# Patient Record
Sex: FEMALE | Race: WHITE | NOT HISPANIC OR LATINO | Employment: FULL TIME | URBAN - METROPOLITAN AREA
[De-identification: names, ages, dates, MRNs, and addresses within clinical notes are randomized per-mention and may not be internally consistent; named-entity substitution may affect disease eponyms.]

---

## 2023-02-07 PROBLEM — R00.9 ELEVATED HEART RATE WITH ELEVATED BLOOD PRESSURE WITHOUT DIAGNOSIS OF HYPERTENSION: Status: RESOLVED | Noted: 2023-02-07 | Resolved: 2023-02-07

## 2023-02-07 PROBLEM — M79.18 MYOFASCIAL PAIN: Status: ACTIVE | Noted: 2023-02-07

## 2023-02-07 PROBLEM — J01.00 ACUTE MAXILLARY SINUSITIS: Status: RESOLVED | Noted: 2023-02-07 | Resolved: 2023-02-07

## 2023-02-07 PROBLEM — G47.8 NON-RESTORATIVE SLEEP: Status: RESOLVED | Noted: 2023-02-07 | Resolved: 2023-02-07

## 2023-02-07 PROBLEM — R53.83 FATIGUE: Status: RESOLVED | Noted: 2023-02-07 | Resolved: 2023-02-07

## 2023-02-07 PROBLEM — N80.9 ENDOMETRIOSIS: Status: ACTIVE | Noted: 2023-02-07

## 2023-02-07 PROBLEM — R10.2 FEMALE PELVIC PAIN: Status: RESOLVED | Noted: 2023-02-07 | Resolved: 2023-02-07

## 2023-02-07 PROBLEM — E55.9 VITAMIN D DEFICIENCY: Status: ACTIVE | Noted: 2023-02-07

## 2023-02-07 PROBLEM — N93.9 ABNORMAL UTERINE BLEEDING (AUB): Status: RESOLVED | Noted: 2023-02-07 | Resolved: 2023-02-07

## 2023-02-07 PROBLEM — N97.9 INFERTILITY, FEMALE, PRIMARY: Status: ACTIVE | Noted: 2023-02-07

## 2023-02-07 PROBLEM — N94.6 DYSMENORRHEA: Status: ACTIVE | Noted: 2023-02-07

## 2023-02-07 PROBLEM — N92.0 MENORRHAGIA WITH REGULAR CYCLE: Status: RESOLVED | Noted: 2023-02-07 | Resolved: 2023-02-07

## 2023-02-07 PROBLEM — F32.0 DEPRESSION, MAJOR, SINGLE EPISODE, MILD (CMS-HCC): Status: ACTIVE | Noted: 2023-02-07

## 2023-02-07 PROBLEM — R10.9 FLANK PAIN: Status: RESOLVED | Noted: 2023-02-07 | Resolved: 2023-02-07

## 2023-02-07 PROBLEM — F41.9 ANXIETY: Status: ACTIVE | Noted: 2023-02-07

## 2023-02-07 PROBLEM — K21.9 GERD (GASTROESOPHAGEAL REFLUX DISEASE): Status: ACTIVE | Noted: 2023-02-07

## 2023-02-07 PROBLEM — R11.0 NAUSEA IN ADULT: Status: RESOLVED | Noted: 2023-02-07 | Resolved: 2023-02-07

## 2023-02-07 PROBLEM — J30.2 SEASONAL ALLERGIES: Status: ACTIVE | Noted: 2023-02-07

## 2023-02-07 PROBLEM — N94.10 DYSPAREUNIA IN FEMALE: Status: ACTIVE | Noted: 2023-02-07

## 2023-02-07 PROBLEM — R03.0 ELEVATED HEART RATE WITH ELEVATED BLOOD PRESSURE WITHOUT DIAGNOSIS OF HYPERTENSION: Status: RESOLVED | Noted: 2023-02-07 | Resolved: 2023-02-07

## 2023-02-07 PROBLEM — E61.1 LOW IRON: Status: ACTIVE | Noted: 2023-02-07

## 2023-02-07 PROBLEM — R00.2 PALPITATIONS: Status: RESOLVED | Noted: 2023-02-07 | Resolved: 2023-02-07

## 2023-02-07 PROBLEM — E11.9 NEW ONSET TYPE 2 DIABETES MELLITUS (MULTI): Status: ACTIVE | Noted: 2023-02-07

## 2023-02-07 PROBLEM — L30.9 ECZEMA: Status: ACTIVE | Noted: 2023-02-07

## 2023-02-07 PROBLEM — R94.31 ABNORMAL EKG: Status: RESOLVED | Noted: 2023-02-07 | Resolved: 2023-02-07

## 2023-02-07 PROBLEM — R29.818 SUSPECTED SLEEP APNEA: Status: ACTIVE | Noted: 2023-02-07

## 2023-02-07 PROBLEM — R51.9 HEADACHE: Status: ACTIVE | Noted: 2023-02-07

## 2023-02-07 RX ORDER — METOPROLOL SUCCINATE 25 MG/1
1 TABLET, EXTENDED RELEASE ORAL DAILY
COMMUNITY
Start: 2020-06-11 | End: 2023-06-12

## 2023-02-07 RX ORDER — ERGOCALCIFEROL 1.25 MG/1
1 CAPSULE ORAL
COMMUNITY
Start: 2016-08-16 | End: 2023-03-23 | Stop reason: ALTCHOICE

## 2023-02-07 RX ORDER — MINERAL OIL
1 ENEMA (ML) RECTAL DAILY PRN
COMMUNITY
End: 2023-04-24 | Stop reason: SDUPTHER

## 2023-02-07 RX ORDER — TRAMADOL HYDROCHLORIDE 50 MG/1
50 TABLET ORAL 4 TIMES DAILY PRN
COMMUNITY
Start: 2020-02-13 | End: 2023-06-15 | Stop reason: ALTCHOICE

## 2023-02-07 RX ORDER — DULOXETIN HYDROCHLORIDE 60 MG/1
1 CAPSULE, DELAYED RELEASE ORAL DAILY
COMMUNITY
Start: 2021-04-30 | End: 2024-01-02 | Stop reason: SDUPTHER

## 2023-02-07 RX ORDER — TRANEXAMIC ACID 650 MG/1
TABLET ORAL
COMMUNITY
Start: 2020-11-11 | End: 2023-03-23 | Stop reason: ALTCHOICE

## 2023-02-07 RX ORDER — FERROUS SULFATE 325(65) MG
1 TABLET ORAL DAILY
COMMUNITY
Start: 2022-10-19 | End: 2023-03-23 | Stop reason: ALTCHOICE

## 2023-02-07 RX ORDER — OMEPRAZOLE 20 MG/1
1 CAPSULE, DELAYED RELEASE ORAL DAILY
COMMUNITY
Start: 2022-06-24 | End: 2023-10-13 | Stop reason: SDUPTHER

## 2023-02-07 RX ORDER — DIAZEPAM 5 MG/1
TABLET ORAL EVERY 8 HOURS PRN
COMMUNITY
Start: 2019-05-06

## 2023-02-07 RX ORDER — MONTELUKAST SODIUM 10 MG/1
1 TABLET ORAL DAILY
COMMUNITY
Start: 2020-11-11 | End: 2023-06-12

## 2023-02-07 RX ORDER — METFORMIN HYDROCHLORIDE 500 MG/1
TABLET ORAL 2 TIMES DAILY
COMMUNITY
Start: 2022-03-25 | End: 2023-03-23 | Stop reason: SDUPTHER

## 2023-02-07 RX ORDER — KETOROLAC TROMETHAMINE 10 MG/1
1 TABLET, FILM COATED ORAL EVERY 8 HOURS PRN
COMMUNITY
Start: 2019-03-03 | End: 2023-10-06 | Stop reason: WASHOUT

## 2023-02-07 RX ORDER — IBUPROFEN 200 MG
CAPSULE ORAL
COMMUNITY
Start: 2022-03-25

## 2023-03-23 ENCOUNTER — TELEMEDICINE (OUTPATIENT)
Dept: PHARMACY | Facility: HOSPITAL | Age: 43
End: 2023-03-23
Payer: COMMERCIAL

## 2023-03-23 DIAGNOSIS — E11.9 TYPE 2 DIABETES MELLITUS WITHOUT COMPLICATION, WITHOUT LONG-TERM CURRENT USE OF INSULIN (MULTI): Primary | ICD-10-CM

## 2023-03-23 RX ORDER — BLOOD-GLUCOSE METER
EACH MISCELLANEOUS
COMMUNITY
Start: 2022-03-25

## 2023-03-23 RX ORDER — METFORMIN HYDROCHLORIDE 500 MG/1
1000 TABLET ORAL
COMMUNITY
End: 2023-11-29 | Stop reason: SDUPTHER

## 2023-03-23 RX ORDER — OMEPRAZOLE 20 MG/1
20 TABLET, DELAYED RELEASE ORAL
COMMUNITY
End: 2023-03-23 | Stop reason: SDUPTHER

## 2023-03-23 NOTE — PROGRESS NOTES
Caprice Cowart is a 43 y.o. female was referred to Clinical Pharmacy Team to complete a comprehensive medication review (CMR) with a pharmacist as part of the [employer] diabetes program.  The patient was referred for their Diabetes.    Referring Provider: Jacquelyn De Paz MD    Subjective   Allergies   Allergen Reactions    Penicillins Hives    Promethazine Other     Vomiting    Sumatriptan-Naproxen Other     Angina, numbness with tingling    Neomycin-Bacitracin-Polymyxin Rash       [facility] - [place], [place] - [address]  [address]  [facility] [id number]  Phone: [phone] Fax: [phone]      Diabetes  She presents for her follow-up diabetic visit. She has type 2 diabetes mellitus.       Objective     There were no vitals taken for this visit.     LAB  Lab Results   Component Value Date    BILITOT 0.3 02/18/2022    CALCIUM 8.6 08/06/2022    CO2 25 08/06/2022     08/06/2022    CREATININE 0.67 08/06/2022    GLUCOSE 136 (H) 08/06/2022    ALKPHOS 116 (H) 02/18/2022    K 4.0 08/06/2022    PROT 7.4 02/18/2022     08/06/2022    AST 16 02/18/2022    ALT 19 02/18/2022    BUN 10 08/06/2022    ANIONGAP 14 08/06/2022    ALBUMIN 3.9 02/18/2022    GFRF >90 08/06/2022     Lab Results   Component Value Date    TRIG 161 (H) 02/18/2022    CHOL 174 02/18/2022    HDL 79.0 02/18/2022     Lab Results   Component Value Date    HGBA1C 8.3 (A) 02/18/2022       Current Outpatient Medications on File Prior to Visit   Medication Sig Dispense Refill    Accu-Chek Guide Me Glucose Mtr misc Use to test blood sugars as directed      blood sugar diagnostic (Blood Glucose Test) strip TEST twice a day      diazePAM (Valium) 5 mg tablet Take by mouth every 8 hours if needed.      DULoxetine (Cymbalta) 60 mg DR capsule Take 1 capsule (60 mg) by mouth in the morning.      fexofenadine (Allegra) 180 mg tablet Take 1 tablet (180 mg) by mouth if needed each day.      ketorolac  (Toradol) 10 mg tablet Take 1 tablet (10 mg) by mouth every 8 (eight) hours if needed.      metFORMIN (Glucophage) 500 mg tablet Take 2 tablets (1,000 mg) by mouth in the morning and 2 tablets (1,000 mg) in the evening. Take with meals.      metoprolol succinate XL (Toprol-XL) 25 mg 24 hr tablet Take 1 tablet (25 mg) by mouth in the morning.      montelukast (Singulair) 10 mg tablet Take 1 tablet (10 mg) by mouth in the morning.      omeprazole (PriLOSEC) 20 mg DR capsule Take 1 capsule (20 mg) by mouth in the morning.      traMADol (Ultram) 50 mg tablet Take 1 tablet (50 mg) by mouth 4 times a day as needed.      [DISCONTINUED] ergocalciferol (Vitamin D-2) 1.25 MG (22395 UT) capsule Take 1 capsule (1,250 mcg) by mouth 1 (one) time per week.      [DISCONTINUED] ferrous sulfate 325 (65 Fe) MG tablet Take 1 tablet (325 mg) by mouth in the morning.      [DISCONTINUED] metFORMIN (Glucophage) 500 mg tablet Take by mouth twice a day.      [DISCONTINUED] omeprazole OTC (PriLOSEC OTC) 20 mg EC tablet Take 1 tablet (20 mg) by mouth once daily in the morning. Take before meals.      [DISCONTINUED] tranexamic acid (Lysteda) 650 mg tablet tablet Take by mouth.       No current facility-administered medications on file prior to visit.        HISTORICAL PHARMACOTHERAPY  -No historical pharmacotherapy    DRUG INTERACTIONS  - No major drug interactions that would require change in therapy.    SECONDARY PREVENTION  - Statin? no  - ACE-I/ARB? no    Assessment/Plan   - Patient sometimes experiences diarrhea with Metformin but states it is not impeding on daily life and wishes to stay on the medication.  - Patient tests randomly, usually not daily, but states her levels are typically ~130. States no symptomatic episodes of hyperglycemia. States she does have some symptoms similar to hypoglycemia, but believes these are due to another issue she is having.  - Patient would like increased energy, but has a lot of issues going on that are  impeding on this goal.  - Patient information updated and sent to [facility] for annual renewal of VBID enrollment.         Follow up: Annually for vbid renewal.    Yin Arrington PharmD     Verbal consent to manage patient's drug therapy was obtained from [the patient and/or an individual authorized to act on behalf of a patient]. They were informed they may decline to participate or withdraw from participation in pharmacy services at any time.

## 2023-03-30 ENCOUNTER — TELEPHONE (OUTPATIENT)
Dept: PRIMARY CARE | Facility: CLINIC | Age: 43
End: 2023-03-30
Payer: COMMERCIAL

## 2023-03-30 DIAGNOSIS — R09.81 SINUS CONGESTION: ICD-10-CM

## 2023-03-30 DIAGNOSIS — R09.81 SINUS CONGESTION: Primary | ICD-10-CM

## 2023-03-30 RX ORDER — AZITHROMYCIN 250 MG/1
TABLET, FILM COATED ORAL
Qty: 6 TABLET | Refills: 0 | Status: SHIPPED | OUTPATIENT
Start: 2023-03-30 | End: 2023-04-04

## 2023-03-30 RX ORDER — AZITHROMYCIN 250 MG/1
TABLET, FILM COATED ORAL
Qty: 6 TABLET | Refills: 0 | Status: SHIPPED | OUTPATIENT
Start: 2023-03-30 | End: 2023-03-30 | Stop reason: SDUPTHER

## 2023-03-30 RX ORDER — AZITHROMYCIN 250 MG/1
TABLET, FILM COATED ORAL
Qty: 6 TABLET | Refills: 0 | Status: CANCELLED | OUTPATIENT
Start: 2023-03-30 | End: 2023-04-04

## 2023-03-30 NOTE — TELEPHONE ENCOUNTER
Caprice is having congestion, runny nose, and post nasal drip. She has surgery on Tuesday. She wants to know if a Z-Jered can be called into [facility]. Will you send or would you like her to do a virtual apt first?

## 2023-03-30 NOTE — PROGRESS NOTES
I reviewed the progress note and agree with the resident’s findings and plans as written. Case discussed with resident.    Jackson Bennett, PharmD

## 2023-04-01 LAB
ABO GROUP (TYPE) IN BLOOD: NORMAL
ANION GAP IN SER/PLAS: 14 MMOL/L (ref 10–20)
ANTIBODY SCREEN: NORMAL
CALCIUM (MG/DL) IN SER/PLAS: 8.5 MG/DL (ref 8.6–10.3)
CARBON DIOXIDE, TOTAL (MMOL/L) IN SER/PLAS: 26 MMOL/L (ref 21–32)
CHLORIDE (MMOL/L) IN SER/PLAS: 97 MMOL/L (ref 98–107)
CHORIOGONADOTROPIN (MIU/ML) IN SER/PLAS: <2 MIU/ML
CREATININE (MG/DL) IN SER/PLAS: 0.65 MG/DL (ref 0.5–1.05)
ERYTHROCYTE DISTRIBUTION WIDTH (RATIO) BY AUTOMATED COUNT: 13.3 % (ref 11.5–14.5)
ERYTHROCYTE MEAN CORPUSCULAR HEMOGLOBIN CONCENTRATION (G/DL) BY AUTOMATED: 32.3 G/DL (ref 32–36)
ERYTHROCYTE MEAN CORPUSCULAR VOLUME (FL) BY AUTOMATED COUNT: 88 FL (ref 80–100)
ERYTHROCYTES (10*6/UL) IN BLOOD BY AUTOMATED COUNT: 4.34 X10E12/L (ref 4–5.2)
GFR FEMALE: >90 ML/MIN/1.73M2
GLUCOSE (MG/DL) IN SER/PLAS: 169 MG/DL (ref 74–99)
HEMATOCRIT (%) IN BLOOD BY AUTOMATED COUNT: 38.4 % (ref 36–46)
HEMOGLOBIN (G/DL) IN BLOOD: 12.4 G/DL (ref 12–16)
LEUKOCYTES (10*3/UL) IN BLOOD BY AUTOMATED COUNT: 8.9 X10E9/L (ref 4.4–11.3)
PLATELETS (10*3/UL) IN BLOOD AUTOMATED COUNT: 331 X10E9/L (ref 150–450)
POTASSIUM (MMOL/L) IN SER/PLAS: 3.8 MMOL/L (ref 3.5–5.3)
RH FACTOR: NORMAL
SODIUM (MMOL/L) IN SER/PLAS: 133 MMOL/L (ref 136–145)
UREA NITROGEN (MG/DL) IN SER/PLAS: 12 MG/DL (ref 6–23)

## 2023-04-02 LAB
APPEARANCE, URINE: ABNORMAL
BACTERIA, URINE: ABNORMAL /HPF
BILIRUBIN, URINE: NEGATIVE
BLOOD, URINE: NEGATIVE
BUDDING YEAST, URINE: PRESENT /HPF
COLOR, URINE: YELLOW
GLUCOSE, URINE: NEGATIVE MG/DL
KETONES, URINE: NEGATIVE MG/DL
LEUKOCYTE ESTERASE, URINE: NEGATIVE
MUCUS, URINE: ABNORMAL /LPF
NITRITE, URINE: NEGATIVE
PH, URINE: 5 (ref 5–8)
PROTEIN, URINE: ABNORMAL MG/DL
RBC, URINE: ABNORMAL /HPF (ref 0–5)
SPECIFIC GRAVITY, URINE: 1.02 (ref 1–1.03)
SQUAMOUS EPITHELIAL CELLS, URINE: 38 /HPF
UROBILINOGEN, URINE: <2 MG/DL (ref 0–1.9)
WBC, URINE: 1 /HPF (ref 0–5)

## 2023-04-03 LAB — URINE CULTURE: NORMAL

## 2023-04-04 ENCOUNTER — HOSPITAL ENCOUNTER (OUTPATIENT)
Dept: DATA CONVERSION | Facility: HOSPITAL | Age: 43
End: 2023-04-04
Attending: OBSTETRICS & GYNECOLOGY | Admitting: OBSTETRICS & GYNECOLOGY
Payer: COMMERCIAL

## 2023-04-04 DIAGNOSIS — N88.8 OTHER SPECIFIED NONINFLAMMATORY DISORDERS OF CERVIX UTERI: ICD-10-CM

## 2023-04-04 DIAGNOSIS — I10 ESSENTIAL (PRIMARY) HYPERTENSION: ICD-10-CM

## 2023-04-04 DIAGNOSIS — Z88.0 ALLERGY STATUS TO PENICILLIN: ICD-10-CM

## 2023-04-04 DIAGNOSIS — N93.9 ABNORMAL UTERINE AND VAGINAL BLEEDING, UNSPECIFIED: ICD-10-CM

## 2023-04-04 DIAGNOSIS — Z79.84 LONG TERM (CURRENT) USE OF ORAL HYPOGLYCEMIC DRUGS: ICD-10-CM

## 2023-04-04 DIAGNOSIS — N80.399 ENDOMETRIOSIS OF THE PELVIC PERITONEUM, OTHER SPECIFIED SITES, UNSPECIFIED DEPTH: ICD-10-CM

## 2023-04-04 DIAGNOSIS — D25.9 LEIOMYOMA OF UTERUS, UNSPECIFIED: ICD-10-CM

## 2023-04-04 DIAGNOSIS — E11.9 TYPE 2 DIABETES MELLITUS WITHOUT COMPLICATIONS (MULTI): ICD-10-CM

## 2023-04-04 DIAGNOSIS — N80.9 ENDOMETRIOSIS, UNSPECIFIED: ICD-10-CM

## 2023-04-04 LAB
POCT GLUCOSE: 148 MG/DL (ref 74–99)
POCT GLUCOSE: 166 MG/DL (ref 74–99)

## 2023-04-07 LAB
COMPLETE PATHOLOGY REPORT: NORMAL
CONVERTED CLINICAL DIAGNOSIS-HISTORY: NORMAL
CONVERTED FINAL DIAGNOSIS: NORMAL
CONVERTED FINAL REPORT PDF LINK TO COPY AND PASTE: NORMAL
CONVERTED GROSS DESCRIPTION: NORMAL

## 2023-04-14 ENCOUNTER — APPOINTMENT (OUTPATIENT)
Dept: PRIMARY CARE | Facility: CLINIC | Age: 43
End: 2023-04-14
Payer: COMMERCIAL

## 2023-04-17 LAB
C. DIFFICILE TOXIN, PCR: NORMAL
CLOSTRIDIUM DIFFICILE NAP 1 STRAIN (PRESUMPTIVE): NORMAL
ERYTHROCYTE DISTRIBUTION WIDTH (RATIO) BY AUTOMATED COUNT: 12.7 % (ref 11.5–14.5)
ERYTHROCYTE MEAN CORPUSCULAR HEMOGLOBIN CONCENTRATION (G/DL) BY AUTOMATED: 32.5 G/DL (ref 32–36)
ERYTHROCYTE MEAN CORPUSCULAR VOLUME (FL) BY AUTOMATED COUNT: 86 FL (ref 80–100)
ERYTHROCYTES (10*6/UL) IN BLOOD BY AUTOMATED COUNT: 4.41 X10E12/L (ref 4–5.2)
HEMATOCRIT (%) IN BLOOD BY AUTOMATED COUNT: 38.1 % (ref 36–46)
HEMOGLOBIN (G/DL) IN BLOOD: 12.4 G/DL (ref 12–16)
LEUKOCYTES (10*3/UL) IN BLOOD BY AUTOMATED COUNT: 9.2 X10E9/L (ref 4.4–11.3)
PLATELETS (10*3/UL) IN BLOOD AUTOMATED COUNT: 472 X10E9/L (ref 150–450)

## 2023-04-21 ENCOUNTER — LAB (OUTPATIENT)
Dept: LAB | Facility: LAB | Age: 43
End: 2023-04-21
Payer: COMMERCIAL

## 2023-04-21 ENCOUNTER — OFFICE VISIT (OUTPATIENT)
Dept: PRIMARY CARE | Facility: CLINIC | Age: 43
End: 2023-04-21
Payer: COMMERCIAL

## 2023-04-21 VITALS
WEIGHT: 224 LBS | BODY MASS INDEX: 36 KG/M2 | HEIGHT: 66 IN | TEMPERATURE: 97.9 F | OXYGEN SATURATION: 97 % | SYSTOLIC BLOOD PRESSURE: 118 MMHG | RESPIRATION RATE: 18 BRPM | DIASTOLIC BLOOD PRESSURE: 84 MMHG | HEART RATE: 68 BPM

## 2023-04-21 DIAGNOSIS — E11.9 NEW ONSET TYPE 2 DIABETES MELLITUS (MULTI): ICD-10-CM

## 2023-04-21 DIAGNOSIS — Z13.31 DEPRESSION SCREEN: ICD-10-CM

## 2023-04-21 DIAGNOSIS — Z00.00 ENCOUNTER FOR ANNUAL PHYSICAL EXAM: ICD-10-CM

## 2023-04-21 DIAGNOSIS — Z12.31 ENCOUNTER FOR SCREENING MAMMOGRAM FOR MALIGNANT NEOPLASM OF BREAST: ICD-10-CM

## 2023-04-21 DIAGNOSIS — E66.9 OBESITY (BMI 30-39.9): ICD-10-CM

## 2023-04-21 DIAGNOSIS — Z00.00 ENCOUNTER FOR ANNUAL PHYSICAL EXAM: Primary | ICD-10-CM

## 2023-04-21 LAB
ALANINE AMINOTRANSFERASE (SGPT) (U/L) IN SER/PLAS: 18 U/L (ref 7–45)
ALBUMIN (G/DL) IN SER/PLAS: 4 G/DL (ref 3.4–5)
ALBUMIN (MG/L) IN URINE: 54.4 MG/L
ALBUMIN/CREATININE (UG/MG) IN URINE: 45.3 UG/MG CRT (ref 0–30)
ALKALINE PHOSPHATASE (U/L) IN SER/PLAS: 111 U/L (ref 33–110)
ANION GAP IN SER/PLAS: 13 MMOL/L (ref 10–20)
ASPARTATE AMINOTRANSFERASE (SGOT) (U/L) IN SER/PLAS: 13 U/L (ref 9–39)
BILIRUBIN TOTAL (MG/DL) IN SER/PLAS: 0.3 MG/DL (ref 0–1.2)
CALCIUM (MG/DL) IN SER/PLAS: 9.3 MG/DL (ref 8.6–10.3)
CARBON DIOXIDE, TOTAL (MMOL/L) IN SER/PLAS: 29 MMOL/L (ref 21–32)
CHLORIDE (MMOL/L) IN SER/PLAS: 101 MMOL/L (ref 98–107)
CHOLESTEROL (MG/DL) IN SER/PLAS: 180 MG/DL (ref 0–199)
CHOLESTEROL IN HDL (MG/DL) IN SER/PLAS: 59.3 MG/DL
CHOLESTEROL/HDL RATIO: 3
CREATININE (MG/DL) IN SER/PLAS: 0.68 MG/DL (ref 0.5–1.05)
CREATININE (MG/DL) IN URINE: 120 MG/DL (ref 20–320)
ERYTHROCYTE DISTRIBUTION WIDTH (RATIO) BY AUTOMATED COUNT: 13.3 % (ref 11.5–14.5)
ERYTHROCYTE MEAN CORPUSCULAR HEMOGLOBIN CONCENTRATION (G/DL) BY AUTOMATED: 31.6 G/DL (ref 32–36)
ERYTHROCYTE MEAN CORPUSCULAR VOLUME (FL) BY AUTOMATED COUNT: 90 FL (ref 80–100)
ERYTHROCYTES (10*6/UL) IN BLOOD BY AUTOMATED COUNT: 4.39 X10E12/L (ref 4–5.2)
ESTIMATED AVERAGE GLUCOSE FOR HBA1C: 163 MG/DL
GFR FEMALE: >90 ML/MIN/1.73M2
GLUCOSE (MG/DL) IN SER/PLAS: 123 MG/DL (ref 74–99)
HEMATOCRIT (%) IN BLOOD BY AUTOMATED COUNT: 39.6 % (ref 36–46)
HEMOGLOBIN (G/DL) IN BLOOD: 12.5 G/DL (ref 12–16)
HEMOGLOBIN A1C/HEMOGLOBIN TOTAL IN BLOOD: 7.3 %
LDL: 73 MG/DL (ref 0–99)
LEUKOCYTES (10*3/UL) IN BLOOD BY AUTOMATED COUNT: 10.3 X10E9/L (ref 4.4–11.3)
NON HDL CHOLESTEROL: 121 MG/DL
PLATELETS (10*3/UL) IN BLOOD AUTOMATED COUNT: 442 X10E9/L (ref 150–450)
POTASSIUM (MMOL/L) IN SER/PLAS: 4.5 MMOL/L (ref 3.5–5.3)
PROTEIN TOTAL: 7.2 G/DL (ref 6.4–8.2)
SODIUM (MMOL/L) IN SER/PLAS: 138 MMOL/L (ref 136–145)
THYROTROPIN (MIU/L) IN SER/PLAS BY DETECTION LIMIT <= 0.05 MIU/L: 0.99 MIU/L (ref 0.44–3.98)
TOBACCO SCREEN, URINE: NEGATIVE
TRIGLYCERIDE (MG/DL) IN SER/PLAS: 241 MG/DL (ref 0–149)
UREA NITROGEN (MG/DL) IN SER/PLAS: 10 MG/DL (ref 6–23)
VLDL: 48 MG/DL (ref 0–40)

## 2023-04-21 PROCEDURE — 84443 ASSAY THYROID STIM HORMONE: CPT

## 2023-04-21 PROCEDURE — 80061 LIPID PANEL: CPT

## 2023-04-21 PROCEDURE — 96127 BRIEF EMOTIONAL/BEHAV ASSMT: CPT | Performed by: FAMILY MEDICINE

## 2023-04-21 PROCEDURE — 80053 COMPREHEN METABOLIC PANEL: CPT

## 2023-04-21 PROCEDURE — 85027 COMPLETE CBC AUTOMATED: CPT

## 2023-04-21 PROCEDURE — 99396 PREV VISIT EST AGE 40-64: CPT | Performed by: FAMILY MEDICINE

## 2023-04-21 PROCEDURE — 82043 UR ALBUMIN QUANTITATIVE: CPT

## 2023-04-21 PROCEDURE — 36415 COLL VENOUS BLD VENIPUNCTURE: CPT

## 2023-04-21 PROCEDURE — 82652 VIT D 1 25-DIHYDROXY: CPT

## 2023-04-21 PROCEDURE — 82570 ASSAY OF URINE CREATININE: CPT

## 2023-04-21 PROCEDURE — 83036 HEMOGLOBIN GLYCOSYLATED A1C: CPT

## 2023-04-21 RX ORDER — MULTIVITAMIN
TABLET ORAL
COMMUNITY

## 2023-04-21 ASSESSMENT — ENCOUNTER SYMPTOMS
ARTHRALGIAS: 0
SHORTNESS OF BREATH: 0
RHINORRHEA: 1
VOMITING: 0
POLYDIPSIA: 0
HEMATURIA: 0
DYSPHORIC MOOD: 1
NERVOUS/ANXIOUS: 1
FATIGUE: 1
FEVER: 0
BLOOD IN STOOL: 0
SLEEP DISTURBANCE: 0
HEADACHES: 0
WHEEZING: 0
SORE THROAT: 0
COUGH: 0
DIFFICULTY URINATING: 0
DIARRHEA: 0
CONSTIPATION: 0
BRUISES/BLEEDS EASILY: 0

## 2023-04-21 ASSESSMENT — PATIENT HEALTH QUESTIONNAIRE - PHQ9
2. FEELING DOWN, DEPRESSED OR HOPELESS: NOT AT ALL
SUM OF ALL RESPONSES TO PHQ9 QUESTIONS 1 AND 2: 0
1. LITTLE INTEREST OR PLEASURE IN DOING THINGS: NOT AT ALL

## 2023-04-21 NOTE — PROGRESS NOTES
"Subjective   Patient ID: Caprice Cowart is a 43 y.o. female who presents for Annual Exam and Med Refill.    e     Patient reported health Good    Regular Dental Visits yes    Regular Eye Visits no    Hearing Loss no    Balanced Diet yes    Weight Concerns no    Exercise None      Review of Systems   Constitutional:  Positive for fatigue. Negative for fever.   HENT:  Positive for congestion, postnasal drip and rhinorrhea. Negative for hearing loss and sore throat.    Eyes:  Negative for visual disturbance.   Respiratory:  Negative for cough, shortness of breath and wheezing.    Cardiovascular:  Negative for chest pain.   Gastrointestinal:  Negative for blood in stool, constipation, diarrhea and vomiting.   Endocrine: Negative for polydipsia and polyuria.        Pt with elevated bmi,ho printed  Pt with dm, on meds, tolerated well. Bs 120-160   Genitourinary:  Positive for vaginal bleeding. Negative for difficulty urinating and hematuria.        HAD HYST DALTON TOLD VAG BLD MAY LAST UP TO 6 WK   Musculoskeletal:  Negative for arthralgias.   Skin:  Negative for rash.   Neurological:  Negative for headaches.   Hematological:  Does not bruise/bleed easily.   Psychiatric/Behavioral:  Positive for dysphoric mood. Negative for sleep disturbance and suicidal ideas. The patient is nervous/anxious.         Pt with anx/depression, on medications , has been stable       Objective   Temp 36.6 °C (97.9 °F)   Resp 18   Ht 1.676 m (5' 6\")   Wt 102 kg (224 lb)   LMP 10/16/2022   BMI 36.15 kg/m²     Physical Exam  Vitals and nursing note reviewed.   Constitutional:       Appearance: Normal appearance.   HENT:      Head: Normocephalic and atraumatic.      Right Ear: Tympanic membrane, ear canal and external ear normal.      Left Ear: Tympanic membrane, ear canal and external ear normal.      Nose: Nose normal.      Mouth/Throat:      Mouth: Mucous membranes are moist.      Pharynx: Oropharynx is clear.   Eyes:      Extraocular " Movements: Extraocular movements intact.      Conjunctiva/sclera: Conjunctivae normal.      Pupils: Pupils are equal, round, and reactive to light.   Cardiovascular:      Rate and Rhythm: Normal rate and regular rhythm.      Heart sounds: Normal heart sounds.   Pulmonary:      Effort: Pulmonary effort is normal.      Breath sounds: Normal breath sounds.   Abdominal:      General: Abdomen is flat. Bowel sounds are normal.      Palpations: Abdomen is soft. There is no mass.      Tenderness: There is no abdominal tenderness.   Musculoskeletal:         General: Normal range of motion.      Cervical back: Neck supple.   Lymphadenopathy:      Cervical: No cervical adenopathy.   Skin:     General: Skin is warm and dry.   Neurological:      General: No focal deficit present.      Mental Status: She is alert.   Psychiatric:         Mood and Affect: Mood normal.         Behavior: Behavior normal.         Assessment/Plan   Diagnoses and all orders for this visit:  Encounter for annual physical exam  -     CBC; Future  -     Comprehensive Metabolic Panel; Future  -     Lipid Panel; Future  -     TSH with reflex to Free T4 if abnormal; Future  -     Vitamin D 1,25 Dihydroxy; Future  -     BI mammo bilateral screening tomosynthesis; Future  Depression screen  Obesity (BMI 30-39.9)  New onset type 2 diabetes mellitus (CMS/HCC)  -     Hemoglobin A1C; Future  -     Albumin , Urine Random; Future  F/up for dm check 3 mo  Advise healthy diet and exercise  Good dental care  Annual eye exam

## 2023-04-24 ENCOUNTER — TELEPHONE (OUTPATIENT)
Dept: PEDIATRICS | Facility: CLINIC | Age: 43
End: 2023-04-24
Payer: COMMERCIAL

## 2023-04-24 DIAGNOSIS — J30.2 SEASONAL ALLERGIES: ICD-10-CM

## 2023-04-24 DIAGNOSIS — E78.1 HIGH TRIGLYCERIDES: Primary | ICD-10-CM

## 2023-04-24 DIAGNOSIS — E78.1 HIGH TRIGLYCERIDES: ICD-10-CM

## 2023-04-24 LAB — VITAMIN D 1,25-DIHYDROXY: 66.7 PG/ML (ref 19.9–79.3)

## 2023-04-24 RX ORDER — ATORVASTATIN CALCIUM 10 MG/1
10 TABLET, FILM COATED ORAL DAILY
Qty: 90 TABLET | Refills: 1 | Status: SHIPPED | OUTPATIENT
Start: 2023-04-24 | End: 2023-06-22 | Stop reason: SDUPTHER

## 2023-04-24 RX ORDER — MINERAL OIL
180 ENEMA (ML) RECTAL DAILY
Qty: 90 TABLET | Refills: 3 | Status: SHIPPED | OUTPATIENT
Start: 2023-04-24 | End: 2024-01-12 | Stop reason: SDUPTHER

## 2023-04-24 NOTE — TELEPHONE ENCOUNTER
----- Message from Jacquelyn De Paz MD sent at 4/24/2023  8:16 AM EDT -----  Pt hba1c 7.3, cont metformin and ada diet. Tgs high, start aztorvastatin 10 mg daily. Recheck lipids in 2 mo

## 2023-06-12 DIAGNOSIS — J30.2 SEASONAL ALLERGIES: ICD-10-CM

## 2023-06-12 DIAGNOSIS — R03.0 ELEVATED BLOOD PRESSURE READING WITHOUT DIAGNOSIS OF HYPERTENSION: ICD-10-CM

## 2023-06-12 RX ORDER — METOPROLOL SUCCINATE 25 MG/1
TABLET, EXTENDED RELEASE ORAL
Qty: 90 TABLET | Refills: 1 | Status: SHIPPED | OUTPATIENT
Start: 2023-06-12 | End: 2024-01-12 | Stop reason: SDUPTHER

## 2023-06-12 RX ORDER — MONTELUKAST SODIUM 10 MG/1
TABLET ORAL
Qty: 90 TABLET | Refills: 1 | Status: SHIPPED | OUTPATIENT
Start: 2023-06-12 | End: 2024-01-02 | Stop reason: SDUPTHER

## 2023-06-12 NOTE — TELEPHONE ENCOUNTER
Pharmacy requests prescription below    Last Office Visit: 4/21/2023     Requested Prescriptions     Pending Prescriptions Disp Refills    montelukast (Singulair) 10 mg tablet [Pharmacy Med Name: MONTELUKAST SOD 10 MG TABLET] 90 tablet 1     Sig: TAKE 1 TABLET BY MOUTH ONCE DAILY    metoprolol succinate XL (Toprol-XL) 25 mg 24 hr tablet [Pharmacy Med Name: METOPROLOL SUCC ER 25 MG TAB] 90 tablet 1     Sig: TAKE 1 TABLET BY MOUTH ONCE DAILY

## 2023-06-15 ENCOUNTER — OFFICE VISIT (OUTPATIENT)
Dept: PRIMARY CARE | Facility: CLINIC | Age: 43
End: 2023-06-15
Payer: COMMERCIAL

## 2023-06-15 VITALS
BODY MASS INDEX: 38.38 KG/M2 | SYSTOLIC BLOOD PRESSURE: 116 MMHG | WEIGHT: 237.8 LBS | TEMPERATURE: 97.7 F | DIASTOLIC BLOOD PRESSURE: 80 MMHG | RESPIRATION RATE: 16 BRPM | HEART RATE: 78 BPM

## 2023-06-15 DIAGNOSIS — R22.42 LOCALIZED SWELLING OF LEFT FOOT: Primary | ICD-10-CM

## 2023-06-15 PROCEDURE — 3051F HG A1C>EQUAL 7.0%<8.0%: CPT | Performed by: NURSE PRACTITIONER

## 2023-06-15 PROCEDURE — 3079F DIAST BP 80-89 MM HG: CPT | Performed by: NURSE PRACTITIONER

## 2023-06-15 PROCEDURE — 3074F SYST BP LT 130 MM HG: CPT | Performed by: NURSE PRACTITIONER

## 2023-06-15 PROCEDURE — 99213 OFFICE O/P EST LOW 20 MIN: CPT | Performed by: NURSE PRACTITIONER

## 2023-06-15 PROCEDURE — 3008F BODY MASS INDEX DOCD: CPT | Performed by: NURSE PRACTITIONER

## 2023-06-15 PROCEDURE — 1036F TOBACCO NON-USER: CPT | Performed by: NURSE PRACTITIONER

## 2023-06-15 ASSESSMENT — ENCOUNTER SYMPTOMS
COUGH: 0
NAUSEA: 0
SLEEP DISTURBANCE: 0
VOMITING: 0
FATIGUE: 0
DIARRHEA: 0
JOINT SWELLING: 1
ACTIVITY CHANGE: 0
ABDOMINAL DISTENTION: 0
APPETITE CHANGE: 0
TINGLING: 1
FEVER: 0
CONSTIPATION: 0
CHILLS: 0

## 2023-06-15 NOTE — PROGRESS NOTES
Subjective   Patient ID: Caprice Cowart is a 43 y.o. female who presents for Ankle Pain.    L ankle/ top of foot x2 weeks  Went away/ came back  Tingling in toes  Swelling is random  Gets worse throughout the day  Does not hurt; feels pressure  Checks blood pressure at home; WNL  Labs in April  Denies any trauma  Denies any cardiac hx      Ankle Pain   The incident occurred 3 to 5 days ago. There was no injury mechanism. The pain is present in the left ankle. The quality of the pain is described as shooting. The pain is mild. The pain has been Constant since onset. Associated symptoms include tingling. She reports no foreign bodies present. She has tried acetaminophen for the symptoms. The treatment provided mild relief.        Review of Systems   Constitutional:  Negative for activity change, appetite change, chills, fatigue and fever.   HENT:  Negative for congestion.    Respiratory:  Negative for cough.    Gastrointestinal:  Negative for abdominal distention, constipation, diarrhea, nausea and vomiting.   Musculoskeletal:  Positive for joint swelling.   Neurological:  Positive for tingling.   Psychiatric/Behavioral:  Negative for sleep disturbance.        Objective   /80   Pulse 78   Temp 36.5 °C (97.7 °F)   Resp 16   Wt 108 kg (237 lb 12.8 oz)   LMP 03/16/2023   BMI 38.38 kg/m²     Physical Exam  Vitals reviewed.   Constitutional:       Appearance: Normal appearance.   HENT:      Head: Normocephalic.      Mouth/Throat:      Mouth: Mucous membranes are moist.   Eyes:      Extraocular Movements: Extraocular movements intact.      Pupils: Pupils are equal, round, and reactive to light.   Cardiovascular:      Rate and Rhythm: Normal rate and regular rhythm.      Pulses: Normal pulses.      Heart sounds: Normal heart sounds.   Pulmonary:      Effort: Pulmonary effort is normal.      Breath sounds: Normal breath sounds.   Musculoskeletal:      Cervical back: Normal range of motion and neck supple.    Skin:     General: Skin is warm and dry.      Capillary Refill: Capillary refill takes less than 2 seconds.   Neurological:      General: No focal deficit present.      Mental Status: She is alert and oriented to person, place, and time.   Psychiatric:         Mood and Affect: Mood normal.         Behavior: Behavior normal.         Assessment/Plan   Problem List Items Addressed This Visit       Localized swelling of left foot - Primary     Blood work ordered; will follow up results as needed  No order for xray as pt states no pain or trauma  Encouraged daily wear of compression hose  Reduce salt in diet  Follow up with PCP if not improving  ER for any SOB, difficulty breathing, uncontrolled fevers, or worsening of symptoms         Relevant Orders    CBC (Completed)    Comprehensive Metabolic Panel (Completed)    Uric acid (Completed)

## 2023-06-17 ENCOUNTER — LAB (OUTPATIENT)
Dept: LAB | Facility: LAB | Age: 43
End: 2023-06-17
Payer: COMMERCIAL

## 2023-06-17 DIAGNOSIS — E78.1 HIGH TRIGLYCERIDES: ICD-10-CM

## 2023-06-17 DIAGNOSIS — R22.42 LOCALIZED SWELLING OF LEFT FOOT: ICD-10-CM

## 2023-06-17 LAB
ALANINE AMINOTRANSFERASE (SGPT) (U/L) IN SER/PLAS: 17 U/L (ref 7–45)
ALBUMIN (G/DL) IN SER/PLAS: 4.1 G/DL (ref 3.4–5)
ALKALINE PHOSPHATASE (U/L) IN SER/PLAS: 127 U/L (ref 33–110)
ANION GAP IN SER/PLAS: 11 MMOL/L (ref 10–20)
ASPARTATE AMINOTRANSFERASE (SGOT) (U/L) IN SER/PLAS: 12 U/L (ref 9–39)
BILIRUBIN TOTAL (MG/DL) IN SER/PLAS: 0.3 MG/DL (ref 0–1.2)
CALCIUM (MG/DL) IN SER/PLAS: 9.3 MG/DL (ref 8.6–10.3)
CARBON DIOXIDE, TOTAL (MMOL/L) IN SER/PLAS: 30 MMOL/L (ref 21–32)
CHLORIDE (MMOL/L) IN SER/PLAS: 101 MMOL/L (ref 98–107)
CHOLESTEROL (MG/DL) IN SER/PLAS: 154 MG/DL (ref 0–199)
CHOLESTEROL IN HDL (MG/DL) IN SER/PLAS: 58.7 MG/DL
CHOLESTEROL/HDL RATIO: 2.6
CREATININE (MG/DL) IN SER/PLAS: 0.69 MG/DL (ref 0.5–1.05)
ERYTHROCYTE DISTRIBUTION WIDTH (RATIO) BY AUTOMATED COUNT: 13.2 % (ref 11.5–14.5)
ERYTHROCYTE MEAN CORPUSCULAR HEMOGLOBIN CONCENTRATION (G/DL) BY AUTOMATED: 31.5 G/DL (ref 32–36)
ERYTHROCYTE MEAN CORPUSCULAR VOLUME (FL) BY AUTOMATED COUNT: 88 FL (ref 80–100)
ERYTHROCYTES (10*6/UL) IN BLOOD BY AUTOMATED COUNT: 4.58 X10E12/L (ref 4–5.2)
GFR FEMALE: >90 ML/MIN/1.73M2
GLUCOSE (MG/DL) IN SER/PLAS: 149 MG/DL (ref 74–99)
HEMATOCRIT (%) IN BLOOD BY AUTOMATED COUNT: 40.3 % (ref 36–46)
HEMOGLOBIN (G/DL) IN BLOOD: 12.7 G/DL (ref 12–16)
LDL: 52 MG/DL (ref 0–99)
LEUKOCYTES (10*3/UL) IN BLOOD BY AUTOMATED COUNT: 10.4 X10E9/L (ref 4.4–11.3)
NON HDL CHOLESTEROL: 95 MG/DL
PLATELETS (10*3/UL) IN BLOOD AUTOMATED COUNT: 364 X10E9/L (ref 150–450)
POTASSIUM (MMOL/L) IN SER/PLAS: 4.4 MMOL/L (ref 3.5–5.3)
PROTEIN TOTAL: 7.3 G/DL (ref 6.4–8.2)
SODIUM (MMOL/L) IN SER/PLAS: 138 MMOL/L (ref 136–145)
TRIGLYCERIDE (MG/DL) IN SER/PLAS: 215 MG/DL (ref 0–149)
URATE (MG/DL) IN SER/PLAS: 5.5 MG/DL (ref 2.3–6.7)
UREA NITROGEN (MG/DL) IN SER/PLAS: 13 MG/DL (ref 6–23)
VLDL: 43 MG/DL (ref 0–40)

## 2023-06-17 PROCEDURE — 84550 ASSAY OF BLOOD/URIC ACID: CPT

## 2023-06-17 PROCEDURE — 36415 COLL VENOUS BLD VENIPUNCTURE: CPT

## 2023-06-17 PROCEDURE — 80053 COMPREHEN METABOLIC PANEL: CPT

## 2023-06-17 PROCEDURE — 80061 LIPID PANEL: CPT

## 2023-06-17 PROCEDURE — 85027 COMPLETE CBC AUTOMATED: CPT

## 2023-06-19 PROBLEM — R22.42 LOCALIZED SWELLING OF LEFT FOOT: Status: ACTIVE | Noted: 2023-06-19

## 2023-06-19 NOTE — ASSESSMENT & PLAN NOTE
Blood work ordered; will follow up results as needed  No order for xray as pt states no pain or trauma  Encouraged daily wear of compression hose  Reduce salt in diet  Follow up with PCP if not improving  ER for any SOB, difficulty breathing, uncontrolled fevers, or worsening of symptoms

## 2023-06-22 DIAGNOSIS — E78.1 HIGH TRIGLYCERIDES: ICD-10-CM

## 2023-06-22 RX ORDER — ATORVASTATIN CALCIUM 20 MG/1
20 TABLET, FILM COATED ORAL DAILY
Qty: 90 TABLET | Refills: 1 | Status: SHIPPED | OUTPATIENT
Start: 2023-06-22 | End: 2023-06-22

## 2023-07-13 ASSESSMENT — ENCOUNTER SYMPTOMS
FATIGUE: 1
WEIGHT LOSS: 0
CONFUSION: 0
DIZZINESS: 0
SWEATS: 1
SPEECH DIFFICULTY: 0
HEADACHES: 1
SEIZURES: 0
TREMORS: 0
BLURRED VISION: 1
POLYDIPSIA: 0
HUNGER: 0
BLACKOUTS: 0
VISUAL CHANGE: 1
WEAKNESS: 0
NERVOUS/ANXIOUS: 1
POLYPHAGIA: 0

## 2023-07-14 ENCOUNTER — LAB (OUTPATIENT)
Dept: LAB | Facility: LAB | Age: 43
End: 2023-07-14
Payer: COMMERCIAL

## 2023-07-14 ENCOUNTER — OFFICE VISIT (OUTPATIENT)
Dept: PRIMARY CARE | Facility: CLINIC | Age: 43
End: 2023-07-14
Payer: COMMERCIAL

## 2023-07-14 VITALS
BODY MASS INDEX: 36.96 KG/M2 | TEMPERATURE: 97.3 F | SYSTOLIC BLOOD PRESSURE: 122 MMHG | HEIGHT: 66 IN | DIASTOLIC BLOOD PRESSURE: 66 MMHG | HEART RATE: 88 BPM | RESPIRATION RATE: 20 BRPM | WEIGHT: 230 LBS

## 2023-07-14 DIAGNOSIS — E11.9 NEW ONSET TYPE 2 DIABETES MELLITUS (MULTI): ICD-10-CM

## 2023-07-14 DIAGNOSIS — E11.9 NEW ONSET TYPE 2 DIABETES MELLITUS (MULTI): Primary | ICD-10-CM

## 2023-07-14 DIAGNOSIS — G43.009 MIGRAINE WITHOUT AURA AND WITHOUT STATUS MIGRAINOSUS, NOT INTRACTABLE: ICD-10-CM

## 2023-07-14 DIAGNOSIS — R22.42 LOCALIZED SWELLING OF LEFT FOOT: ICD-10-CM

## 2023-07-14 LAB
ALANINE AMINOTRANSFERASE (SGPT) (U/L) IN SER/PLAS: 21 U/L (ref 7–45)
ALBUMIN (G/DL) IN SER/PLAS: 4.1 G/DL (ref 3.4–5)
ALKALINE PHOSPHATASE (U/L) IN SER/PLAS: 131 U/L (ref 33–110)
ANION GAP IN SER/PLAS: 15 MMOL/L (ref 10–20)
ASPARTATE AMINOTRANSFERASE (SGOT) (U/L) IN SER/PLAS: 14 U/L (ref 9–39)
BILIRUBIN TOTAL (MG/DL) IN SER/PLAS: 0.3 MG/DL (ref 0–1.2)
CALCIUM (MG/DL) IN SER/PLAS: 9.2 MG/DL (ref 8.6–10.3)
CARBON DIOXIDE, TOTAL (MMOL/L) IN SER/PLAS: 28 MMOL/L (ref 21–32)
CHLORIDE (MMOL/L) IN SER/PLAS: 101 MMOL/L (ref 98–107)
CREATININE (MG/DL) IN SER/PLAS: 0.71 MG/DL (ref 0.5–1.05)
ESTIMATED AVERAGE GLUCOSE FOR HBA1C: 169 MG/DL
GFR FEMALE: >90 ML/MIN/1.73M2
GLUCOSE (MG/DL) IN SER/PLAS: 149 MG/DL (ref 74–99)
HEMOGLOBIN A1C/HEMOGLOBIN TOTAL IN BLOOD: 7.5 %
POTASSIUM (MMOL/L) IN SER/PLAS: 4.5 MMOL/L (ref 3.5–5.3)
PROTEIN TOTAL: 7.2 G/DL (ref 6.4–8.2)
SODIUM (MMOL/L) IN SER/PLAS: 139 MMOL/L (ref 136–145)
UREA NITROGEN (MG/DL) IN SER/PLAS: 15 MG/DL (ref 6–23)

## 2023-07-14 PROCEDURE — 80053 COMPREHEN METABOLIC PANEL: CPT

## 2023-07-14 PROCEDURE — 3078F DIAST BP <80 MM HG: CPT | Performed by: FAMILY MEDICINE

## 2023-07-14 PROCEDURE — 3074F SYST BP LT 130 MM HG: CPT | Performed by: FAMILY MEDICINE

## 2023-07-14 PROCEDURE — 3008F BODY MASS INDEX DOCD: CPT | Performed by: FAMILY MEDICINE

## 2023-07-14 PROCEDURE — 99214 OFFICE O/P EST MOD 30 MIN: CPT | Performed by: FAMILY MEDICINE

## 2023-07-14 PROCEDURE — 3051F HG A1C>EQUAL 7.0%<8.0%: CPT | Performed by: FAMILY MEDICINE

## 2023-07-14 PROCEDURE — 83036 HEMOGLOBIN GLYCOSYLATED A1C: CPT

## 2023-07-14 PROCEDURE — 36415 COLL VENOUS BLD VENIPUNCTURE: CPT

## 2023-07-14 PROCEDURE — 96372 THER/PROPH/DIAG INJ SC/IM: CPT | Performed by: FAMILY MEDICINE

## 2023-07-14 PROCEDURE — 1036F TOBACCO NON-USER: CPT | Performed by: FAMILY MEDICINE

## 2023-07-14 RX ORDER — KETOROLAC TROMETHAMINE 30 MG/ML
60 INJECTION, SOLUTION INTRAMUSCULAR; INTRAVENOUS ONCE
Status: DISCONTINUED | OUTPATIENT
Start: 2023-07-14 | End: 2023-07-14

## 2023-07-14 RX ORDER — KETOROLAC TROMETHAMINE 30 MG/ML
60 INJECTION, SOLUTION INTRAMUSCULAR; INTRAVENOUS ONCE
Status: COMPLETED | OUTPATIENT
Start: 2023-07-14 | End: 2023-07-14

## 2023-07-14 RX ORDER — KETOROLAC TROMETHAMINE 30 MG/ML
30 INJECTION, SOLUTION INTRAMUSCULAR; INTRAVENOUS ONCE
Status: DISCONTINUED | OUTPATIENT
Start: 2023-07-14 | End: 2023-07-14

## 2023-07-14 RX ADMIN — KETOROLAC TROMETHAMINE 60 MG: 30 INJECTION, SOLUTION INTRAMUSCULAR; INTRAVENOUS at 10:03

## 2023-07-14 RX ADMIN — KETOROLAC TROMETHAMINE 60 MG: 30 INJECTION, SOLUTION INTRAMUSCULAR; INTRAVENOUS at 13:02

## 2023-07-14 ASSESSMENT — ENCOUNTER SYMPTOMS
DIARRHEA: 0
CONFUSION: 0
WHEEZING: 0
JOINT SWELLING: 1
DYSURIA: 0
VOMITING: 0
POLYDIPSIA: 0
SPEECH DIFFICULTY: 0
SEIZURES: 0
WEAKNESS: 0
COUGH: 0
TREMORS: 0
FATIGUE: 1
HEMATURIA: 0
DIZZINESS: 0
NERVOUS/ANXIOUS: 1
FEVER: 0
HEADACHES: 1
NAUSEA: 1
POLYPHAGIA: 0
SORE THROAT: 1

## 2023-07-14 NOTE — PROGRESS NOTES
"Subjective   Patient ID: Caprice Cowart is a 43 y.o. female who presents for Follow-up (3m DM check (A1C on back order, will need BW). Pt is also on day 3 of a migraine, would like a toradol shot. ).         Review of Systems   Constitutional:  Positive for fatigue. Negative for fever.   HENT:  Positive for congestion, postnasal drip and sore throat. Negative for ear pain.         Pt sinus symptoms have flared since [place] wild[place] air pollution   Respiratory:  Negative for cough and wheezing.    Cardiovascular:  Negative for chest pain.   Gastrointestinal:  Positive for nausea. Negative for diarrhea and vomiting.   Endocrine: Negative for polydipsia, polyphagia and polyuria.        Has new onset DM, on metformin, [place] [facility] well. Bs at home 120-140   Genitourinary:  Negative for dysuria and hematuria.   Musculoskeletal:  Positive for joint swelling.        Left lat ankle with swelling and tingling into foot. No pain. Would like to see podiatry.  Was seen previously with another practioner at [facility].   Skin:  Negative for pallor.   Neurological:  Positive for headaches. Negative for dizziness, tremors, seizures, speech difficulty and weakness.        Hx of migraine h/a , allergy to treximet. Pt would like toradol inj today  H/a located on side of head to the back, feels like a band. Has nausea. Mild noise sens, no light sens.    Psychiatric/Behavioral:  Negative for confusion. The patient is nervous/anxious.        Objective   /66   Pulse 88   Temp 36.3 °C (97.3 °F)   Resp 20   Ht 1.676 m (5' 6\")   Wt 104 kg (230 lb)   LMP 03/16/2023   BMI 37.12 kg/m²     Physical Exam  Vitals and nursing note reviewed.   Constitutional:       General: She is not in acute distress.     Appearance: Normal appearance.   HENT:      Head: Normocephalic and atraumatic.      Right Ear: Tympanic membrane, ear canal and external ear normal.      Left Ear: Tympanic membrane, ear canal and external ear normal.      Nose: " Nose normal.      Mouth/Throat:      Mouth: Mucous membranes are moist.      Pharynx: Oropharynx is clear.   Eyes:      Extraocular Movements: Extraocular movements intact.      Conjunctiva/sclera: Conjunctivae normal.      Pupils: Pupils are equal, round, and reactive to light.   Cardiovascular:      Rate and Rhythm: Normal rate and regular rhythm.      Heart sounds: Normal heart sounds.   Pulmonary:      Effort: Pulmonary effort is normal.      Breath sounds: Normal breath sounds.   Musculoskeletal:         General: Swelling present. No tenderness or deformity.      Comments: Left lat ankle with mild swelling lat maleolus,NT, neurovasc intact, FROM   Neurological:      General: No focal deficit present.      Mental Status: She is alert.      Cranial Nerves: No cranial nerve deficit.      Sensory: No sensory deficit.      Motor: No weakness.         Assessment/Plan   Problem List Items Addressed This Visit       New onset type 2 diabetes mellitus (CMS/HCC) - Primary     Pt on metformin, med well tolerated , bs have been stable  F/up 3 mo         Relevant Orders    Comprehensive Metabolic Panel    Hemoglobin A1C    Localized swelling of left foot     Pt seen previously by another practitioner, would like to see  pod/confalone         Relevant Orders    Referral to Podiatry    Migraine without aura and without status migrainosus, not intractable     Currently taking tylenol and ibuprofen, requesting toradol.         Relevant Medications    ketorolac (Toradol) injection 60 mg (Start on 7/14/2023  1:00 PM)

## 2023-07-18 ENCOUNTER — TELEPHONE (OUTPATIENT)
Dept: PRIMARY CARE | Facility: CLINIC | Age: 43
End: 2023-07-18
Payer: COMMERCIAL

## 2023-07-18 NOTE — TELEPHONE ENCOUNTER
She said its her diet, she will improve it. Does not want to add med at this time. Will see how its trending next DM check in 3m

## 2023-07-18 NOTE — TELEPHONE ENCOUNTER
----- Message from Jacquelyn De Paz MD sent at 7/17/2023  8:01 AM EDT -----  Call nikita ,hba1c 7.5 is she ok with starting jardiance 10 mg daily.that should get her to goal hba1c < 7

## 2023-07-25 DIAGNOSIS — G43.009 MIGRAINE WITHOUT AURA AND WITHOUT STATUS MIGRAINOSUS, NOT INTRACTABLE: ICD-10-CM

## 2023-07-25 RX ORDER — UBROGEPANT 50 MG/1
1 TABLET ORAL DAILY
Qty: 12 TABLET | Refills: 0 | Status: SHIPPED | OUTPATIENT
Start: 2023-07-25 | End: 2023-09-05

## 2023-08-08 LAB — SARS-COV-2 RESULT: NOT DETECTED

## 2023-08-09 ENCOUNTER — OFFICE VISIT (OUTPATIENT)
Dept: PRIMARY CARE | Facility: CLINIC | Age: 43
End: 2023-08-09
Payer: COMMERCIAL

## 2023-08-09 ENCOUNTER — APPOINTMENT (OUTPATIENT)
Dept: PRIMARY CARE | Facility: CLINIC | Age: 43
End: 2023-08-09
Payer: COMMERCIAL

## 2023-08-09 VITALS
HEART RATE: 120 BPM | SYSTOLIC BLOOD PRESSURE: 132 MMHG | WEIGHT: 229 LBS | DIASTOLIC BLOOD PRESSURE: 80 MMHG | HEIGHT: 66 IN | RESPIRATION RATE: 20 BRPM | TEMPERATURE: 99.7 F | BODY MASS INDEX: 36.8 KG/M2

## 2023-08-09 DIAGNOSIS — J02.9 PHARYNGITIS, UNSPECIFIED ETIOLOGY: ICD-10-CM

## 2023-08-09 DIAGNOSIS — R50.9 FEVER, UNSPECIFIED FEVER CAUSE: ICD-10-CM

## 2023-08-09 LAB
POC RAPID INFLUENZA A: NEGATIVE
POC RAPID INFLUENZA B: NEGATIVE
POC RAPID STREP: NEGATIVE

## 2023-08-09 PROCEDURE — 87880 STREP A ASSAY W/OPTIC: CPT | Performed by: FAMILY MEDICINE

## 2023-08-09 PROCEDURE — 3008F BODY MASS INDEX DOCD: CPT | Performed by: FAMILY MEDICINE

## 2023-08-09 PROCEDURE — 87651 STREP A DNA AMP PROBE: CPT

## 2023-08-09 PROCEDURE — 3051F HG A1C>EQUAL 7.0%<8.0%: CPT | Performed by: FAMILY MEDICINE

## 2023-08-09 PROCEDURE — 3075F SYST BP GE 130 - 139MM HG: CPT | Performed by: FAMILY MEDICINE

## 2023-08-09 PROCEDURE — 3079F DIAST BP 80-89 MM HG: CPT | Performed by: FAMILY MEDICINE

## 2023-08-09 PROCEDURE — 99214 OFFICE O/P EST MOD 30 MIN: CPT | Performed by: FAMILY MEDICINE

## 2023-08-09 PROCEDURE — 87804 INFLUENZA ASSAY W/OPTIC: CPT | Performed by: FAMILY MEDICINE

## 2023-08-09 PROCEDURE — 1036F TOBACCO NON-USER: CPT | Performed by: FAMILY MEDICINE

## 2023-08-09 RX ORDER — AZITHROMYCIN 250 MG/1
TABLET, FILM COATED ORAL
Qty: 6 TABLET | Refills: 0 | Status: SHIPPED | OUTPATIENT
Start: 2023-08-09 | End: 2023-08-14

## 2023-08-09 RX ORDER — METHYLPREDNISOLONE 4 MG/1
TABLET ORAL
Qty: 21 TABLET | Refills: 0 | Status: SHIPPED | OUTPATIENT
Start: 2023-08-09 | End: 2023-08-16

## 2023-08-09 ASSESSMENT — ENCOUNTER SYMPTOMS
FEVER: 1
BLOOD IN STOOL: 0
ABDOMINAL PAIN: 1
MYALGIAS: 1
SHORTNESS OF BREATH: 0
WHEEZING: 0
DYSURIA: 0
NAUSEA: 0
HEMATURIA: 0
COUGH: 1
HEADACHES: 1
DIARRHEA: 1
VOMITING: 0
SORE THROAT: 1

## 2023-08-09 NOTE — ASSESSMENT & PLAN NOTE
Pt had [facility] covid test yest and was negative, test may have been too early  Pt to be free of fever and diarrhea and feel well before RTW

## 2023-08-09 NOTE — PROGRESS NOTES
"Subjective   Patient ID: Caprice Cowart is a 43 y.o. female who presents for Sore Throat (Pt tested neg for COVID through employee health.).    Fever   This is a new problem. The current episode started yesterday. The problem occurs constantly. The problem has been rapidly worsening. The maximum temperature noted was 103 to 103.9 F. The temperature was taken using a tympanic thermometer. Associated symptoms include abdominal pain, chest pain, congestion, coughing, diarrhea, ear pain, headaches, muscle aches, sleepiness and a sore throat. Pertinent negatives include no nausea, rash, urinary pain, vomiting or wheezing.        Review of Systems   Constitutional:  Positive for fever.        Just got back from [email or web address] ,symptoms started yesterday.. no family ill.   covid test done yesterday was negative.  Taking tylenol and motrin for fever,.     HENT:  Positive for congestion, ear pain, postnasal drip and sore throat.    Respiratory:  Positive for cough. Negative for shortness of breath and wheezing.    Cardiovascular:  Positive for chest pain.   Gastrointestinal:  Positive for abdominal pain and diarrhea. Negative for blood in stool, nausea and vomiting.   Genitourinary:  Negative for dysuria and hematuria.   Musculoskeletal:  Positive for myalgias.   Skin:  Negative for rash.   Neurological:  Positive for headaches.       Objective   /80   Pulse (!) 120   Temp 37.6 °C (99.7 °F)   Resp 20   Ht 1.676 m (5' 6\")   Wt 104 kg (229 lb)   LMP 03/16/2023   BMI 36.96 kg/m²     Physical Exam  Vitals and nursing note reviewed.   Constitutional:       General: She is not in acute distress.     Appearance: She is ill-appearing.   HENT:      Head: Normocephalic and atraumatic.      Right Ear: Tympanic membrane, ear canal and external ear normal.      Left Ear: Tympanic membrane, ear canal and external ear normal.      Nose: Congestion present.      Mouth/Throat:      Mouth: Mucous membranes are moist.      Pharynx: " Posterior oropharyngeal erythema present.   Cardiovascular:      Rate and Rhythm: Normal rate and regular rhythm.      Heart sounds: Normal heart sounds.   Pulmonary:      Effort: Pulmonary effort is normal.      Breath sounds: Normal breath sounds.   Abdominal:      General: Abdomen is flat. Bowel sounds are normal.      Palpations: Abdomen is soft.   Musculoskeletal:      Cervical back: Neck supple.   Lymphadenopathy:      Cervical: Cervical adenopathy present.   Skin:     General: Skin is warm and dry.   Neurological:      Mental Status: She is alert.         Assessment/Plan   Problem List Items Addressed This Visit       Pharyngitis     Advised pt rst negative  Will send pcr  Pt to start atbx   May use saline gargles, throat spray/lozenges, motrin  Avoid citrus, carbonated beverages, hard crunchy foods  May have soft diet  F/up if no improvement         Relevant Medications    azithromycin (Zithromax) 250 mg tablet    methylPREDNISolone (Medrol Dospak) 4 mg tablets    Other Relevant Orders    POCT Rapid Strep A manually resulted (Completed)    Group A Streptococcus, PCR    Fever     Pt had [facility] covid test yest and was negative, test may have been too early  Pt to be free of fever and diarrhea and feel well before RTW         Relevant Orders    POCT Influenza A/B manually resulted (Completed)

## 2023-08-09 NOTE — ASSESSMENT & PLAN NOTE
Advised pt rst negative  Will send pcr  Pt to start atbx   May use saline gargles, throat spray/lozenges, motrin  Avoid citrus, carbonated beverages, hard crunchy foods  May have soft diet  F/up if no improvement

## 2023-08-10 LAB — GROUP A STREP, PCR: NOT DETECTED

## 2023-08-11 DIAGNOSIS — J02.9 SORE THROAT: Primary | ICD-10-CM

## 2023-08-12 ENCOUNTER — LAB (OUTPATIENT)
Dept: LAB | Facility: LAB | Age: 43
End: 2023-08-12
Payer: COMMERCIAL

## 2023-08-12 DIAGNOSIS — J02.9 SORE THROAT: ICD-10-CM

## 2023-08-12 PROCEDURE — 86664 EPSTEIN-BARR NUCLEAR ANTIGEN: CPT

## 2023-08-12 PROCEDURE — 86665 EPSTEIN-BARR CAPSID VCA: CPT

## 2023-08-12 PROCEDURE — 86663 EPSTEIN-BARR ANTIBODY: CPT

## 2023-08-12 PROCEDURE — 36415 COLL VENOUS BLD VENIPUNCTURE: CPT

## 2023-08-13 LAB
EBV INTERPRETATION: ABNORMAL
EPSTEIN-BARR VCA IGG: POSITIVE
EPSTEIN-BARR VCA IGM: POSITIVE
EPSTEIN-BARR VIRUS EARLY ANTIGEN ANTIBODY, IGG: POSITIVE
EPSTIEN-BARR NUCLEAR ANTIGEN AB: POSITIVE

## 2023-08-15 DIAGNOSIS — Z86.19 HISTORY OF MONONUCLEOSIS: ICD-10-CM

## 2023-09-02 DIAGNOSIS — G43.009 MIGRAINE WITHOUT AURA AND WITHOUT STATUS MIGRAINOSUS, NOT INTRACTABLE: ICD-10-CM

## 2023-09-05 RX ORDER — UBROGEPANT 50 MG/1
1 TABLET ORAL DAILY
Qty: 10 TABLET | Refills: 0 | Status: SHIPPED | OUTPATIENT
Start: 2023-09-05 | End: 2023-09-26 | Stop reason: SDUPTHER

## 2023-09-14 NOTE — H&P
History of Present Illness:   Pregnant/Lactating:  ·  Are You Pregnant no   ·  Are You Currently Breastfeeding no     History Present Illness:  Reason for surgery: endometrioses   HPI:    endometrioses    Allergies:        Allergies:  ·  Neosporin : Rash  ·  penicillin : Rash, Hives/Urticaria  ·  Treximet : Other       Intolerances:  ·  Phenergan : Nausea/Vomiting    Home Medication Review:   Home Medications Reviewed: yes     Impression/Procedure:   ·  Impression and Planned Procedure: laparoscopic hysterectomy       ERAS (Enhanced Recovery After Surgery):  ·  ERAS Patient: yes   ·  CPM/PAT Utilization: yes   ·  Immunonutrition Recovery Drink Utilization: yes   ·  Carbohydrate Supplement Drink Utilization: yes       Physical Exam by System:    Constitutional: Well developed, awake/alert/oriented  x3, no distress, alert and cooperative   Head/Neck: Neck supple   Respiratory/Thorax: No respiratory distress   Cardiovascular: No cardiac distress   Musculoskeletal: ROM intact, no joint swelling, normal  strength   Lymphatic: No lymphadenopathy   Skin: Warm and dry, no lesions, no rashes     Consent:   COVID-19 Consent:  ·  COVID-19 Risk Consent Surgeon has reviewed key risks related to the risk of preethi COVID-19 and if they contract COVID-19 what the risks are.       Electronic Signatures:  Vishnu Bell)  (Signed 04-Apr-2023 08:54)   Authored: History of Present Illness, Allergies, Home  Medication Review, Impression/Procedure, ERAS, Physical Exam, Consent, Note Completion      Last Updated: 04-Apr-2023 08:54 by Vishnu Bell)

## 2023-09-26 DIAGNOSIS — G43.009 MIGRAINE WITHOUT AURA AND WITHOUT STATUS MIGRAINOSUS, NOT INTRACTABLE: ICD-10-CM

## 2023-09-26 RX ORDER — UBROGEPANT 50 MG/1
1 TABLET ORAL DAILY
Qty: 10 TABLET | Refills: 0 | Status: SHIPPED | OUTPATIENT
Start: 2023-09-26 | End: 2023-10-23 | Stop reason: SDUPTHER

## 2023-09-26 NOTE — TELEPHONE ENCOUNTER
Requested Prescriptions     Pending Prescriptions Disp Refills    ubrogepant (Ubrelvy) 50 mg tablet 10 tablet 0     Sig: Take 1 tablet (50 mg) by mouth once daily.

## 2023-10-02 NOTE — OP NOTE
PROCEDURE DETAILS    Preoperative Diagnosis:  Abnormal uterine bleeding, N93.9    Postoperative Diagnosis:  Endometriosis    Surgeon: Vishnu Bell  Resident/Fellow/Other Assistant: Pj Angulo    Procedure:  1. TOTAL LAPAROSCOPIC HYSTERECTOMY, BILATERAL SALPINGECTOMY, CYSTOSCOPY   excision of peritoneal tissue suspicious for endometriosis    Anesthesia: Nikita Daley  Estimated Blood Loss: min  Findings:  scarring throughout peritoneum active endometriosis right sidewall  Specimens(s) Collected: yes,   uterus cervix tubes peritoneum         Operative Report:     Procedure:  After informed consent was obtained, the patient was brought to the operating room. Safety huddle was compleated. General anestesia was started without complication. The patient was placed in lithotomy position in yellowfin stirrups. An exam under anesthesia  was performed. She was prepped  and draped in the normal sterile fashion with ChloraPrep and clorahexadine vaginal prep. Timeout was performed. She was given preoperative antibiotics. Seo was introduced into the bladder with with production of clear  urine. A speculum was placed in the vagina and anterior lip of the cervix grasped with a single-tooth tenaculum. The cervix was injected with marcaine epinephrine and vasopression intracervically. The uterus was sounded and dilated, a uterine manipulator  was placed in the uterine cavity. Attention was then paid to the abdomen.  Marcaine was injected at the umbilicus and a scalpel incision was made midline to 15 mm down to the underlying fascia which was opened sharply, the peritoneal cavity was opened bluntly. A Single site port was placed. The peritoneum was insufflated and  1, 5 mm trochar was placed in the left lower quadrant under direct visualization.  The abdomen was surveyed and normal anatomy was then restored. The round ligament to the utero-ovarian ligament broad ligament and uterine artery were sealed and divided away  bilaterally. The bladder was backfilled to identify the edge. Bladder flap was  created across the anterior flap. The colpotomy was then performed anteriorly and then circumferentially. The uterus was removed through the vagina. Suction and irrigation were performed to allow to the edges of the vagina which was then closed laparoscopically  in a horizontal running fashion using V-lock suture. The tubes were sealed and divided away and removed from the abdomen. Hemostasis was noted and all surfaces were well visualized, all irrigation fluid was removed from the peritoneum.      area of peritoneum on right pelvic sidewall was excised specimen handed off the field    The diaphragm was irrigated with a dilute lidocaine and bicarb solution and left in place. The rectus fascia the umbilicus was then closed. Subcutaneous tissue was irrigated and closed skin was closed on the 2 ports skin glue was placed over all incisions.  The pneumo-occluder was removed from the vagina vaginoscopy done,  excellent cuff closure was noted.  Cystoscopy is then performed identifying ureteral orifices bilaterally and no intrusion or distortion upon the bladder. Bilateral reflux from the  ureter orifices was noted. Procedure was then ended sponge lap needle counts are reported as correctx2. The patient was transferred to PACU in stable condition.                        Attestation:   Note Completion:  Attending Attestation I performed the procedure without a resident         Electronic Signatures:  Vishnu Bell)  (Signed 04-Apr-2023 15:18)   Authored: Post-Operative Note, Chart Review, Note Completion      Last Updated: 04-Apr-2023 15:18 by Vishnu Bell)

## 2023-10-06 ENCOUNTER — TELEMEDICINE (OUTPATIENT)
Dept: PRIMARY CARE | Facility: CLINIC | Age: 43
End: 2023-10-06
Payer: COMMERCIAL

## 2023-10-06 DIAGNOSIS — G43.009 MIGRAINE WITHOUT AURA AND WITHOUT STATUS MIGRAINOSUS, NOT INTRACTABLE: Primary | ICD-10-CM

## 2023-10-06 PROCEDURE — 99214 OFFICE O/P EST MOD 30 MIN: CPT | Performed by: FAMILY MEDICINE

## 2023-10-06 RX ORDER — TOPIRAMATE 25 MG/1
25 TABLET ORAL DAILY
Qty: 30 TABLET | Refills: 3 | Status: SHIPPED | OUTPATIENT
Start: 2023-10-06 | End: 2023-10-23 | Stop reason: SDUPTHER

## 2023-10-06 ASSESSMENT — ENCOUNTER SYMPTOMS
VOMITING: 1
WEAKNESS: 0
DIARRHEA: 0
FEVER: 0
NAUSEA: 1
DIFFICULTY URINATING: 0
WHEEZING: 0
LIGHT-HEADEDNESS: 1
FATIGUE: 0
CONSTIPATION: 0
NUMBNESS: 0
COUGH: 0
SHORTNESS OF BREATH: 0
HEADACHES: 1

## 2023-10-06 NOTE — PROGRESS NOTES
Subjective   Patient ID: Caprice Cowart is a 43 y.o. female who presents for No chief complaint on file..    HPI     Review of Systems   Constitutional:  Negative for fatigue and fever.   HENT: Negative.  Negative for hearing loss.    Eyes:  Negative for visual disturbance.   Respiratory:  Negative for cough, shortness of breath and wheezing.    Cardiovascular:  Negative for chest pain.   Gastrointestinal:  Positive for nausea and vomiting. Negative for constipation and diarrhea.        Occurs with h/a   Genitourinary:  Negative for difficulty urinating.   Neurological:  Positive for light-headedness and headaches. Negative for weakness and numbness.        Pt with bad chronic h/a migraines. 6 mo duration   Affecting her attendance at work. Would like [facility] paperwork completed for her h/a which can be debilitating for her  No triggers for h/a  Pain across head, throbbing and pulsating  Went to eye doctor and all ok.   H/a lasts hrs - 1 1/2 d  Pt takes ubrelvy, tylenol or ibuprofen. Ubrelvy sometimes works well.  Past meds, can't take tryptans due to reactions.   Has never seen neuro.       Objective   LMP 03/16/2023     Physical Exam  Constitutional:       General: She is not in acute distress.     Comments: This is a video telemed  virtual visit today    Pulmonary:      Effort: Pulmonary effort is normal.   Neurological:      Mental Status: She is alert.         Assessment/Plan   Problem List Items Addressed This Visit             ICD-10-CM    Migraine without aura and without status migrainosus, not intractable - Primary G43.009     Pt currently on ubrelvy  Has had side effects with tryptans  Will try topamax and adjust dose as needed  Pt has f/up already scheduled in approx 2 wk  Pt will send in [facility] paperwork         Relevant Medications    topiramate (Topamax) 25 mg tablet

## 2023-10-06 NOTE — ASSESSMENT & PLAN NOTE
Pt currently on ubrelvy  Has had side effects with tryptans  Will try topamax and adjust dose as needed  Pt has f/up already scheduled in approx 2 wk  Pt will send in [place] paperwork

## 2023-10-13 ENCOUNTER — LAB (OUTPATIENT)
Dept: LAB | Facility: LAB | Age: 43
End: 2023-10-13
Payer: COMMERCIAL

## 2023-10-13 ENCOUNTER — PHARMACY VISIT (OUTPATIENT)
Dept: PHARMACY | Facility: CLINIC | Age: 43
End: 2023-10-13
Payer: COMMERCIAL

## 2023-10-13 ENCOUNTER — OFFICE VISIT (OUTPATIENT)
Dept: PRIMARY CARE | Facility: CLINIC | Age: 43
End: 2023-10-13
Payer: COMMERCIAL

## 2023-10-13 VITALS
RESPIRATION RATE: 20 BRPM | HEART RATE: 96 BPM | WEIGHT: 228 LBS | BODY MASS INDEX: 36.64 KG/M2 | HEIGHT: 66 IN | DIASTOLIC BLOOD PRESSURE: 70 MMHG | SYSTOLIC BLOOD PRESSURE: 112 MMHG | TEMPERATURE: 98.1 F

## 2023-10-13 DIAGNOSIS — E11.9 NEW ONSET TYPE 2 DIABETES MELLITUS (MULTI): ICD-10-CM

## 2023-10-13 DIAGNOSIS — K21.9 GASTROESOPHAGEAL REFLUX DISEASE WITHOUT ESOPHAGITIS: ICD-10-CM

## 2023-10-13 DIAGNOSIS — E11.9 NEW ONSET TYPE 2 DIABETES MELLITUS (MULTI): Primary | ICD-10-CM

## 2023-10-13 LAB
ANION GAP SERPL CALC-SCNC: 16 MMOL/L (ref 10–20)
BUN SERPL-MCNC: 14 MG/DL (ref 6–23)
CALCIUM SERPL-MCNC: 9.2 MG/DL (ref 8.6–10.3)
CHLORIDE SERPL-SCNC: 102 MMOL/L (ref 98–107)
CO2 SERPL-SCNC: 22 MMOL/L (ref 21–32)
CREAT SERPL-MCNC: 0.69 MG/DL (ref 0.5–1.05)
EST. AVERAGE GLUCOSE BLD GHB EST-MCNC: 177 MG/DL
GFR SERPL CREATININE-BSD FRML MDRD: >90 ML/MIN/1.73M*2
GLUCOSE SERPL-MCNC: 143 MG/DL (ref 74–99)
HBA1C MFR BLD: 7.8 %
POTASSIUM SERPL-SCNC: 4.1 MMOL/L (ref 3.5–5.3)
SODIUM SERPL-SCNC: 136 MMOL/L (ref 136–145)

## 2023-10-13 PROCEDURE — RXMED WILLOW AMBULATORY MEDICATION CHARGE

## 2023-10-13 PROCEDURE — 83036 HEMOGLOBIN GLYCOSYLATED A1C: CPT

## 2023-10-13 PROCEDURE — 36415 COLL VENOUS BLD VENIPUNCTURE: CPT

## 2023-10-13 PROCEDURE — 80048 BASIC METABOLIC PNL TOTAL CA: CPT

## 2023-10-13 PROCEDURE — 99214 OFFICE O/P EST MOD 30 MIN: CPT | Performed by: FAMILY MEDICINE

## 2023-10-13 RX ORDER — OMEPRAZOLE 20 MG/1
20 CAPSULE, DELAYED RELEASE ORAL DAILY
Qty: 90 CAPSULE | Refills: 3 | Status: SHIPPED | OUTPATIENT
Start: 2023-10-13 | End: 2024-10-12

## 2023-10-13 ASSESSMENT — ENCOUNTER SYMPTOMS
NAUSEA: 1
SHORTNESS OF BREATH: 0
POLYDIPSIA: 1
VOMITING: 1
CONSTIPATION: 1
DIARRHEA: 1
DIFFICULTY URINATING: 0
FATIGUE: 1
WEAKNESS: 0
BLOOD IN STOOL: 0
HEMATURIA: 0
WHEEZING: 0
NUMBNESS: 0
COUGH: 0

## 2023-10-13 NOTE — ASSESSMENT & PLAN NOTE
Pt with dm, on metformin, has frequent stools, soft.  Tolerates med. Does not check bs at home  Will check bw today and adjust med as needed.  F/up 3 mo

## 2023-10-13 NOTE — PROGRESS NOTES
"Subjective   Patient ID: Caprice Cowart is a 43 y.o. female who presents for Follow-up (DM follow up (A1C on back order, will need BW)).    HPI     Review of Systems   Constitutional:  Positive for fatigue.   Eyes:  Negative for visual disturbance.        Had eye exam this yr.   Respiratory:  Negative for cough, shortness of breath and wheezing.    Cardiovascular:  Negative for chest pain.   Gastrointestinal:  Positive for constipation, diarrhea, nausea and vomiting. Negative for blood in stool.        Has n/v with migraines.      Endocrine: Positive for polydipsia. Negative for polyuria.   Genitourinary:  Negative for difficulty urinating and hematuria.   Neurological:  Negative for weakness and numbness.       Objective   /70   Pulse 96   Temp 36.7 °C (98.1 °F)   Resp 20   Ht 1.676 m (5' 6\")   Wt 103 kg (228 lb)   LMP 03/16/2023   BMI 36.80 kg/m²     Physical Exam  Vitals and nursing note reviewed.   Constitutional:       General: She is not in acute distress.     Appearance: Normal appearance.   Cardiovascular:      Rate and Rhythm: Normal rate and regular rhythm.      Heart sounds: Normal heart sounds.   Pulmonary:      Effort: Pulmonary effort is normal.      Breath sounds: Normal breath sounds.   Abdominal:      General: Abdomen is flat. Bowel sounds are normal.      Palpations: Abdomen is soft.   Skin:     General: Skin is warm and dry.   Neurological:      General: No focal deficit present.      Mental Status: She is alert.         Assessment/Plan   Problem List Items Addressed This Visit             ICD-10-CM    GERD (gastroesophageal reflux disease) K21.9     Pt on omeprazole , needs rf. Works well.         Relevant Medications    omeprazole (PriLOSEC) 20 mg DR capsule    New onset type 2 diabetes mellitus (CMS/HCC) - Primary E11.9     Pt with dm, on metformin, has frequent stools, soft.  Tolerates med. Does not check bs at home  Will check bw today and adjust med as needed.  F/up 3 mo      "    Relevant Orders    Basic Metabolic Panel    Hemoglobin A1C

## 2023-10-16 DIAGNOSIS — G43.009 MIGRAINE WITHOUT AURA AND WITHOUT STATUS MIGRAINOSUS, NOT INTRACTABLE: ICD-10-CM

## 2023-10-16 NOTE — TELEPHONE ENCOUNTER
----- Message from Jacquelyn De Paz MD sent at 10/16/2023  8:28 AM EDT -----  Call nikita, is she ok with starting a 2nd dm med/ we discussed jardiance?

## 2023-10-23 ENCOUNTER — PHARMACY VISIT (OUTPATIENT)
Dept: PHARMACY | Facility: CLINIC | Age: 43
End: 2023-10-23
Payer: COMMERCIAL

## 2023-10-23 DIAGNOSIS — E11.9 NEW ONSET TYPE 2 DIABETES MELLITUS (MULTI): ICD-10-CM

## 2023-10-23 DIAGNOSIS — G43.009 MIGRAINE WITHOUT AURA AND WITHOUT STATUS MIGRAINOSUS, NOT INTRACTABLE: ICD-10-CM

## 2023-10-23 RX ORDER — TOPIRAMATE 50 MG/1
50 TABLET, FILM COATED ORAL DAILY
Qty: 30 TABLET | Refills: 3 | Status: SHIPPED | OUTPATIENT
Start: 2023-10-23 | End: 2024-01-02 | Stop reason: SDUPTHER

## 2023-10-23 RX ORDER — UBROGEPANT 50 MG/1
1 TABLET ORAL DAILY
Qty: 10 TABLET | Refills: 3 | Status: SHIPPED | OUTPATIENT
Start: 2023-10-23 | End: 2024-05-15 | Stop reason: ALTCHOICE

## 2023-10-23 NOTE — TELEPHONE ENCOUNTER
Requested Prescriptions     Pending Prescriptions Disp Refills    topiramate (Topamax) 50 mg tablet 30 tablet 3     Sig: Take 1 tablet (50 mg) by mouth once daily.

## 2023-10-25 ENCOUNTER — PHARMACY VISIT (OUTPATIENT)
Dept: PHARMACY | Facility: CLINIC | Age: 43
End: 2023-10-25
Payer: COMMERCIAL

## 2023-10-25 PROCEDURE — RXMED WILLOW AMBULATORY MEDICATION CHARGE

## 2023-11-10 DIAGNOSIS — E11.9 NEW ONSET TYPE 2 DIABETES MELLITUS (MULTI): ICD-10-CM

## 2023-11-13 RX ORDER — SEMAGLUTIDE 0.68 MG/ML
0.25 INJECTION, SOLUTION SUBCUTANEOUS
Qty: 3 ML | Refills: 0 | Status: SHIPPED | OUTPATIENT
Start: 2023-11-13 | End: 2023-11-16 | Stop reason: SDUPTHER

## 2023-11-14 ENCOUNTER — PHARMACY VISIT (OUTPATIENT)
Dept: PHARMACY | Facility: CLINIC | Age: 43
End: 2023-11-14
Payer: COMMERCIAL

## 2023-11-14 PROCEDURE — RXMED WILLOW AMBULATORY MEDICATION CHARGE

## 2023-11-16 DIAGNOSIS — E11.9 NEW ONSET TYPE 2 DIABETES MELLITUS (MULTI): ICD-10-CM

## 2023-11-16 DIAGNOSIS — E78.1 HIGH TRIGLYCERIDES: ICD-10-CM

## 2023-11-17 ENCOUNTER — OFFICE VISIT (OUTPATIENT)
Dept: INFECTIOUS DISEASES | Facility: CLINIC | Age: 43
End: 2023-11-17
Payer: COMMERCIAL

## 2023-11-17 ENCOUNTER — PHARMACY VISIT (OUTPATIENT)
Dept: PHARMACY | Facility: CLINIC | Age: 43
End: 2023-11-17
Payer: COMMERCIAL

## 2023-11-17 ENCOUNTER — LAB (OUTPATIENT)
Dept: LAB | Facility: HOSPITAL | Age: 43
End: 2023-11-17
Payer: COMMERCIAL

## 2023-11-17 VITALS
HEART RATE: 93 BPM | HEIGHT: 67 IN | TEMPERATURE: 98.6 F | BODY MASS INDEX: 34.21 KG/M2 | DIASTOLIC BLOOD PRESSURE: 79 MMHG | WEIGHT: 218 LBS | SYSTOLIC BLOOD PRESSURE: 113 MMHG

## 2023-11-17 DIAGNOSIS — R53.83 OTHER FATIGUE: ICD-10-CM

## 2023-11-17 DIAGNOSIS — B27.00 POSITIVE TEST FOR EPSTEIN-BARR VIRUS (EBV): Primary | ICD-10-CM

## 2023-11-17 LAB
HOLD SPECIMEN: NORMAL
HOLD SPECIMEN: NORMAL
IRON SATN MFR SERPL: 10 % (ref 25–45)
IRON SERPL-MCNC: 39 UG/DL (ref 35–150)
TIBC SERPL-MCNC: 404 UG/DL (ref 240–445)
UIBC SERPL-MCNC: 365 UG/DL (ref 110–370)

## 2023-11-17 PROCEDURE — 86635 COCCIDIOIDES ANTIBODY: CPT

## 2023-11-17 PROCEDURE — 3051F HG A1C>EQUAL 7.0%<8.0%: CPT | Performed by: INTERNAL MEDICINE

## 2023-11-17 PROCEDURE — 87799 DETECT AGENT NOS DNA QUANT: CPT

## 2023-11-17 PROCEDURE — 1036F TOBACCO NON-USER: CPT | Performed by: INTERNAL MEDICINE

## 2023-11-17 PROCEDURE — 99205 OFFICE O/P NEW HI 60 MIN: CPT | Performed by: INTERNAL MEDICINE

## 2023-11-17 PROCEDURE — 83540 ASSAY OF IRON: CPT

## 2023-11-17 PROCEDURE — 3078F DIAST BP <80 MM HG: CPT | Performed by: INTERNAL MEDICINE

## 2023-11-17 PROCEDURE — 3074F SYST BP LT 130 MM HG: CPT | Performed by: INTERNAL MEDICINE

## 2023-11-17 PROCEDURE — 36415 COLL VENOUS BLD VENIPUNCTURE: CPT

## 2023-11-17 RX ORDER — ATORVASTATIN CALCIUM 20 MG/1
20 TABLET, FILM COATED ORAL DAILY
Qty: 90 TABLET | Refills: 1 | Status: SHIPPED | OUTPATIENT
Start: 2023-11-17 | End: 2024-03-25 | Stop reason: SDUPTHER

## 2023-11-17 RX ORDER — SEMAGLUTIDE 0.68 MG/ML
0.25 INJECTION, SOLUTION SUBCUTANEOUS
Qty: 3 ML | Refills: 0 | Status: SHIPPED | OUTPATIENT
Start: 2023-11-17 | End: 2024-01-11 | Stop reason: SDUPTHER

## 2023-11-17 NOTE — LETTER
11/17/23    Jacquelyn De Paz MD  [address]  [facility] [address]  [facility] [address]      Dear Dr. Jacquelyn De Paz MD,    I am writing to confirm that your patient, Caprice Cowart, received care in my office on 11/17/23. I have enclosed a summary of the care provided to Caprice for your reference.    Please contact me with any questions you may have regarding the visit.    Sincerely,         Dalton Elizabeth MD  [address] [facility] [address]  [facility] [id number]    CC: No Recipients

## 2023-11-17 NOTE — PROGRESS NOTES
"Infectious Diseases Clinic Consult Note:    Referred by  Dr. 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  4 years ago patient had EBV panel showing indeterminate nature of infection given antibody reaction to viral capsid antigen, early antigen, and nuclear antigen.  In August 2023, after returning from a short trip to [place] (for a meeting), the patient had an episode of recurrent fever, sore throat and repeat EBV serology showed identical results.  Also at that time patient had negative group A strep, influenza and COVID-19 testing. Also at that time patient had no respiratory symptoms or rash.       Over the past 5 to 10 years patient has had chronic fatigue.  This seems to come in waves and has been more frequent recently.  Patient has had no international travel.  Patient lives with her  and has no children at home.  Patient had previous work-up for infertility which included negative HIV testing.  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     Patient has a history of endometriosis which was treated surgically (peritoneal debridement and August 2022.  On 4/4/2023 patient underwent BOYD-BSO.  She reports residual endometriosis on the serosal surface of the rectum.       During episodes of \"flaring\" fatigue patient feels unwell and has low-grade fever but has been able to soldiers through ADLs and working full-time.  Patient has had no reported night sweats, weight loss, significant loss of appetite, hair loss, rash, dysuria, diarrhea, shortness of breath.         Patient has had low iron saturation in the past is been taking over-the-counter multivitamins with iron but not straight iron supplements which caused significant dyspepsia in the past.  Thyroid testing normal.          PAST MEDICAL HISTORY:  Past Medical History:   Diagnosis " Date    Abnormal uterine bleeding (AUB) 02/07/2023    Acute maxillary sinusitis 02/07/2023    Acute upper respiratory infection, unspecified 11/20/2019    Acute URI    Allergic     Allergy status to unspecified drugs, medicaments and biological substances     History of seasonal allergies    Anxiety     Body mass index (BMI) 34.0-34.9, adult 01/08/2021    Body mass index (BMI) of 34.0 to 34.9 in adult    Depression     Diabetes mellitus (CMS/HCC)     Eczema     Elevated heart rate with elevated blood pressure without diagnosis of hypertension 02/07/2023    Excessive and frequent menstruation with regular cycle 04/02/2020    Spotting    Female pelvic pain 02/07/2023    Flank pain 02/07/2023    GERD (gastroesophageal reflux disease)     Headache     Hypertension     Menorrhagia with regular cycle 02/07/2023    Obesity, unspecified 01/08/2021    Obesity (BMI 30.0-34.9)    Other abnormal glucose 01/08/2021    Elevated glucose    Personal history of other diseases of the digestive system     History of irritable bowel syndrome    Personal history of other diseases of the digestive system     History of esophageal reflux    Personal history of other diseases of the nervous system and sense organs     History of migraine    Personal history of other endocrine, nutritional and metabolic disease 07/08/2020    History of obesity    Personal history of other infectious and parasitic diseases 10/28/2019    History of viral infection    Personal history of other specified conditions 10/28/2019    History of fever    Rash and other nonspecific skin eruption 07/20/2020    Rash    Urinary tract infection, site not specified 05/30/2020    Acute UTI (urinary tract infection)    Visual impairment     Vitamin D deficiency, unspecified     Vitamin D deficiency     PAST SURGICAL HISTORY:  Past Surgical History:   Procedure Laterality Date    ADENOIDECTOMY      APPENDECTOMY  8/8/2022    HYSTERECTOMY  4/4/2023    KNEE SURGERY  08/15/2016     Knee Surgery    LAPAROSCOPIC HYSTERECTOMY      WITHOUT BILATERAL OOPHORECTOMY    LAPAROSCOPY DIAGNOSTIC / BIOPSY / ASPIRATION / LYSIS  10/14/2022    Exploratory Laparoscopy    OTHER SURGICAL HISTORY  08/15/2016    Sigmoidoscopy (Fiberoptic, Therapeutic )    OTHER SURGICAL HISTORY  08/23/2022    Laparoscopy    OTHER SURGICAL HISTORY  08/23/2022    Appendectomy laparoscopic    OTHER SURGICAL HISTORY  08/23/2022    Presacral neurectomy    TONSILLECTOMY  08/15/2016    Tonsillectomy     ALLERGIES:    Allergies   Allergen Reactions    Penicillins Hives    Promethazine Other     Vomiting    Sumatriptan Other    Sumatriptan-Naproxen Other     TEXIMET Angina, numbness with tingling    Neomycin-Bacitracin-Polymyxin Rash       MEDICATIONS:      Current Outpatient Medications:     Accu-Chek Guide Me Glucose Mtr misc, Use to test blood sugars as directed, Disp: , Rfl:     atorvastatin (Lipitor) 20 mg tablet, TAKE 1 TABLET BY MOUTH ONCE DAILY, Disp: 90 tablet, Rfl: 1    blood sugar diagnostic (Blood Glucose Test) strip, TEST twice a day, Disp: , Rfl:     blood sugar diagnostic strip, USE TO TEST BLOOD SUGAR TWO TIMES A DAY, Disp: 100 each, Rfl: 6    diazePAM (Valium) 5 mg tablet, Take by mouth every 8 hours if needed., Disp: , Rfl:     DULoxetine (Cymbalta) 60 mg DR capsule, Take 1 capsule (60 mg) by mouth once daily., Disp: , Rfl:     fexofenadine (Allegra) 180 mg tablet, Take 1 tablet (180 mg) by mouth once daily., Disp: 90 tablet, Rfl: 3    lancets misc, USE AS DIRECTED TWO TIMES A DAY, Disp: 100 each, Rfl: 6    metFORMIN (Glucophage) 500 mg tablet, Take 2 tablets (1,000 mg) by mouth 2 times a day with meals., Disp: , Rfl:     metoprolol succinate XL (Toprol-XL) 25 mg 24 hr tablet, TAKE 1 TABLET BY MOUTH ONCE DAILY, Disp: 90 tablet, Rfl: 1    montelukast (Singulair) 10 mg tablet, TAKE 1 TABLET BY MOUTH ONCE DAILY, Disp: 90 tablet, Rfl: 1    multivitamin tablet, Take by mouth., Disp: , Rfl:     omeprazole (PriLOSEC) 20 mg DR  capsule, Take 1 capsule (20 mg) by mouth once daily., Disp: 90 capsule, Rfl: 3    semaglutide (Ozempic) 0.25 mg or 0.5 mg (2 mg/3 mL) pen injector, Inject 0.25 mg under the skin 1 (one) time per week., Disp: 3 mL, Rfl: 0    topiramate (Topamax) 50 mg tablet, Take 1 tablet (50 mg) by mouth once daily., Disp: 30 tablet, Rfl: 3    ubrogepant (Ubrelvy) 50 mg tablet, Take 1 tablet (50 mg) by mouth once daily., Disp: 10 tablet, Rfl: 3    FAMILY HISTORY:   Family History   Problem Relation Name Age of Onset    Diabetes Mother Yoko     Kidney failure Mother Yoko     Heart failure Mother Yoko     Arthritis Mother Yoko     Asthma Mother Yoko     Depression Mother Yoko     Hearing loss Mother Yoko     Hyperlipidemia Mother Yoko     Kidney disease Mother Yoko     Other (ATHEROSCLEROSIS) Father Hector     Other (CABG) Father Hector     COPD Father Hector     Coronary artery disease Father Hector     Diabetes Father Hector     Hypertension Father Hector     Atrial fibrillation Father Hector     Alcohol abuse Father Hector     Hearing loss Father Hector     Heart disease Father Hector     Hyperlipidemia Father Hector     Colon cancer Paternal Grandmother Lashanda     Alcohol abuse Brother Lionel     Alcohol abuse Brother Hector II     Alcohol abuse Brother Figueroa     Drug abuse Brother Figueroa     Arthritis Mother's Sister Lyndia     Asthma Mother's Sister Lyndia     Hearing loss Mother's Sister Lyndia     Kidney disease Mother's Sister Lyndia         SOCIAL HISTORY:     Marital Status:  with  for  many years  Tobacco / Smokeless tobacco/ Vaping:   reports that she has quit smoking. Her smoking use included cigarettes. She smoked an average of 1 pack per day. She has never used smokeless tobacco.   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  Travel:  none   Pets:  dog     IMMUNIZATIONS:    Immunization History   Administered Date(s) Administered    Influenza, seasonal, injectable 10/01/2022     Moderna SARS-CoV-2 Vaccination 01/06/2021, 01/26/2021, 02/03/2021, 11/22/2021       REVIEW OF SYSTEMS:  Review of Systems    PHYSICAL EXAMINATION:     Visit Vitals  LMP 03/16/2023   OB Status Hysterectomy   Smoking Status Former        EXAM:   Healthy-appearing young woman  Well dressed and groomed  Accompanied by her   Extraocular muscles are intact  External auditory canal clear, normal tympanic membranes  No anterior posterior cervical lymphadenopathy  Clear pharynx, status post tonsillectomy  Teeth in excellent repair  No supraclavicular lymphadenopathy, no axillary lymphadenopathy  Lungs clear, S1, S2, I did not hear a murmur or rub  No CVA tenderness  No muscle tenderness  Nontender abdomen  No peripheral rash visible  Tandem gait  Pleasant and interactive, normal conversation          ASSESSMENT / RECOMMENDATIONS:    Fatigue  Persistent EBV serology in August 2023 (and  2019)       Positive EBV VCA IgG       Positive EBV early antigen IgG positive       Positive EBV nuclear antigen IgG       Positive EBV VCA IgM     Pattern of the EBV serology does not fit well with either acute, recovering, late or prior infection given the presence of IgM and early antigen IgG.  Likely patient has persistent antibody reactivity but this does not indicate new or  reactivation of the latent Abby-Barr virus.  Reactivation of latent Abby-Barr virus usually happens in the setting of significant immunosuppression.  I discussed in detail pathogenesis of EBV infection.    I do not think the serologies indicate recurrent EBV but likely persistent reactivity, perhaps false positive testing.  In this regard do not need to repeat EBV panel which may never normalize and certainly will not clarify clinical setting.    When patient has recurrent fever associated with profound increase in fatigue then would need fresh evaluation with general blood work and physical exam.  I discussed the recent emerging concepts clinically  "and in the research arena regarding post-viral syndromes that can be regarded as chronic infections but are not true infection but rather probable immune dysregulation after infection.  Long COVID-19 appears to be a good paradigm at this time.  Previous studies have not revealed diagnostic criteria fatigue associated with such prior infections as EBV or Lyme disease for that matter.  Unfortunately treatment is supportive both physically and this can include exercise for example and other  \"wellness\"  therapies that  could include diet, medication, massage therapy, music therapy or acupuncture.  Thus would recommend referral to [facility] Medicine clinic.    I did also discuss potential for EBV reactivation and \"flare\" when faced with other stressors that might include a new separate infection -perhaps COVID or influenza for example.    I do not think the patient has underlying    immunologic deficiency.  No HIV risk factors.  HIV testing per report during fertility work-up was negative.  No current HIV risk factors.  I did not recommend retesting for HIV.  Do not suspect isolated idiopathic CD4 T-cell lymphopenia    I also reviewed that perhaps patient's flare in August 2023 reflected exposure to endemic fungus, coccidiomycosis, in [place].  This is a bit of a stretch but I recommended checking Coccidioides antibodies,    Not certain to what degree patient may also have a mild component of anemia    Not certain if endometriosis could contribute especially since largely treated last year and only has residual disease localized to the rectum.    RECOMMENDATIONS:  Does not need repeat EBV serologies even if patient has a \"flare\" or other flulike syndrome.  EBV serologies will not have any diagnostic value in the future.  EBV PCR to rule out ongoing low-grade viremia.  If this were positive then patient would need work-up for possible underlying occult immunosuppression  Coccidioides antibodies to rule " out prior exposure that might explain fever, sore throat and fatigue after returning from [place] in August 2023  Iron studies to rule out new or additional deficiency  5.   Referral to [facility].  Order placed  6.  Phone contact as needed.  Patient to call with additional questions if needed.  ID office phone number is [phone]  7.  Follow-up with PCP  8.  Follow-up in infectious diseases on an as-needed basis        I spent 60 minutes in the professional and overall care of this patient.    I spent 20 minutes charting    Dalton Elizabeth MD

## 2023-11-19 LAB
EBV DNA SPEC NAA+PROBE-LOG#: NORMAL {LOG_COPIES}/ML
LABORATORY COMMENT REPORT: NOT DETECTED

## 2023-11-20 LAB — COCCIDIOIDES AB TITR SER CF: NORMAL {TITER}

## 2023-11-24 ENCOUNTER — PHARMACY VISIT (OUTPATIENT)
Dept: PHARMACY | Facility: CLINIC | Age: 43
End: 2023-11-24
Payer: COMMERCIAL

## 2023-11-24 PROCEDURE — RXMED WILLOW AMBULATORY MEDICATION CHARGE

## 2023-11-29 ENCOUNTER — PHARMACY VISIT (OUTPATIENT)
Dept: PHARMACY | Facility: CLINIC | Age: 43
End: 2023-11-29
Payer: COMMERCIAL

## 2023-11-29 DIAGNOSIS — E11.9 NEW ONSET TYPE 2 DIABETES MELLITUS (MULTI): ICD-10-CM

## 2023-11-29 PROCEDURE — RXMED WILLOW AMBULATORY MEDICATION CHARGE

## 2023-11-29 RX ORDER — METFORMIN HYDROCHLORIDE 500 MG/1
1000 TABLET ORAL
Qty: 360 TABLET | Refills: 1 | Status: SHIPPED | OUTPATIENT
Start: 2023-11-29 | End: 2024-05-21 | Stop reason: SDUPTHER

## 2023-11-29 NOTE — TELEPHONE ENCOUNTER
Patient requests prescription below    Last Office Visit: 10/13/2023     Requested Prescriptions     Pending Prescriptions Disp Refills    metFORMIN (Glucophage) 500 mg tablet 360 tablet 1     Sig: Take 2 tablets (1,000 mg) by mouth 2 times a day with meals.

## 2023-12-14 PROCEDURE — RXMED WILLOW AMBULATORY MEDICATION CHARGE

## 2023-12-18 ENCOUNTER — PHARMACY VISIT (OUTPATIENT)
Dept: PHARMACY | Facility: CLINIC | Age: 43
End: 2023-12-18
Payer: COMMERCIAL

## 2024-01-02 DIAGNOSIS — G43.009 MIGRAINE WITHOUT AURA AND WITHOUT STATUS MIGRAINOSUS, NOT INTRACTABLE: ICD-10-CM

## 2024-01-02 DIAGNOSIS — F32.0 DEPRESSION, MAJOR, SINGLE EPISODE, MILD (CMS-HCC): ICD-10-CM

## 2024-01-02 DIAGNOSIS — J30.2 SEASONAL ALLERGIES: ICD-10-CM

## 2024-01-02 PROCEDURE — RXMED WILLOW AMBULATORY MEDICATION CHARGE

## 2024-01-02 RX ORDER — DULOXETIN HYDROCHLORIDE 60 MG/1
60 CAPSULE, DELAYED RELEASE ORAL DAILY
Qty: 90 CAPSULE | Refills: 3 | Status: SHIPPED | OUTPATIENT
Start: 2024-01-02

## 2024-01-02 RX ORDER — MONTELUKAST SODIUM 10 MG/1
10 TABLET ORAL DAILY
Qty: 90 TABLET | Refills: 3 | Status: SHIPPED | OUTPATIENT
Start: 2024-01-02

## 2024-01-02 RX ORDER — TOPIRAMATE 50 MG/1
50 TABLET, FILM COATED ORAL DAILY
Qty: 90 TABLET | Refills: 3 | Status: SHIPPED | OUTPATIENT
Start: 2024-01-02 | End: 2024-01-03 | Stop reason: SDUPTHER

## 2024-01-02 NOTE — TELEPHONE ENCOUNTER
Requested Prescriptions     Pending Prescriptions Disp Refills    topiramate (Topamax) 50 mg tablet 90 tablet 3     Sig: Take 1 tablet (50 mg) by mouth once daily.    montelukast (Singulair) 10 mg tablet 90 tablet 3     Sig: Take 1 tablet (10 mg) by mouth once daily.    DULoxetine (Cymbalta) 60 mg DR capsule 90 capsule 3     Sig: Take 1 capsule (60 mg) by mouth once daily.

## 2024-01-03 DIAGNOSIS — G43.009 MIGRAINE WITHOUT AURA AND WITHOUT STATUS MIGRAINOSUS, NOT INTRACTABLE: ICD-10-CM

## 2024-01-04 RX ORDER — TOPIRAMATE 50 MG/1
50 TABLET, FILM COATED ORAL DAILY
Qty: 30 TABLET | Refills: 0 | Status: SHIPPED | OUTPATIENT
Start: 2024-01-04 | End: 2024-01-12 | Stop reason: SDUPTHER

## 2024-01-05 ENCOUNTER — PHARMACY VISIT (OUTPATIENT)
Dept: PHARMACY | Facility: CLINIC | Age: 44
End: 2024-01-05
Payer: COMMERCIAL

## 2024-01-11 DIAGNOSIS — E11.9 NEW ONSET TYPE 2 DIABETES MELLITUS (MULTI): ICD-10-CM

## 2024-01-11 RX ORDER — SEMAGLUTIDE 0.68 MG/ML
0.25 INJECTION, SOLUTION SUBCUTANEOUS
Qty: 3 ML | Refills: 0 | Status: SHIPPED | OUTPATIENT
Start: 2024-01-11 | End: 2024-02-06 | Stop reason: SDUPTHER

## 2024-01-12 ENCOUNTER — OFFICE VISIT (OUTPATIENT)
Dept: PRIMARY CARE | Facility: CLINIC | Age: 44
End: 2024-01-12
Payer: COMMERCIAL

## 2024-01-12 ENCOUNTER — LAB (OUTPATIENT)
Dept: LAB | Facility: LAB | Age: 44
End: 2024-01-12
Payer: COMMERCIAL

## 2024-01-12 ENCOUNTER — APPOINTMENT (OUTPATIENT)
Dept: INTEGRATIVE MEDICINE | Facility: CLINIC | Age: 44
End: 2024-01-12
Payer: COMMERCIAL

## 2024-01-12 VITALS
DIASTOLIC BLOOD PRESSURE: 72 MMHG | SYSTOLIC BLOOD PRESSURE: 124 MMHG | WEIGHT: 217 LBS | HEART RATE: 80 BPM | RESPIRATION RATE: 20 BRPM | TEMPERATURE: 97.2 F | HEIGHT: 67 IN | BODY MASS INDEX: 34.06 KG/M2

## 2024-01-12 DIAGNOSIS — G43.009 MIGRAINE WITHOUT AURA AND WITHOUT STATUS MIGRAINOSUS, NOT INTRACTABLE: ICD-10-CM

## 2024-01-12 DIAGNOSIS — R03.0 ELEVATED BLOOD PRESSURE READING WITHOUT DIAGNOSIS OF HYPERTENSION: ICD-10-CM

## 2024-01-12 DIAGNOSIS — E66.9 OBESITY (BMI 30-39.9): ICD-10-CM

## 2024-01-12 DIAGNOSIS — E11.9 TYPE 2 DIABETES MELLITUS WITHOUT COMPLICATION, WITHOUT LONG-TERM CURRENT USE OF INSULIN (MULTI): Primary | ICD-10-CM

## 2024-01-12 DIAGNOSIS — E11.9 TYPE 2 DIABETES MELLITUS WITHOUT COMPLICATION, WITHOUT LONG-TERM CURRENT USE OF INSULIN (MULTI): ICD-10-CM

## 2024-01-12 DIAGNOSIS — F32.0 DEPRESSION, MAJOR, SINGLE EPISODE, MILD (CMS-HCC): ICD-10-CM

## 2024-01-12 DIAGNOSIS — J30.2 SEASONAL ALLERGIES: ICD-10-CM

## 2024-01-12 PROBLEM — I10 HYPERTENSION: Status: ACTIVE | Noted: 2024-01-12

## 2024-01-12 LAB
ANION GAP SERPL CALC-SCNC: 12 MMOL/L (ref 10–20)
BUN SERPL-MCNC: 15 MG/DL (ref 6–23)
CALCIUM SERPL-MCNC: 9.2 MG/DL (ref 8.6–10.3)
CHLORIDE SERPL-SCNC: 106 MMOL/L (ref 98–107)
CO2 SERPL-SCNC: 26 MMOL/L (ref 21–32)
CREAT SERPL-MCNC: 0.68 MG/DL (ref 0.5–1.05)
CREAT UR-MCNC: 122.3 MG/DL (ref 20–320)
EGFRCR SERPLBLD CKD-EPI 2021: >90 ML/MIN/1.73M*2
EST. AVERAGE GLUCOSE BLD GHB EST-MCNC: 154 MG/DL
GLUCOSE SERPL-MCNC: 129 MG/DL (ref 74–99)
HBA1C MFR BLD: 7 %
MICROALBUMIN UR-MCNC: 17.3 MG/L
MICROALBUMIN/CREAT UR: 14.1 UG/MG CREAT
POTASSIUM SERPL-SCNC: 4.1 MMOL/L (ref 3.5–5.3)
SODIUM SERPL-SCNC: 140 MMOL/L (ref 136–145)

## 2024-01-12 PROCEDURE — 82043 UR ALBUMIN QUANTITATIVE: CPT

## 2024-01-12 PROCEDURE — 99214 OFFICE O/P EST MOD 30 MIN: CPT | Performed by: FAMILY MEDICINE

## 2024-01-12 PROCEDURE — 80048 BASIC METABOLIC PNL TOTAL CA: CPT

## 2024-01-12 PROCEDURE — 83036 HEMOGLOBIN GLYCOSYLATED A1C: CPT

## 2024-01-12 PROCEDURE — 82570 ASSAY OF URINE CREATININE: CPT

## 2024-01-12 PROCEDURE — 36415 COLL VENOUS BLD VENIPUNCTURE: CPT

## 2024-01-12 PROCEDURE — RXMED WILLOW AMBULATORY MEDICATION CHARGE

## 2024-01-12 RX ORDER — MINERAL OIL
180 ENEMA (ML) RECTAL DAILY
Qty: 90 TABLET | Refills: 3 | Status: SHIPPED | OUTPATIENT
Start: 2024-01-12 | End: 2025-01-11

## 2024-01-12 RX ORDER — METOPROLOL SUCCINATE 25 MG/1
25 TABLET, EXTENDED RELEASE ORAL DAILY
Qty: 90 TABLET | Refills: 3 | Status: SHIPPED | OUTPATIENT
Start: 2024-01-12 | End: 2025-01-11

## 2024-01-12 RX ORDER — TOPIRAMATE 50 MG/1
50 TABLET, FILM COATED ORAL DAILY
Qty: 90 TABLET | Refills: 3 | Status: SHIPPED | OUTPATIENT
Start: 2024-01-12 | End: 2024-05-15 | Stop reason: ALTCHOICE

## 2024-01-12 ASSESSMENT — ENCOUNTER SYMPTOMS
SHORTNESS OF BREATH: 0
DIARRHEA: 1
HEADACHES: 1
TREMORS: 0
SEIZURES: 0
DIZZINESS: 0
HEADACHES: 0
CONFUSION: 0
HEMATURIA: 0
VISUAL CHANGE: 0
COUGH: 0
WEIGHT LOSS: 1
DYSPHORIC MOOD: 1
NERVOUS/ANXIOUS: 1
MYALGIAS: 1
WEAKNESS: 0
POLYDIPSIA: 1
VOMITING: 0
POLYDIPSIA: 0
BLACKOUTS: 0
NERVOUS/ANXIOUS: 0
NUMBNESS: 0
NAUSEA: 0
HUNGER: 0
POLYPHAGIA: 0
FATIGUE: 1
BLOOD IN STOOL: 0
SWEATS: 0
BLURRED VISION: 0
WHEEZING: 0
SPEECH DIFFICULTY: 0
ABDOMINAL DISTENTION: 1

## 2024-01-12 NOTE — PROGRESS NOTES
Subjective   Patient ID: Caprice Cowart is a 44 y.o. female who presents for Follow-up (DM follow up (A1c on back order, will need bw)).    Diabetes  She has type 2 diabetes mellitus. No MedicAlert identification noted. The initial diagnosis of diabetes was made 2 years ago. Hypoglycemia symptoms include headaches and nervousness/anxiousness. Pertinent negatives for hypoglycemia include no hunger, mood changes, sleepiness or sweats. Associated symptoms include fatigue, polydipsia and weight loss. Pertinent negatives for diabetes include no blurred vision, no chest pain, no foot paresthesias, no foot ulcerations, no polyuria, no visual change and no weakness. Pertinent negatives for hypoglycemia complications include no blackouts, no hospitalization, no nocturnal hypoglycemia, no required assistance and no required glucagon injection. Symptoms are improving. Pertinent negatives for diabetic complications include no CVA, heart disease, impotence, nephropathy, peripheral neuropathy, PVD or retinopathy. Risk factors for coronary artery disease include dyslipidemia, family history, hypertension, obesity and stress. Current diabetic treatment includes diet and oral agent (dual therapy). She is compliant with treatment all of the time. Her weight is decreasing steadily. She is following a generally healthy diet. When asked about meal planning, she reported none. She has not had a previous visit with a dietitian. She participates in exercise three times a week. She monitors blood glucose at home 1-2 x per day. She monitors urine at home <1 x per month. Blood glucose monitoring compliance is good. Her home blood glucose trend is increasing steadily. Her breakfast blood glucose is taken between 5-6 am. Her breakfast blood glucose range is generally 140-180 mg/dl. Her dinner blood glucose is taken between 5-6 pm. Her dinner blood glucose range is generally 140-180 mg/dl. She sees a podiatrist.Eye exam is current.     "    Review of Systems   Constitutional:  Positive for fatigue and weight loss.        Pt has had fatigue for yrs.  Has seen ID and has been tested.  Pt was referred to [facility]   Eyes:  Negative for blurred vision.   Respiratory:  Negative for cough, shortness of breath and wheezing.    Cardiovascular:  Negative for chest pain.   Gastrointestinal:  Positive for abdominal distention and diarrhea. Negative for blood in stool, nausea and vomiting.        Side effects with ozempic   Endocrine: Positive for polydipsia. Negative for polyuria.   Genitourinary:  Negative for hematuria and impotence.   Musculoskeletal:  Positive for myalgias.   Neurological:  Positive for headaches. Negative for weakness and numbness.        Has migraine h/a   Psychiatric/Behavioral:  Positive for dysphoric mood. Negative for suicidal ideas. The patient is nervous/anxious.         Pt with anx/depression, on meds, working well.Has been stable       Objective   /72   Pulse 80   Temp 36.2 °C (97.2 °F)   Resp 20   Ht 1.689 m (5' 6.5\")   Wt 98.4 kg (217 lb)   LMP 03/16/2023   BMI 34.50 kg/m²     Physical Exam  Vitals and nursing note reviewed.   Constitutional:       General: She is not in acute distress.     Appearance: Normal appearance.   HENT:      Head: Normocephalic and atraumatic.   Cardiovascular:      Rate and Rhythm: Normal rate and regular rhythm.      Heart sounds: Normal heart sounds.   Pulmonary:      Effort: Pulmonary effort is normal.      Breath sounds: Normal breath sounds.   Abdominal:      General: Abdomen is flat. Bowel sounds are normal.      Palpations: Abdomen is soft.   Skin:     General: Skin is warm and dry.   Neurological:      General: No focal deficit present.      Mental Status: She is alert.   Psychiatric:         Mood and Affect: Mood normal.         Behavior: Behavior normal.         Assessment/Plan   Problem List Items Addressed This Visit             ICD-10-CM    Depression, major, " single episode, mild (CMS/HCC) F32.0     Pt with anx/depression, on meds, sees psych, has been stable         Seasonal allergies J30.2    Relevant Medications    fexofenadine (Allegra) 180 mg tablet    Obesity (BMI 30-39.9) E66.9     Advise healthy diet and exercise  Pt with dm and is on ozempic         Migraine without aura and without status migrainosus, not intractable G43.009    Relevant Medications    topiramate (Topamax) 50 mg tablet    Diabetes mellitus (CMS/HCC) - Primary E11.9     Pt on metformin and ozempic  Ozempic causes burping in the am. Causes slowdown in gi transit. Has modified diet for the first day or 2 after inj.  Bs are 140's    Eating healthier diet.  Needs bw today           Relevant Orders    Albumin , Urine Random    Basic Metabolic Panel    Hemoglobin A1C    Elevated blood pressure reading without diagnosis of hypertension R03.0     Pt with htn, on metoprolol, works well without side effects  Needs rf         Relevant Medications    metoprolol succinate XL (Toprol-XL) 25 mg 24 hr tablet

## 2024-01-12 NOTE — ASSESSMENT & PLAN NOTE
Pt on metformin and ozempic  Ozempic causes burping in the am. Causes slowdown in gi transit. Has modified diet for the first day or 2 after inj.  Bs are 140's    Eating healthier diet.  Needs bw today

## 2024-01-15 ENCOUNTER — OFFICE VISIT (OUTPATIENT)
Dept: PRIMARY CARE | Facility: CLINIC | Age: 44
End: 2024-01-15
Payer: COMMERCIAL

## 2024-01-15 VITALS
OXYGEN SATURATION: 98 % | RESPIRATION RATE: 18 BRPM | WEIGHT: 216 LBS | DIASTOLIC BLOOD PRESSURE: 78 MMHG | TEMPERATURE: 98.3 F | BODY MASS INDEX: 34.34 KG/M2 | HEART RATE: 90 BPM | SYSTOLIC BLOOD PRESSURE: 126 MMHG

## 2024-01-15 DIAGNOSIS — R39.9 UTI SYMPTOMS: ICD-10-CM

## 2024-01-15 LAB
POC APPEARANCE, URINE: ABNORMAL
POC BILIRUBIN, URINE: ABNORMAL
POC BLOOD, URINE: ABNORMAL
POC COLOR, URINE: YELLOW
POC GLUCOSE, URINE: NEGATIVE MG/DL
POC KETONES, URINE: ABNORMAL MG/DL
POC LEUKOCYTES, URINE: NEGATIVE
POC NITRITE,URINE: POSITIVE
POC PH, URINE: 5 PH
POC PROTEIN, URINE: ABNORMAL MG/DL
POC SPECIFIC GRAVITY, URINE: 1.02
POC UROBILINOGEN, URINE: 1 EU/DL

## 2024-01-15 PROCEDURE — 3051F HG A1C>EQUAL 7.0%<8.0%: CPT | Performed by: FAMILY MEDICINE

## 2024-01-15 PROCEDURE — 1036F TOBACCO NON-USER: CPT | Performed by: FAMILY MEDICINE

## 2024-01-15 PROCEDURE — 87186 SC STD MICRODIL/AGAR DIL: CPT

## 2024-01-15 PROCEDURE — 99214 OFFICE O/P EST MOD 30 MIN: CPT | Performed by: FAMILY MEDICINE

## 2024-01-15 PROCEDURE — 81002 URINALYSIS NONAUTO W/O SCOPE: CPT | Performed by: FAMILY MEDICINE

## 2024-01-15 PROCEDURE — 3074F SYST BP LT 130 MM HG: CPT | Performed by: FAMILY MEDICINE

## 2024-01-15 PROCEDURE — 3061F NEG MICROALBUMINURIA REV: CPT | Performed by: FAMILY MEDICINE

## 2024-01-15 PROCEDURE — 3078F DIAST BP <80 MM HG: CPT | Performed by: FAMILY MEDICINE

## 2024-01-15 PROCEDURE — 87086 URINE CULTURE/COLONY COUNT: CPT

## 2024-01-15 RX ORDER — SULFAMETHOXAZOLE AND TRIMETHOPRIM 800; 160 MG/1; MG/1
1 TABLET ORAL 2 TIMES DAILY
Qty: 14 TABLET | Refills: 0 | Status: SHIPPED | OUTPATIENT
Start: 2024-01-15 | End: 2024-01-23 | Stop reason: ALTCHOICE

## 2024-01-15 ASSESSMENT — ENCOUNTER SYMPTOMS
VOMITING: 0
RESPIRATORY NEGATIVE: 1
DYSURIA: 1
NAUSEA: 0
HEMATURIA: 1
DIARRHEA: 0
FATIGUE: 0
ABDOMINAL PAIN: 1
FLANK PAIN: 1
FEVER: 0
MYALGIAS: 0

## 2024-01-15 NOTE — PROGRESS NOTES
Subjective   Patient ID: Caprice Cowart is a 44 y.o. female who presents for Abdominal Pain.    Abdominal Pain  The current episode started today. Associated symptoms include dysuria and hematuria. Pertinent negatives include no diarrhea, fever, myalgias, nausea or vomiting.        Review of Systems   Constitutional:  Negative for fatigue and fever.   Respiratory: Negative.     Gastrointestinal:  Positive for abdominal pain. Negative for diarrhea, nausea and vomiting.   Genitourinary:  Positive for dysuria, flank pain and hematuria. Negative for vaginal bleeding and vaginal discharge.        Duration 1 d  R flank pain   Musculoskeletal:  Negative for myalgias.       Objective   /78   Pulse 90   Temp 36.8 °C (98.3 °F)   Resp 18   Wt 98 kg (216 lb)   LMP 03/16/2023   SpO2 98%   BMI 34.34 kg/m²     Physical Exam  Vitals and nursing note reviewed.   Constitutional:       General: She is not in acute distress.     Appearance: Normal appearance.   HENT:      Head: Normocephalic and atraumatic.   Cardiovascular:      Rate and Rhythm: Normal rate and regular rhythm.      Heart sounds: Normal heart sounds.   Pulmonary:      Effort: Pulmonary effort is normal.      Breath sounds: Normal breath sounds.   Abdominal:      General: Abdomen is flat. Bowel sounds are normal.      Palpations: Abdomen is soft.      Tenderness: There is abdominal tenderness. There is right CVA tenderness. There is no guarding or rebound.      Comments: Right lower abd pain to palp. No peritoneal signs   Skin:     General: Skin is warm and dry.   Neurological:      Mental Status: She is alert.         Assessment/Plan   Problem List Items Addressed This Visit             ICD-10-CM    UTI symptoms R39.9     Advise pt take atbx as directed  Increase flds  F/up if no improvement         Relevant Medications    sulfamethoxazole-trimethoprim (Bactrim DS) 800-160 mg tablet    Other Relevant Orders    POCT UA (nonautomated) manually resulted  (Completed)    Urine Culture

## 2024-01-16 ENCOUNTER — PHARMACY VISIT (OUTPATIENT)
Dept: PHARMACY | Facility: CLINIC | Age: 44
End: 2024-01-16
Payer: COMMERCIAL

## 2024-01-16 PROCEDURE — RXMED WILLOW AMBULATORY MEDICATION CHARGE

## 2024-01-17 LAB — BACTERIA UR CULT: ABNORMAL

## 2024-01-23 ENCOUNTER — TELEMEDICINE (OUTPATIENT)
Dept: PHARMACY | Facility: HOSPITAL | Age: 44
End: 2024-01-23
Payer: COMMERCIAL

## 2024-01-23 DIAGNOSIS — E11.9 TYPE 2 DIABETES MELLITUS WITHOUT COMPLICATION, WITHOUT LONG-TERM CURRENT USE OF INSULIN (MULTI): Primary | ICD-10-CM

## 2024-01-23 RX ORDER — ACETAMINOPHEN 500 MG
5000 TABLET ORAL DAILY
COMMUNITY

## 2024-01-23 NOTE — ASSESSMENT & PLAN NOTE
1. CONTINUE taking metformin 500mg 2 tablets twice daily with meals and Ozempic 0.25mg injected under the skin once weekly  2. CONTINUE measuring blood glucose twice daily  3. FOLLOW UP with primary care provider as directed

## 2024-01-23 NOTE — PROGRESS NOTES
[facility] ([facility])    Caprice Cowart is a 44 y.o. female was referred to Clinical Pharmacy Team to complete a comprehensive medication review (CMR) with a pharmacist as part of the [employer] diabetes program.      Referring Provider: Jacquelyn De Paz MD    Subjective   Allergies   Allergen Reactions    Penicillins Hives    Promethazine Other     Vomiting    Sumatriptan Other     Angina/chest pressure    Sumatriptan-Naproxen Other     TEXIMET Angina, numbness with tingling    Neomycin-Bacitracin-Polymyxin Rash       [facility]  [address]  [place] [facility] [place] [address]  Phone: [phone] Fax: [phone]      Reviewed medication list and allergies with patient. Patient demonstrated familiarity with medication list and was able to complete the review independently. She meausres her blood sugar at home twice daily and reports -150 mg/dL and -150 mg/dL, but states that PPG reading is not always at a consistent time in regards to meals. She denies any questions or concerns regarding her current medications.         Objective     LMP 03/16/2023      LAB  Lab Results   Component Value Date    BILITOT 0.3 07/14/2023    CALCIUM 9.2 01/12/2024    CO2 26 01/12/2024     01/12/2024    CREATININE 0.68 01/12/2024    GLUCOSE 129 (H) 01/12/2024    ALKPHOS 131 (H) 07/14/2023    K 4.1 01/12/2024    PROT 7.2 07/14/2023     01/12/2024    AST 14 07/14/2023    ALT 21 07/14/2023    BUN 15 01/12/2024    ANIONGAP 12 01/12/2024    ALBUMIN 4.1 07/14/2023    GFRF >90 07/14/2023     Lab Results   Component Value Date    TRIG 215 (H) 06/17/2023    CHOL 154 06/17/2023    HDL 58.7 06/17/2023     Lab Results   Component Value Date    HGBA1C 7.0 (H) 01/12/2024       Current Outpatient Medications on File Prior to Visit   Medication Sig Dispense Refill    Accu-Chek Guide Me Glucose Mtr misc Use to test blood sugars as directed twice daily       atorvastatin (Lipitor) 20 mg tablet TAKE 1 TABLET BY MOUTH ONCE DAILY 90 tablet 1    blood sugar diagnostic (Blood Glucose Test) strip TEST twice a day      blood sugar diagnostic strip USE TO TEST BLOOD SUGAR TWO TIMES A  each 6    diazePAM (Valium) 5 mg tablet Take by mouth every 8 hours if needed.      DULoxetine (Cymbalta) 60 mg DR capsule Take 1 capsule (60 mg) by mouth once daily. 90 capsule 3    fexofenadine (Allegra) 180 mg tablet Take 1 tablet (180 mg) by mouth once daily. 90 tablet 3    lancets misc USE AS DIRECTED TWO TIMES A  each 6    metFORMIN (Glucophage) 500 mg tablet Take 2 tablets (1,000 mg) by mouth 2 times a day with meals. 360 tablet 1    metoprolol succinate XL (Toprol-XL) 25 mg 24 hr tablet Take 1 tablet (25 mg) by mouth once daily. Do not crush or chew. 90 tablet 3    montelukast (Singulair) 10 mg tablet Take 1 tablet (10 mg) by mouth once daily. 90 tablet 3    multivitamin tablet Take by mouth.      omeprazole (PriLOSEC) 20 mg DR capsule Take 1 capsule (20 mg) by mouth once daily. 90 capsule 3    semaglutide (Ozempic) 0.25 mg or 0.5 mg (2 mg/3 mL) pen injector Inject 0.25 mg under the skin 1 (one) time per week. 3 mL 0    topiramate (Topamax) 50 mg tablet Take 1 tablet (50 mg) by mouth once daily. 90 tablet 3    ubrogepant (Ubrelvy) 50 mg tablet Take 1 tablet (50 mg) by mouth once daily. (Patient taking differently: Take 1 tablet (50 mg) by mouth once daily as needed.) 10 tablet 3    cholecalciferol (Vitamin D3) 5,000 Units tablet Take 1 tablet (5,000 Units) by mouth once daily.      [DISCONTINUED] sulfamethoxazole-trimethoprim (Bactrim DS) 800-160 mg tablet Take 1 tablet by mouth 2 times a day for 7 days. 14 tablet 0     No current facility-administered medications on file prior to visit.        HISTORICAL PHARMACOTHERAPY (MED+REASON FOR DC)  -N/A    SECONDARY PREVENTION  - Statin? yes: Atorvastatin 20mg  - ACE-I/ARB? no    ASSESSMENT/PLAN:   Employee Health Diabetes  Program ([facility])  - Patient enrolled in  Employee diabetes program for $0 co-pays on diabetes medications/supplies. Enrollment should be active in 2-4 weeks.  - Requested [facility] enrollment date: 1/23/2024  - PharmD Management Level: 1    Assessment/Plan   Problem List Items Addressed This Visit       Diabetes mellitus (CMS/[facility]) - Primary     1. CONTINUE taking metformin 500mg 2 tablets twice daily with meals and Ozempic 0.25mg injected under the skin once weekly  2. CONTINUE measuring blood glucose twice daily  3. FOLLOW UP with primary care provider as directed           Follow up with Clinical Pharmacy Team 1 year VB[facility] Re-enrollment tentativly scheduled for 1/22/2024 at 10:30am    Continue all meds under the continuation of care with the referring provider and clinical pharmacy team.    Please reach out to the Clinical Pharmacy Team if there are any further questions.     Verbal consent to manage patient's drug therapy was obtained from patient. They were informed they may decline to participate or withdraw from participation in pharmacy services at any time.    Treva Wakefield, PharmD  PGY-1 Pharmacy Resident  [phone]

## 2024-01-25 PROCEDURE — RXMED WILLOW AMBULATORY MEDICATION CHARGE

## 2024-01-29 ENCOUNTER — PHARMACY VISIT (OUTPATIENT)
Dept: PHARMACY | Facility: CLINIC | Age: 44
End: 2024-01-29
Payer: COMMERCIAL

## 2024-02-02 ENCOUNTER — ALLIED HEALTH (OUTPATIENT)
Dept: INTEGRATIVE MEDICINE | Facility: CLINIC | Age: 44
End: 2024-02-02
Payer: COMMERCIAL

## 2024-02-02 DIAGNOSIS — E61.1 LOW IRON: ICD-10-CM

## 2024-02-02 DIAGNOSIS — E55.9 VITAMIN D DEFICIENCY: ICD-10-CM

## 2024-02-02 DIAGNOSIS — R53.83 FATIGUE, UNSPECIFIED TYPE: Primary | ICD-10-CM

## 2024-02-02 PROCEDURE — 99215 OFFICE O/P EST HI 40 MIN: CPT | Performed by: PHYSICIAN ASSISTANT

## 2024-02-02 PROCEDURE — 99417 PROLNG OP E/M EACH 15 MIN: CPT | Performed by: PHYSICIAN ASSISTANT

## 2024-02-02 SDOH — ECONOMIC STABILITY: FOOD INSECURITY: WITHIN THE PAST 12 MONTHS, YOU WORRIED THAT YOUR FOOD WOULD RUN OUT BEFORE YOU GOT MONEY TO BUY MORE.: NEVER TRUE

## 2024-02-02 SDOH — SOCIAL STABILITY: SOCIAL NETWORK: I HAVE TROUBLE DOING ALL OF MY REGULAR LEISURE ACTIVITIES WITH OTHERS: RARELY

## 2024-02-02 SDOH — SOCIAL STABILITY: SOCIAL NETWORK: I HAVE TROUBLE DOING ALL OF THE ACTIVITIES WITH FRIENDS THAT I WANT TO DO: RARELY

## 2024-02-02 SDOH — ECONOMIC STABILITY: FOOD INSECURITY: WITHIN THE PAST 12 MONTHS, THE FOOD YOU BOUGHT JUST DIDN'T LAST AND YOU DIDN'T HAVE MONEY TO GET MORE.: NEVER TRUE

## 2024-02-02 SDOH — SOCIAL STABILITY: SOCIAL NETWORK: I HAVE TROUBLE DOING ALL OF MY USUAL WORK (INCLUDE WORK AT HOME): RARELY

## 2024-02-02 SDOH — SOCIAL STABILITY: SOCIAL NETWORK: I HAVE TROUBLE DOING ALL OF THE FAMILY ACTIVITIES THAT I WANT TO DO: RARELY

## 2024-02-02 SDOH — SOCIAL STABILITY: SOCIAL NETWORK: PROMIS ABILITY TO PARTICIPATE IN SOCIAL ROLES & ACTIVITIES T-SCORE: 52

## 2024-02-02 SDOH — SOCIAL STABILITY: SOCIAL NETWORK

## 2024-02-02 ASSESSMENT — PROMIS GLOBAL HEALTH SCALE
CARRYOUT_PHYSICAL_ACTIVITIES: COMPLETELY
RATE_AVERAGE_PAIN: 3
RATE_SOCIAL_SATISFACTION: GOOD
RATE_GENERAL_HEALTH: GOOD
RATE_MENTAL_HEALTH: VERY GOOD
RATE_PHYSICAL_HEALTH: GOOD
RATE_QUALITY_OF_LIFE: GOOD
CARRYOUT_SOCIAL_ACTIVITIES: GOOD
RATE_AVERAGE_FATIGUE: MODERATE
EMOTIONAL_PROBLEMS: OFTEN

## 2024-02-02 ASSESSMENT — ANXIETY QUESTIONNAIRES
PAST MONTH HOW OFTEN HAVE YOU FELT CONFIDENT ABOUT YOUR ABILITY TO HANDLE YOUR PROBLEMS: 0 - NEVER
PAST MONTH HOW OFTEN HAVE YOU FELT THAT YOU WERE UNABLE TO CONTROL THE IMPORTANT THINGS IN YOUR LIFE: 0 - NEVER
PAST MONTH HOW OFTEN HAVE YOU FELT THAT THINGS WERE GOING YOUR WAY: 2 - SOMETIMES
PAST MONTH HOW OFTEN HAVE YOU FELT THAT YOU WERE UNABLE TO CONTROL THE IMPORTANT THINGS IN YOUR LIFE: 0 - NEVER
PAST MONTH HOW OFTEN HAVE YOU FELT CONFIDENT ABOUT YOUR ABILITY TO HANDLE YOUR PROBLEMS: 0 - NEVER
PAST MONTH HOW OFTEN HAVE YOU FELT DIFFICULTIES WERE PILING UP SO HIGH THAT YOU COULD NOT OVERCOME THEM: 4 - VERY OFTEN

## 2024-02-02 NOTE — PROGRESS NOTES
43 y/o female with PMH DMII, HTN, seasonal allergies, vit D deficiency, GERD, endometriosis, anxiety, depression, migraine, eczema presents for initial consultation regarding Chronic Fatigue. Referred by ID specialist, Dr. Elizabeth.   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 Lives with .     Goal of Visit: plan for stress, chronic fatigue, anxiety    Care Team: PCP, Ob/gyn, counselor once monthly    Schedule: Wakes up 415/430am, Mon-Sat to drive  to work (30 minute commute). Drives back home and might fall back asleep for a bit. Work 8a-430p, sometimes later (30 minute commute). PM- dinner, taking care of dogs, bed by 830/9pm. Evenings are very rushed- no time for self-care.     Somatic Complaints:  - Fatigue x 5 years- rarely feels refreshed, might be better after a nap.  - Migraines- reduced in frequency, worsened after hysterecomy, started topiramate and ubrelvy  - Tension headaches- motrin or tylenol PRN few times per month  - Brain fog- word finding difficulty since starting Topirimate; occ gets lost while driving (I.e. 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 Sleep onset- 30 minutes, sometimes delayed due to body/mind restlessness. Maintenance- dogs, few times per week. +Snoring. -IRENE (last sleep study?)  - Caffeine- 1 cup coffee AM  - Water- 64 oz water  - ETOH- rare  - Late night eating- occasional dinner    Physical  Activity:  Stretching, yoga occasionally  Slightly more sedentary at work compared to previous position       Stress Management:  - Stressors- work, short staffed, helps elderly parents and MIL, overwhelmed, house organization, 's health (autoimmune), fatigue, perfectionist tendencies, cannot get caught up   - Stress in the body- fatigue, GI symptoms, brain fog, headaches, mind racing, shorter temper, withdraws   - Coping strategies- venting, familiar with yoga and gratitude    Discussed autonomic nervous system, acute vs. chronic stress in the body, neuroscience of mindfulness, and different mindfulness practices.       Nutrition: uses food delivery ([employer]).  needs to help out more with food prep. He is a picky eater- no veggies. Protein- nuts, protein shakes, red meat, pork, chicken, fish (rare- tilapia, salmon). Caprice does not tolerate soy. Takeout/fast food- 1x/week.  Breakfast- protein shake, oatmeal with berries and nuts  Lunch- premade or cafeteria soup, sandwich, salad  Dinner- pizza, pasta, protein/potato/green beans  Snacks-pretzels, sweets, candy bar  Drinks- as above, rare smoothie    Assessment/Plan:  Stress- breath work, movement, nature  Sleep hygiene  Acupuncture   Massage once per month   Wind down routine- epsom salt bath, yoga stretching, journal   Nature after work  Labs- ferritin, cbc with diff, vit D, B12, folate    Next visits:  Sleep and stress?  Nutrition- protein, snacks, smoothies  Movement- PT?  Screen reduction   Supps?- B complex, omega 3          Review of Systems:  Constitutional: afebrile, +fatigue  Respiratory: no shortness of breath  Cardiovascular: no chest  pain  Abdominal: no abdominal pain, no n/v/d  Musculoskeletal: +joint pain or swelling   Skin: no rash      Physical Exam:   General: alert and oriented; well-developed, well-nourished, no acute distress  HEENT: normocephalic, atraumatic, good eye contact  Respiratory: no labored breathing, no  conversational dyspnea  Musculoskeletal: moving all extremities appropriately  Neurology: normal gait  Psych: pleasant affect, cooperative

## 2024-02-04 ENCOUNTER — LAB REQUISITION (OUTPATIENT)
Dept: LAB | Facility: HOSPITAL | Age: 44
End: 2024-02-04
Payer: COMMERCIAL

## 2024-02-04 LAB — SARS-COV-2 RNA RESP QL NAA+PROBE: DETECTED

## 2024-02-04 PROCEDURE — 87635 SARS-COV-2 COVID-19 AMP PRB: CPT

## 2024-02-05 NOTE — PATIENT INSTRUCTIONS
Referral to acupuncture and massage therapy  Complete labs prior to next visit  Lifestyle:  Recommend to discuss 's start time with his employer because this sleep schedule is negatively impacting you and your 's health.   Work on sleep optimization:  Create a wind down routine 30 minutes before bed:  Turn off screens and reduce house lighting  Epsom salt bath or shower  Gentle yoga stretching (i.e. yin yoga)  Journaling  Stress management  Use breath work three times daily (i.e. before each meal)  4-7-8 breath, box breathing, affirmations  Spend 10 intentional minutes in nature (phone free!). While outside, name:  5 things you see  4 things you hear  3 things you smell  2 things you feel  1 thing you taste  Bring more movement into your day  Park farther away, take the stairs, and stand up once per hour to walk around.  Take a 5 minute walk after you get home from work  Handouts:  Sleep hygiene

## 2024-02-06 DIAGNOSIS — E11.9 NEW ONSET TYPE 2 DIABETES MELLITUS (MULTI): ICD-10-CM

## 2024-02-06 PROCEDURE — RXMED WILLOW AMBULATORY MEDICATION CHARGE

## 2024-02-06 RX ORDER — SEMAGLUTIDE 0.68 MG/ML
0.25 INJECTION, SOLUTION SUBCUTANEOUS
Qty: 3 ML | Refills: 0 | Status: SHIPPED | OUTPATIENT
Start: 2024-02-06 | End: 2024-03-05 | Stop reason: SDUPTHER

## 2024-02-07 ENCOUNTER — TELEPHONE (OUTPATIENT)
Dept: PEDIATRICS | Facility: CLINIC | Age: 44
End: 2024-02-07
Payer: COMMERCIAL

## 2024-02-07 NOTE — TELEPHONE ENCOUNTER
Patient has Covid horrible cough, went to dr ellis personally and asked her if she can send in the cough meds for covid, she said that is fine, please do, Thank you!

## 2024-02-08 ENCOUNTER — PHARMACY VISIT (OUTPATIENT)
Dept: PHARMACY | Facility: CLINIC | Age: 44
End: 2024-02-08
Payer: COMMERCIAL

## 2024-02-08 DIAGNOSIS — R05.1 ACUTE COUGH: Primary | ICD-10-CM

## 2024-02-08 PROCEDURE — RXMED WILLOW AMBULATORY MEDICATION CHARGE

## 2024-02-08 RX ORDER — BENZONATATE 100 MG/1
100 CAPSULE ORAL 3 TIMES DAILY PRN
Qty: 42 CAPSULE | Refills: 0 | Status: SHIPPED | OUTPATIENT
Start: 2024-02-08 | End: 2024-03-09

## 2024-02-09 ENCOUNTER — PHARMACY VISIT (OUTPATIENT)
Dept: PHARMACY | Facility: CLINIC | Age: 44
End: 2024-02-09
Payer: COMMERCIAL

## 2024-02-14 ENCOUNTER — ALLIED HEALTH (OUTPATIENT)
Dept: INTEGRATIVE MEDICINE | Facility: CLINIC | Age: 44
End: 2024-02-14
Payer: COMMERCIAL

## 2024-02-14 DIAGNOSIS — M54.2 CHRONIC NECK PAIN: ICD-10-CM

## 2024-02-14 DIAGNOSIS — G43.009 MIGRAINE WITHOUT AURA AND WITHOUT STATUS MIGRAINOSUS, NOT INTRACTABLE: Primary | ICD-10-CM

## 2024-02-14 DIAGNOSIS — G89.29 CHRONIC NECK PAIN: ICD-10-CM

## 2024-02-14 PROCEDURE — 97811 ACUP 1/> W/O ESTIM EA ADD 15: CPT | Performed by: ACUPUNCTURIST

## 2024-02-14 PROCEDURE — 97810 ACUP 1/> WO ESTIM 1ST 15 MIN: CPT | Performed by: ACUPUNCTURIST

## 2024-02-14 PROCEDURE — 99202 OFFICE O/P NEW SF 15 MIN: CPT | Performed by: ACUPUNCTURIST

## 2024-02-25 ASSESSMENT — ENCOUNTER SYMPTOMS
NECK PAIN: 1
VOMITING: 1
HEADACHES: 1
NAUSEA: 1

## 2024-02-26 NOTE — PROGRESS NOTES
"Acupuncture Visit:     Please note: Dictation software was used while completing this note and may contain spelling, grammatical, and/or syntax errors.  Please contact me with any questions.    To clinicians, I am always happy to partner with you in the care of your patients. Do not hesitate to call the office or contact me directly regarding any further consultations or with questions regarding the plan of care outlined or patient progress.    Subjective   Patient ID: Caprice Cowart is a 44 y.o. female who presents for Migraine and Neck Pain    Insurance visit 1 of 20    Pt came in today seeking treatment for chronic neck pain and migraine; [place] [place]    Patient stated that she has had migraines since her early 20s, and they became worse after her hysterectomy; she has not had any migraines since Christmas, taking both Ubrelvy and Topamax; her migraines are generally bilaterally temporal, lasting hours to days, accompanied by noise sensitivity, nausea, and vomiting; patient also gets tension headaches from neck pain    Patient had a hysterectomy 4/2023 due to stage IV endometriosis, noting that she does not feel like she has \"bounced back\" like she expected; she is also experiencing fatigue, noting that she is recovering from COVID    Migraine   This is a chronic problem. The problem has been rapidly improving. The pain is located in the Temporal region. Associated symptoms include nausea, neck pain, phonophobia and vomiting.   Neck Pain   This is a chronic problem. Associated symptoms include headaches.       Session Information  Is this acupuncture treatment being billed to the patient's insurance company: Yes  This is visit number: 1  The patient has a total number of visits of: 20  Initial Acupuncture Treatment date: 02/14/24  Name of Insurance Company:  Employee Medical Plan  Visit Type: New patient  Medical History Reviewed: I have reviewed pertinent medical history in " EHR, and no contraindications are present to provide treatment         Review of Systems   Gastrointestinal:  Positive for nausea and vomiting.   Musculoskeletal:  Positive for neck pain.   Neurological:  Positive for headaches.            Provider reviewed plan for the acupuncture session, precautions and contraindications. Patient/guardian/hospital staff has given consent to treat with full understanding of what to expect during the session. Before acupuncture began, provider explained to the patient to communicate at any time if the procedure was causing discomfort past their tolerance level. Patient agreed to advise acupuncturist. The acupuncturist counseled the patient on the risks of acupuncture treatment including pain, infection, bleeding, and no relief of pain. The patient was positioned comfortably. There was no evidence of infection at the site of needle insertions.    Objective   Physical Exam         Treatment Plan  Treatment Goals: Pain management, Wellbeing improvement    Acupuncture Treatment  Patient Position: Supine on a table  Needle Guage: 40 guage /.16/ Red seirin  Body Points - Bilateral: KD7, KD10, KM immune x2, SP9, ST Qi line x3  Other Techniques Utilized: TDP Lamp  TDP Lamp Descripton: Feet, 20min  Needle Count In: 16  Needle Count Out: 16  Needle Retention Time (min): 20 minutes  Total Face to Face Time (min): 25 minutes              Assessment/Plan

## 2024-02-27 ENCOUNTER — ALLIED HEALTH (OUTPATIENT)
Dept: INTEGRATIVE MEDICINE | Facility: CLINIC | Age: 44
End: 2024-02-27
Payer: COMMERCIAL

## 2024-02-27 DIAGNOSIS — G89.29 CHRONIC NECK PAIN: Primary | ICD-10-CM

## 2024-02-27 DIAGNOSIS — G43.009 MIGRAINE WITHOUT AURA AND WITHOUT STATUS MIGRAINOSUS, NOT INTRACTABLE: ICD-10-CM

## 2024-02-27 DIAGNOSIS — M54.2 CHRONIC NECK PAIN: Primary | ICD-10-CM

## 2024-02-27 PROCEDURE — RXMED WILLOW AMBULATORY MEDICATION CHARGE

## 2024-02-27 PROCEDURE — 97811 ACUP 1/> W/O ESTIM EA ADD 15: CPT | Performed by: ACUPUNCTURIST

## 2024-02-27 PROCEDURE — 97810 ACUP 1/> WO ESTIM 1ST 15 MIN: CPT | Performed by: ACUPUNCTURIST

## 2024-02-27 ASSESSMENT — ENCOUNTER SYMPTOMS
NAUSEA: 1
HEADACHES: 1
VOMITING: 1
NECK PAIN: 1

## 2024-02-27 NOTE — PROGRESS NOTES
"Acupuncture Visit:     Please note: Dictation software was used while completing this note and may contain spelling, grammatical, and/or syntax errors.  Please contact me with any questions.    To clinicians, I am always happy to partner with you in the care of your patients. Do not hesitate to call the office or contact me directly regarding any further consultations or with questions regarding the plan of care outlined or patient progress.    Subjective   Patient ID: Caprice Cowart is a 44 y.o. female who presents for Migraine and Neck Pain    Insurance visit 2 of 20    Patient stated that she has had a stressful 2 days, anticipating some stressful situations at work; she noted that her sleep was very poor last night as a result    She had 1 headache approximately 1.5 weeks ago, but \"caught it right away\"; she reported good energy for about the first week after her initial treatment, but she has begun to feel increasingly fatigued since      Initial intake (02/14/2024 )    Pt came in today seeking treatment for chronic neck pain and migraine; she was referred by Isabel Padilla PA-C ([facility] [place])    Patient stated that she has had migraines since her early 20s, and they became worse after her hysterectomy; she has not had any migraines since Christmas, taking both Ubrelvy and Topamax; her migraines are generally bilaterally temporal, lasting hours to days, accompanied by noise sensitivity, nausea, and vomiting; patient also gets tension headaches from neck pain    Patient had a hysterectomy 4/2023 due to stage IV endometriosis, noting that she does not feel like she has \"bounced back\" like she expected; she is also experiencing fatigue, noting that she is recovering from COVID    Migraine   This is a chronic problem. The problem has been rapidly improving. The pain is located in the Temporal region. Associated symptoms include nausea, neck pain, phonophobia and vomiting.   Neck Pain   This is a chronic problem. " Associated symptoms include headaches.       Session Information  Is this acupuncture treatment being billed to the patient's insurance company: Yes  This is visit number: 2  The patient has a total number of visits of: 20  Initial Acupuncture Treatment date: 02/14/24  Last Treatment date: 02/14/24  Name of Insurance Company:  Employee Medical Plan  Visit Type: Follow-up visit  Medical History Reviewed: I have reviewed pertinent medical history in EHR, and no contraindications are present to provide treatment         Review of Systems   Gastrointestinal:  Positive for nausea and vomiting.   Musculoskeletal:  Positive for neck pain.   Neurological:  Positive for headaches.            Provider reviewed plan for the acupuncture session, precautions and contraindications. Patient/guardian/hospital staff has given consent to treat with full understanding of what to expect during the session. Before acupuncture began, provider explained to the patient to communicate at any time if the procedure was causing discomfort past their tolerance level. Patient agreed to advise acupuncturist. The acupuncturist counseled the patient on the risks of acupuncture treatment including pain, infection, bleeding, and no relief of pain. The patient was positioned comfortably. There was no evidence of infection at the site of needle insertions.    Objective   Physical Exam         Treatment Plan  Treatment Goals: Pain management, Wellbeing improvement    Acupuncture Treatment  Patient Position: Supine on a table  Needle Guage: 40 guage /.16/ Red seirin  Body Points - Bilateral: KD7, KD10, ST Qi line x3, LIV3, (YT, DU24)  Other Techniques Utilized: TDP Lamp  TDP Lamp Descripton: Feet, 25min  Needle Count In: 14  Needle Count Out: 14  Needle Retention Time (min): 25 minutes  Total Face to Face Time (min): 20 minutes              Assessment/Plan

## 2024-02-28 ENCOUNTER — PHARMACY VISIT (OUTPATIENT)
Dept: PHARMACY | Facility: CLINIC | Age: 44
End: 2024-02-28
Payer: COMMERCIAL

## 2024-03-05 DIAGNOSIS — E11.9 NEW ONSET TYPE 2 DIABETES MELLITUS (MULTI): ICD-10-CM

## 2024-03-05 PROCEDURE — RXMED WILLOW AMBULATORY MEDICATION CHARGE

## 2024-03-05 RX ORDER — SEMAGLUTIDE 0.68 MG/ML
0.25 INJECTION, SOLUTION SUBCUTANEOUS
Qty: 3 ML | Refills: 0 | Status: SHIPPED | OUTPATIENT
Start: 2024-03-05 | End: 2024-04-20 | Stop reason: SDUPTHER

## 2024-03-08 ENCOUNTER — PHARMACY VISIT (OUTPATIENT)
Dept: PHARMACY | Facility: CLINIC | Age: 44
End: 2024-03-08
Payer: COMMERCIAL

## 2024-03-12 ENCOUNTER — APPOINTMENT (OUTPATIENT)
Dept: INTEGRATIVE MEDICINE | Facility: CLINIC | Age: 44
End: 2024-03-12
Payer: COMMERCIAL

## 2024-03-25 DIAGNOSIS — E78.1 HIGH TRIGLYCERIDES: ICD-10-CM

## 2024-03-25 PROCEDURE — RXMED WILLOW AMBULATORY MEDICATION CHARGE

## 2024-03-26 ENCOUNTER — ALLIED HEALTH (OUTPATIENT)
Dept: INTEGRATIVE MEDICINE | Facility: CLINIC | Age: 44
End: 2024-03-26
Payer: COMMERCIAL

## 2024-03-26 DIAGNOSIS — G89.29 CHRONIC NECK PAIN: Primary | ICD-10-CM

## 2024-03-26 DIAGNOSIS — G43.009 MIGRAINE WITHOUT AURA AND WITHOUT STATUS MIGRAINOSUS, NOT INTRACTABLE: ICD-10-CM

## 2024-03-26 DIAGNOSIS — M54.2 CHRONIC NECK PAIN: Primary | ICD-10-CM

## 2024-03-26 PROCEDURE — 97810 ACUP 1/> WO ESTIM 1ST 15 MIN: CPT | Performed by: ACUPUNCTURIST

## 2024-03-26 PROCEDURE — 97811 ACUP 1/> W/O ESTIM EA ADD 15: CPT | Performed by: ACUPUNCTURIST

## 2024-03-26 ASSESSMENT — ENCOUNTER SYMPTOMS
VOMITING: 1
HEADACHES: 1
NAUSEA: 1
NECK PAIN: 1

## 2024-03-26 NOTE — PROGRESS NOTES
"Acupuncture Visit:     Please note: Dictation software was used while completing this note and may contain spelling, grammatical, and/or syntax errors.  Please contact me with any questions.    To clinicians, I am always happy to partner with you in the care of your patients. Do not hesitate to call the office or contact me directly regarding any further consultations or with questions regarding the plan of care outlined or patient progress.    Subjective   Patient ID: Caprice Cowart is a 44 y.o. female who presents for Migraine and Neck Pain    Insurance visit 3 of 20    Patient stated that her neck pain \"depends on the day\", but overall she is noted \"slight improvement\"    Patient also reports no migraines since her last visit    She has noticed an increase in fatigue, noting that she was very sleepy over the weekend; she also describes \"normal stresses\"; if her stress and anxiety levels are high, patient does experience palpitations, migraines, as well as excessive fatigue if the stress or situation is overwhelming      Initial intake (02/14/2024 )    Pt came in today seeking treatment for chronic neck pain and migraine; she was referred by Isabel Padilla PA-C ([facility] [place]    Patient stated that she has had migraines since her early 20s, and they became worse after her hysterectomy; she has not had any migraines since Christmas, taking both Ubrelvy and Topamax; her migraines are generally bilaterally temporal, lasting hours to days, accompanied by noise sensitivity, nausea, and vomiting; patient also gets tension headaches from neck pain    Patient had a hysterectomy 4/2023 due to stage IV endometriosis, noting that she does not feel like she has \"bounced back\" like she expected; she is also experiencing fatigue, noting that she is recovering from COVID    Migraine   This is a chronic problem. The problem has been rapidly improving. The pain is located in the Temporal region. Associated symptoms include nausea, " neck pain, phonophobia and vomiting.   Neck Pain   This is a chronic problem. Associated symptoms include headaches.       Session Information  Is this acupuncture treatment being billed to the patient's insurance company: Yes  This is visit number: 3  The patient has a total number of visits of: 20  Initial Acupuncture Treatment date: 02/14/24  Last Treatment date: 02/27/24  Name of Insurance Company:  Employee Medical Plan  Visit Type: Follow-up visit  Medical History Reviewed: I have reviewed pertinent medical history in EHR, and no contraindications are present to provide treatment         Review of Systems   Gastrointestinal:  Positive for nausea and vomiting.   Musculoskeletal:  Positive for neck pain.   Neurological:  Positive for headaches.            Provider reviewed plan for the acupuncture session, precautions and contraindications. Patient/guardian/hospital staff has given consent to treat with full understanding of what to expect during the session. Before acupuncture began, provider explained to the patient to communicate at any time if the procedure was causing discomfort past their tolerance level. Patient agreed to advise acupuncturist. The acupuncturist counseled the patient on the risks of acupuncture treatment including pain, infection, bleeding, and no relief of pain. The patient was positioned comfortably. There was no evidence of infection at the site of needle insertions.    Objective   Physical Exam         Treatment Plan  Treatment Goals: Pain management, Wellbeing improvement, Stress reduction    Acupuncture Treatment  Patient Position: Prone on a table  Needle Guage: 36 guage /.20/ Blue seirin, 42 guage /.14/ Lime green seirin  Body Points - Bilateral: KD7, BL23, BL52, BL20, BL14, BL43, HT7, GB21  Other Techniques Utilized: TDP Lamp, Topicals  Topicals Description: Posumon oil  TDP Lamp Descripton: Mid-back, 25min  Needle Count In: 16  Needle Count Out: 16  Needle Retention Time (min):  25 minutes  Total Face to Face Time (min): 20 minutes              Assessment/Plan

## 2024-03-27 ENCOUNTER — PHARMACY VISIT (OUTPATIENT)
Dept: PHARMACY | Facility: CLINIC | Age: 44
End: 2024-03-27
Payer: COMMERCIAL

## 2024-03-28 RX ORDER — ATORVASTATIN CALCIUM 20 MG/1
20 TABLET, FILM COATED ORAL DAILY
Qty: 90 TABLET | Refills: 3 | Status: SHIPPED | OUTPATIENT
Start: 2024-03-28 | End: 2024-05-16 | Stop reason: SDUPTHER

## 2024-04-04 ENCOUNTER — LAB (OUTPATIENT)
Dept: LAB | Facility: LAB | Age: 44
End: 2024-04-04
Payer: COMMERCIAL

## 2024-04-04 DIAGNOSIS — R53.83 FATIGUE, UNSPECIFIED TYPE: ICD-10-CM

## 2024-04-04 DIAGNOSIS — E61.1 LOW IRON: ICD-10-CM

## 2024-04-04 DIAGNOSIS — E55.9 VITAMIN D DEFICIENCY: ICD-10-CM

## 2024-04-04 LAB
25(OH)D3 SERPL-MCNC: 62 NG/ML (ref 30–100)
BASOPHILS # BLD AUTO: 0.09 X10*3/UL (ref 0–0.1)
BASOPHILS NFR BLD AUTO: 0.8 %
EOSINOPHIL # BLD AUTO: 0.12 X10*3/UL (ref 0–0.7)
EOSINOPHIL NFR BLD AUTO: 1 %
ERYTHROCYTE [DISTWIDTH] IN BLOOD BY AUTOMATED COUNT: 13.7 % (ref 11.5–14.5)
FERRITIN SERPL-MCNC: 66 NG/ML (ref 8–150)
FOLATE SERPL-MCNC: >24 NG/ML
HCT VFR BLD AUTO: 40.1 % (ref 36–46)
HGB BLD-MCNC: 12.8 G/DL (ref 12–16)
IMM GRANULOCYTES # BLD AUTO: 0.03 X10*3/UL (ref 0–0.7)
IMM GRANULOCYTES NFR BLD AUTO: 0.3 % (ref 0–0.9)
LYMPHOCYTES # BLD AUTO: 3.78 X10*3/UL (ref 1.2–4.8)
LYMPHOCYTES NFR BLD AUTO: 32.4 %
MCH RBC QN AUTO: 27.9 PG (ref 26–34)
MCHC RBC AUTO-ENTMCNC: 31.9 G/DL (ref 32–36)
MCV RBC AUTO: 87 FL (ref 80–100)
MONOCYTES # BLD AUTO: 0.82 X10*3/UL (ref 0.1–1)
MONOCYTES NFR BLD AUTO: 7 %
NEUTROPHILS # BLD AUTO: 6.84 X10*3/UL (ref 1.2–7.7)
NEUTROPHILS NFR BLD AUTO: 58.5 %
NRBC BLD-RTO: 0 /100 WBCS (ref 0–0)
PLATELET # BLD AUTO: 409 X10*3/UL (ref 150–450)
RBC # BLD AUTO: 4.59 X10*6/UL (ref 4–5.2)
VIT B12 SERPL-MCNC: 605 PG/ML (ref 211–911)
WBC # BLD AUTO: 11.7 X10*3/UL (ref 4.4–11.3)

## 2024-04-04 PROCEDURE — 82728 ASSAY OF FERRITIN: CPT

## 2024-04-04 PROCEDURE — 82306 VITAMIN D 25 HYDROXY: CPT

## 2024-04-04 PROCEDURE — 85025 COMPLETE CBC W/AUTO DIFF WBC: CPT

## 2024-04-04 PROCEDURE — 82746 ASSAY OF FOLIC ACID SERUM: CPT

## 2024-04-04 PROCEDURE — 82607 VITAMIN B-12: CPT

## 2024-04-04 PROCEDURE — 36415 COLL VENOUS BLD VENIPUNCTURE: CPT

## 2024-04-05 ENCOUNTER — APPOINTMENT (OUTPATIENT)
Dept: PRIMARY CARE | Facility: CLINIC | Age: 44
End: 2024-04-05
Payer: COMMERCIAL

## 2024-04-05 ENCOUNTER — ALLIED HEALTH (OUTPATIENT)
Dept: INTEGRATIVE MEDICINE | Facility: CLINIC | Age: 44
End: 2024-04-05
Payer: COMMERCIAL

## 2024-04-05 VITALS
WEIGHT: 202 LBS | DIASTOLIC BLOOD PRESSURE: 74 MMHG | BODY MASS INDEX: 32.12 KG/M2 | HEART RATE: 82 BPM | SYSTOLIC BLOOD PRESSURE: 110 MMHG

## 2024-04-05 DIAGNOSIS — Z71.3 NUTRITIONAL COUNSELING: ICD-10-CM

## 2024-04-05 DIAGNOSIS — Z71.82 EXERCISE COUNSELING: ICD-10-CM

## 2024-04-05 DIAGNOSIS — R53.83 FATIGUE, UNSPECIFIED TYPE: Primary | ICD-10-CM

## 2024-04-05 PROCEDURE — 99215 OFFICE O/P EST HI 40 MIN: CPT | Performed by: PHYSICIAN ASSISTANT

## 2024-04-05 NOTE — PATIENT INSTRUCTIONS
Keep up the good work with sleep optimization, walking, and stress management!  Stress/Sleep:  Reduce screen time  Keep phone out of the bedroom  Put limits on screen time through “Settings”  Use an jose like “One Sec” which encourages mindful use of the phone.  If you wake up and cannot fall back asleep within 10-15 minutes, get out of bed and do something boring/tiring until you become sleepy.   Avoid all light exposure. Light stimulates the stress hormone, cortisol, and it inhibits the sleep hormone, Melatonin.  Use the 5 Senses grounding technique.  Picture favorite place in nature, and name:  5 things you see  4 things you hear  3 things you smell  2 things you feel  1 thing you taste  Physical activity:  Continue with walking. Track steps if this is helpful for you.  Add in strength training:  Building muscle improves insulin sensitivity, metabolic function, and improves bone health. Muscle loss is a possible side effect of Ozempic, so focusing on this is vital.   15-20 minutes, 3-4 times per week, after dinner  Body weight- yoga, pilates  Resistance bands  Free weights  See handout for more information   Nutrition:  Work on reducing/eliminating ultra-processed foods and fluids. These contain chemicals, plastics, emulsifiers, preservatives, etc. that may cause weight loss resistance, inflammation, microbiome disruption, hormone changes and more.   Eat organic as often as possible  See EWG.org for clean eating guides  Drink plenty of filtered water  Add electrolytes 2-3 times per week  Brands: LMNT, Nuun, Ultima  Aim for 25-35 grams of protein at each meal   High quality poultry, grass-fed beef, eggs  Wild caught sockeye salmon, sardines, mackerel  Beans, lentils, tofu, nuts, seeds, nut butter  Protein powder brands: Orgain, Owyn  Aim for 25 to 30 grams fiber per day, with 6 to 8 grams from soluble fiber.   Include a wide variety of vegetables, beans, lentils, berries, collette seeds, and flax seeds.  Soluble fiber  sources: black beans, kidney beans, [place] sprouts, sweet potatoes, broccoli, turnips, carrots, avocado, pears, apricots, nectarines, apples, flax seeds, sunflower seeds, hazelnuts, oats.  Handouts:  7 day meal plan  Gut health to whole health

## 2024-04-05 NOTE — PROGRESS NOTES
Integrative Medicine Consult:  45 y/o female with PMH DMII, HTN, seasonal allergies, vit D deficiency, GERD, endometriosis, anxiety, depression, migraine, eczema presents for follow up regarding Chronic Fatigue. Referred by ID specialist, Dr. Elizabeth.   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 Lives with .     Successes:  - Has had a few acupuncture appts- really enjoys this!- noticing energy improvement, less migraine frequency, less tension in body  - Started magnesium glycinate 200 mg at bedtime- help with sleep initiation, waking up occ, but feels more rested   - Sleep hygiene- baths, stretching  - Stress- breathwork at bedtime occ  - Movement- increasing movement throughout the day, more steps-- takes a walk around hospital with coworkers during breaks  - On Ozempic, lost 14# since last 1/2024-- GI symptoms initially tough (belching, Bms), better now  - CURRENT GOALS-Would like to increase exercise- not sure where to begin; also dietary plan     Challenges:  - Covid infection early Feb- first occurrence- fever, fatigue, poor appetite, dry cough. Used OTC mucinex. No other treatment.   - Mind/body restlessness impact on sleep--notices this is present with drinking energy drinks - not into journaling  - Feels deconditioned with exercise, fatigue and SOB with prolonged activity; no red flag s/s       Physical Activity:  Stretching, yoga occasionally  Slightly more sedentary at work compared to previous position   Increased walking during workweek   Home exercise equipment- weighted ball, bands, yoga mat, block      Nutrition: has used [employer] for counting calories. Strong family hx CAD, elevated TG, so started on statin therapy.  - 8-10 hour fast overnight due to late dinners  Breakfast- protein shake, oatmeal with berries and nuts  Lunch- premade or cafeteria soup, sandwich, salad  Dinner- 5-7pm; pizza, pasta, protein/potato/green beans  Snacks-pretzels, sweets, candy bar  Drinks- as above, rare  smoothie      Meds/Supplements:  Vitamin D3   MVI   Collagen  Electrolytes  Magnesium glycinate     Assessment/Plan:  Rhodiola?-- interactions with prescribed meds, so will hold off   Exercise- PM, 3-4x/week, after dinner   Screen reduction- one sec jose   7 day meal plan  SEE PATIENT INSTRUCTIONS        Next visits:  Discusseed coq10 for statin use  Review exercise, sleep, stress      ROS:  Constitutional: afebrile  Respiratory: no shortness of breath  Cardiovascular: no chest  pain  Abdominal: no abdominal pain, no n/v/d  Musculoskeletal: no joint pain or swelling   Skin: no rash    Physical Exam:  General: alert and oriented; well-developed, well-nourished, no acute distress  HEENT: normocephalic, atraumatic, good eye contact  Respiratory: no labored breathing, no conversational dyspnea  Musculoskeletal: moving all extremities appropriately  Neurology: normal gait  Psych: pleasant affect, cooperative

## 2024-04-08 PROCEDURE — RXMED WILLOW AMBULATORY MEDICATION CHARGE

## 2024-04-09 ENCOUNTER — ALLIED HEALTH (OUTPATIENT)
Dept: INTEGRATIVE MEDICINE | Facility: CLINIC | Age: 44
End: 2024-04-09
Payer: COMMERCIAL

## 2024-04-09 DIAGNOSIS — G89.29 CHRONIC NECK PAIN: Primary | ICD-10-CM

## 2024-04-09 DIAGNOSIS — M54.2 CHRONIC NECK PAIN: Primary | ICD-10-CM

## 2024-04-09 DIAGNOSIS — G43.009 MIGRAINE WITHOUT AURA AND WITHOUT STATUS MIGRAINOSUS, NOT INTRACTABLE: ICD-10-CM

## 2024-04-09 PROCEDURE — 97810 ACUP 1/> WO ESTIM 1ST 15 MIN: CPT | Performed by: ACUPUNCTURIST

## 2024-04-09 PROCEDURE — 97811 ACUP 1/> W/O ESTIM EA ADD 15: CPT | Performed by: ACUPUNCTURIST

## 2024-04-09 ASSESSMENT — ENCOUNTER SYMPTOMS
NAUSEA: 1
VOMITING: 1
HEADACHES: 1
NECK PAIN: 1

## 2024-04-09 NOTE — PROGRESS NOTES
"Acupuncture Visit:     Please note: Dictation software was used while completing this note and may contain spelling, grammatical, and/or syntax errors.  Please contact me with any questions.    To clinicians, I am always happy to partner with you in the care of your patients. Do not hesitate to call the office or contact me directly regarding any further consultations or with questions regarding the plan of care outlined or patient progress.    Subjective   Patient ID: Caprice Cowart is a 44 y.o. female who presents for Neck Pain    Insurance visit 4 of 20    Patient stated that her neck is currently sore and she is experiencing \"lots of tension\"; she had a \"big migraine\" last Wednesday where she slept for approximately 20 hours      Initial intake (02/14/2024 )    Pt came in today seeking treatment for chronic neck pain and migraine; she was referred by Isabel Padilla PA-C [place] [place]    Patient stated that she has had migraines since her early 20s, and they became worse after her hysterectomy; she has not had any migraines since Christmas, taking both Ubrelvy and Topamax; her migraines are generally bilaterally temporal, lasting hours to days, accompanied by noise sensitivity, nausea, and vomiting; patient also gets tension headaches from neck pain    Patient had a hysterectomy 4/2023 due to stage IV endometriosis, noting that she does not feel like she has \"bounced back\" like she expected; she is also experiencing fatigue, noting that she is recovering from COVID    Migraine   This is a chronic problem. The problem has been waxing and waning. The pain is located in the Temporal region. Associated symptoms include nausea, neck pain, phonophobia and vomiting.   Neck Pain   This is a chronic problem. Associated symptoms include headaches.       Session Information  Is this acupuncture treatment being billed to the patient's insurance company: Yes  This is visit number: 4  The patient has a total number of visits of: " 20  Initial Acupuncture Treatment date: 02/14/24  Last Treatment date: 03/26/24  Name of Insurance Company:  Employee Medical Plan  Visit Type: Follow-up visit  Medical History Reviewed: I have reviewed pertinent medical history in EHR, and no contraindications are present to provide treatment         Review of Systems   Gastrointestinal:  Positive for nausea and vomiting.   Musculoskeletal:  Positive for neck pain.   Neurological:  Positive for headaches.            Provider reviewed plan for the acupuncture session, precautions and contraindications. Patient/guardian/hospital staff has given consent to treat with full understanding of what to expect during the session. Before acupuncture began, provider explained to the patient to communicate at any time if the procedure was causing discomfort past their tolerance level. Patient agreed to advise acupuncturist. The acupuncturist counseled the patient on the risks of acupuncture treatment including pain, infection, bleeding, and no relief of pain. The patient was positioned comfortably. There was no evidence of infection at the site of needle insertions.    Objective   Physical Exam         Treatment Plan  Treatment Goals: Pain management, Wellbeing improvement    Acupuncture Treatment  Patient Position: Prone on a table  Needle Guage: 36 guage /.20/ Blue seirin, 42 guage /.14/ Lime green seirin  Body Points - Bilateral: KD7, BL23, BL52, BL20, BL14, suboccipitals, upper trap, levator  Other Techniques Utilized: TDP Lamp, Topicals  Topicals Description: Posumon oil  TDP Lamp Descripton: Mid-back, 25min  Needle Count In: 16  Needle Count Out: 16  Needle Retention Time (min): 25 minutes  Total Face to Face Time (min): 20 minutes              Assessment/Plan

## 2024-04-11 ENCOUNTER — PHARMACY VISIT (OUTPATIENT)
Dept: PHARMACY | Facility: CLINIC | Age: 44
End: 2024-04-11
Payer: COMMERCIAL

## 2024-04-20 DIAGNOSIS — E11.9 NEW ONSET TYPE 2 DIABETES MELLITUS (MULTI): ICD-10-CM

## 2024-04-22 ENCOUNTER — ALLIED HEALTH (OUTPATIENT)
Dept: INTEGRATIVE MEDICINE | Facility: CLINIC | Age: 44
End: 2024-04-22
Payer: COMMERCIAL

## 2024-04-22 DIAGNOSIS — G89.29 CHRONIC NECK PAIN: Primary | ICD-10-CM

## 2024-04-22 DIAGNOSIS — M54.2 CHRONIC NECK PAIN: Primary | ICD-10-CM

## 2024-04-22 DIAGNOSIS — G43.009 MIGRAINE WITHOUT AURA AND WITHOUT STATUS MIGRAINOSUS, NOT INTRACTABLE: ICD-10-CM

## 2024-04-22 PROCEDURE — 97810 ACUP 1/> WO ESTIM 1ST 15 MIN: CPT | Performed by: ACUPUNCTURIST

## 2024-04-22 PROCEDURE — 97811 ACUP 1/> W/O ESTIM EA ADD 15: CPT | Performed by: ACUPUNCTURIST

## 2024-04-22 ASSESSMENT — ENCOUNTER SYMPTOMS
VOMITING: 1
NECK PAIN: 1
NAUSEA: 1
HEADACHES: 1

## 2024-04-22 NOTE — PROGRESS NOTES
"Acupuncture Visit:     Please note: Dictation software was used while completing this note and may contain spelling, grammatical, and/or syntax errors.  Please contact me with any questions.    To clinicians, I am always happy to partner with you in the care of your patients. Do not hesitate to call the office or contact me directly regarding any further consultations or with questions regarding the plan of care outlined or patient progress.    Subjective   Patient ID: Caprice Cowart is a 44 y.o. female who presents for Neck Pain    Insurance visit 5 of 20    Patient stated that she has been feeling tension on the right side of her neck since Tuesday, moving into her shoulder and upper back; she also notes limited range of motion    Patient mentioned that her stress level is quite high at the moment      Initial intake (02/14/2024 )    Pt came in today seeking treatment for chronic neck pain and migraine; she was referred by Isabel Padilla PA-C ([facility] [place])    Patient stated that she has had migraines since her early 20s, and they became worse after her hysterectomy; she has not had any migraines since Christmas, taking both Ubrelvy and Topamax; her migraines are generally bilaterally temporal, lasting hours to days, accompanied by noise sensitivity, nausea, and vomiting; patient also gets tension headaches from neck pain    Patient had a hysterectomy 4/2023 due to stage IV endometriosis, noting that she does not feel like she has \"bounced back\" like she expected; she is also experiencing fatigue, noting that she is recovering from COVID    Migraine   This is a chronic problem. The problem has been waxing and waning. The pain is located in the Temporal region. Associated symptoms include nausea, neck pain, phonophobia and vomiting.   Neck Pain   This is a chronic problem. Associated symptoms include headaches.       Session Information  Is this acupuncture treatment being billed to the patient's insurance company: " Yes  This is visit number: 5  The patient has a total number of visits of: 20  Last Treatment date: 04/09/24  Name of Insurance Company:  Employee Medical Plan  Visit Type: Follow-up visit  Medical History Reviewed: I have reviewed pertinent medical history in EHR, and no contraindications are present to provide treatment         Review of Systems   Gastrointestinal:  Positive for nausea and vomiting.   Musculoskeletal:  Positive for neck pain.   Neurological:  Positive for headaches.            Provider reviewed plan for the acupuncture session, precautions and contraindications. Patient/guardian/hospital staff has given consent to treat with full understanding of what to expect during the session. Before acupuncture began, provider explained to the patient to communicate at any time if the procedure was causing discomfort past their tolerance level. Patient agreed to advise acupuncturist. The acupuncturist counseled the patient on the risks of acupuncture treatment including pain, infection, bleeding, and no relief of pain. The patient was positioned comfortably. There was no evidence of infection at the site of needle insertions.    Objective   Physical Exam         Treatment Plan  Treatment Goals: Pain management, Wellbeing improvement    Acupuncture Treatment  Patient Position: Prone on a table  Needle Guage: 36 guage /.20/ Blue seirin  Body Points - Bilateral: KD7, BL23, BL52, BL20, BL14, suboccipitals, upper traps, levator  Other Techniques Utilized: TDP Lamp, Topicals  Topicals Description: Posumon oil  TDP Lamp Descripton: Back, 25min  Needle Count In: 16  Needle Count Out: 16  Needle Retention Time (min): 25 minutes  Total Face to Face Time (min): 20 minutes              Assessment/Plan

## 2024-04-23 PROCEDURE — RXMED WILLOW AMBULATORY MEDICATION CHARGE

## 2024-04-23 RX ORDER — SEMAGLUTIDE 0.68 MG/ML
0.25 INJECTION, SOLUTION SUBCUTANEOUS
Qty: 3 ML | Refills: 3 | Status: SHIPPED | OUTPATIENT
Start: 2024-04-28

## 2024-04-24 ENCOUNTER — PHARMACY VISIT (OUTPATIENT)
Dept: PHARMACY | Facility: CLINIC | Age: 44
End: 2024-04-24
Payer: COMMERCIAL

## 2024-05-06 ENCOUNTER — ALLIED HEALTH (OUTPATIENT)
Dept: INTEGRATIVE MEDICINE | Facility: CLINIC | Age: 44
End: 2024-05-06
Payer: COMMERCIAL

## 2024-05-06 DIAGNOSIS — G43.009 MIGRAINE WITHOUT AURA AND WITHOUT STATUS MIGRAINOSUS, NOT INTRACTABLE: ICD-10-CM

## 2024-05-06 DIAGNOSIS — F41.9 ANXIETY: ICD-10-CM

## 2024-05-06 DIAGNOSIS — G89.29 CHRONIC NECK PAIN: Primary | ICD-10-CM

## 2024-05-06 DIAGNOSIS — M54.2 CHRONIC NECK PAIN: Primary | ICD-10-CM

## 2024-05-06 PROCEDURE — 97810 ACUP 1/> WO ESTIM 1ST 15 MIN: CPT | Performed by: ACUPUNCTURIST

## 2024-05-06 PROCEDURE — 97811 ACUP 1/> W/O ESTIM EA ADD 15: CPT | Performed by: ACUPUNCTURIST

## 2024-05-07 ASSESSMENT — ENCOUNTER SYMPTOMS
NAUSEA: 1
HEADACHES: 1
VOMITING: 1
NECK PAIN: 1

## 2024-05-07 NOTE — PROGRESS NOTES
"Acupuncture Visit:     Please note: Dictation software was used while completing this note and may contain spelling, grammatical, and/or syntax errors.  Please contact me with any questions.    To clinicians, I am always happy to partner with you in the care of your patients. Do not hesitate to call the office or contact me directly regarding any further consultations or with questions regarding the plan of care outlined or patient progress.    Subjective   Patient ID: Caprice Cowart is a 44 y.o. female who presents for Neck Pain and Migraine    Insurance visit 6 of 20    Patient stated that she was in bed with a migraine all day on Friday, and still has a residual headache and neck tension    She also noted that her allergies are bad, and this is the \"worst time of year\" for her    Sleep has been difficult, noting that she is \"tired but cannot sleep    Patient continues to experience some stress, though she is mostly anxious workwise waiting to hear about the new position that she has applied for      Initial intake (02/14/2024 )    Pt came in today seeking treatment for chronic neck pain and migraine; she was referred by Isabel Padilla PA-C [place]    Patient stated that she has had migraines since her early 20s, and they became worse after her hysterectomy; she has not had any migraines since Christmas, taking both Ubrelvy and Topamax; her migraines are generally bilaterally temporal, lasting hours to days, accompanied by noise sensitivity, nausea, and vomiting; patient also gets tension headaches from neck pain    Patient had a hysterectomy 4/2023 due to stage IV endometriosis, noting that she does not feel like she has \"bounced back\" like she expected; she is also experiencing fatigue, noting that she is recovering from COVID    Migraine   This is a chronic problem. The problem has been waxing and waning. The pain is located in the Temporal region. Associated symptoms include nausea, neck pain, phonophobia " and vomiting.   Neck Pain   This is a chronic problem. Associated symptoms include headaches.       Session Information  Is this acupuncture treatment being billed to the patient's insurance company: Yes  This is visit number: 6  The patient has a total number of visits of: 20  Last Treatment date: 04/22/24  Name of Insurance Company:  Employee Medical Plan  Visit Type: Follow-up visit  Medical History Reviewed: I have reviewed pertinent medical history in EHR, and no contraindications are present to provide treatment  Description of present complaint: Chronic pain, Myofascial pain, Muscle tension, Headache, Stress, Anxiety         Review of Systems   Gastrointestinal:  Positive for nausea and vomiting.   Musculoskeletal:  Positive for neck pain.   Neurological:  Positive for headaches.            Provider reviewed plan for the acupuncture session, precautions and contraindications. Patient/guardian/hospital staff has given consent to treat with full understanding of what to expect during the session. Before acupuncture began, provider explained to the patient to communicate at any time if the procedure was causing discomfort past their tolerance level. Patient agreed to advise acupuncturist. The acupuncturist counseled the patient on the risks of acupuncture treatment including pain, infection, bleeding, and no relief of pain. The patient was positioned comfortably. There was no evidence of infection at the site of needle insertions.    Objective   Physical Exam         Treatment Plan  Treatment Goals: Pain management, Wellbeing improvement, Stress reduction, Anxiety reduction    Acupuncture Treatment  Patient Position: Prone on a table, Supine on a table  Needle Guage: 40 guage /.16/ Red seirin  Body Points - Bilateral: KD7, LIV3, ST Qi line x3, GB20, (YT, DU24)  Other Techniques Utilized: Topicals, Gua sha  Gua Sha Description: Paraspinals, upper back  Topicals Description: Sports Massage oil, Posumon  oil  Needle Count In: 14  Needle Count Out: 14  Needle Retention Time (min): 20 minutes  Total Face to Face Time (min): 25 minutes              Assessment/Plan

## 2024-05-13 ASSESSMENT — PROMIS GLOBAL HEALTH SCALE
RATE_SOCIAL_SATISFACTION: VERY GOOD
RATE_AVERAGE_FATIGUE: MODERATE
RATE_GENERAL_HEALTH: GOOD
RATE_AVERAGE_PAIN: 4
RATE_PHYSICAL_HEALTH: GOOD
RATE_QUALITY_OF_LIFE: VERY GOOD
RATE_MENTAL_HEALTH: VERY GOOD
CARRYOUT_PHYSICAL_ACTIVITIES: COMPLETELY
CARRYOUT_SOCIAL_ACTIVITIES: VERY GOOD
EMOTIONAL_PROBLEMS: SOMETIMES

## 2024-05-15 ENCOUNTER — LAB (OUTPATIENT)
Dept: LAB | Facility: LAB | Age: 44
End: 2024-05-15
Payer: COMMERCIAL

## 2024-05-15 ENCOUNTER — OFFICE VISIT (OUTPATIENT)
Dept: PRIMARY CARE | Facility: CLINIC | Age: 44
End: 2024-05-15
Payer: COMMERCIAL

## 2024-05-15 VITALS
OXYGEN SATURATION: 97 % | DIASTOLIC BLOOD PRESSURE: 72 MMHG | HEIGHT: 67 IN | TEMPERATURE: 97.3 F | WEIGHT: 203.7 LBS | RESPIRATION RATE: 18 BRPM | HEART RATE: 85 BPM | BODY MASS INDEX: 31.97 KG/M2 | SYSTOLIC BLOOD PRESSURE: 118 MMHG

## 2024-05-15 DIAGNOSIS — E11.9 TYPE 2 DIABETES MELLITUS WITHOUT COMPLICATION, WITHOUT LONG-TERM CURRENT USE OF INSULIN (MULTI): ICD-10-CM

## 2024-05-15 DIAGNOSIS — Z00.00 ANNUAL PHYSICAL EXAM: Primary | ICD-10-CM

## 2024-05-15 DIAGNOSIS — G43.009 MIGRAINE WITHOUT AURA AND WITHOUT STATUS MIGRAINOSUS, NOT INTRACTABLE: ICD-10-CM

## 2024-05-15 DIAGNOSIS — L65.9 HAIR LOSS: ICD-10-CM

## 2024-05-15 DIAGNOSIS — J30.89 NON-SEASONAL ALLERGIC RHINITIS, UNSPECIFIED TRIGGER: ICD-10-CM

## 2024-05-15 DIAGNOSIS — Z00.00 ANNUAL PHYSICAL EXAM: ICD-10-CM

## 2024-05-15 PROBLEM — J30.9 ALLERGIC RHINITIS, UNSPECIFIED: Status: ACTIVE | Noted: 2024-05-15

## 2024-05-15 LAB
CHOLEST SERPL-MCNC: 166 MG/DL (ref 0–199)
CHOLESTEROL/HDL RATIO: 2.6
EST. AVERAGE GLUCOSE BLD GHB EST-MCNC: 148 MG/DL
HBA1C MFR BLD: 6.8 %
HDLC SERPL-MCNC: 63 MG/DL
LDLC SERPL CALC-MCNC: 49 MG/DL
NON HDL CHOLESTEROL: 103 MG/DL (ref 0–149)
TRIGL SERPL-MCNC: 269 MG/DL (ref 0–149)
TSH SERPL-ACNC: 1.8 MIU/L (ref 0.44–3.98)
VLDL: 54 MG/DL (ref 0–40)

## 2024-05-15 PROCEDURE — 3061F NEG MICROALBUMINURIA REV: CPT | Performed by: FAMILY MEDICINE

## 2024-05-15 PROCEDURE — RXMED WILLOW AMBULATORY MEDICATION CHARGE

## 2024-05-15 PROCEDURE — 3048F LDL-C <100 MG/DL: CPT | Performed by: FAMILY MEDICINE

## 2024-05-15 PROCEDURE — 99396 PREV VISIT EST AGE 40-64: CPT | Performed by: FAMILY MEDICINE

## 2024-05-15 PROCEDURE — 3074F SYST BP LT 130 MM HG: CPT | Performed by: FAMILY MEDICINE

## 2024-05-15 PROCEDURE — 3078F DIAST BP <80 MM HG: CPT | Performed by: FAMILY MEDICINE

## 2024-05-15 PROCEDURE — 80061 LIPID PANEL: CPT

## 2024-05-15 PROCEDURE — 3044F HG A1C LEVEL LT 7.0%: CPT | Performed by: FAMILY MEDICINE

## 2024-05-15 PROCEDURE — 84443 ASSAY THYROID STIM HORMONE: CPT

## 2024-05-15 PROCEDURE — 83036 HEMOGLOBIN GLYCOSYLATED A1C: CPT

## 2024-05-15 PROCEDURE — 1036F TOBACCO NON-USER: CPT | Performed by: FAMILY MEDICINE

## 2024-05-15 PROCEDURE — 36415 COLL VENOUS BLD VENIPUNCTURE: CPT

## 2024-05-15 RX ORDER — BENZOCAINE .13; .15; .5; 2 G/100G; G/100G; G/100G; G/100G
1 GEL ORAL DAILY
Qty: 8.43 ML | Refills: 3 | Status: SHIPPED | OUTPATIENT
Start: 2024-05-15 | End: 2025-05-15

## 2024-05-15 ASSESSMENT — ENCOUNTER SYMPTOMS
WHEEZING: 0
RHINORRHEA: 1
HEMATURIA: 0
NERVOUS/ANXIOUS: 1
DIARRHEA: 0
DYSPHORIC MOOD: 1
PALPITATIONS: 0
SORE THROAT: 0
MYALGIAS: 0
COUGH: 0
HEADACHES: 1
DIFFICULTY URINATING: 0
BRUISES/BLEEDS EASILY: 0
SHORTNESS OF BREATH: 0
CONSTIPATION: 1
FEVER: 0
POLYDIPSIA: 0
ARTHRALGIAS: 1
BLOOD IN STOOL: 0
FATIGUE: 1
WEAKNESS: 0
NAUSEA: 0
VOMITING: 0

## 2024-05-15 NOTE — ASSESSMENT & PLAN NOTE
Pt currently on fexofenadine, montelukast for allergies  Would like to add a NS.has been on all NS in the past  Will try rhinocort, use as directed

## 2024-05-15 NOTE — ASSESSMENT & PLAN NOTE
Pt has been stable on medication, tolerates  meds  Will order hba1c  Will make rec based on results  F/up 3 mo

## 2024-05-15 NOTE — ASSESSMENT & PLAN NOTE
Pt has sens to tryptans, is currently on metoprolol, topiramate and ubrogepant/ubrelvy  Would rec avoiding ergotamines  We may try nurtec, or injectables , aimovig, ajovy or emgaly or pt may be referred to neuro  Pt will decide and let me know  Addendum  Pt will try nurtec and stop ubrelvy and topiramate

## 2024-05-15 NOTE — PROGRESS NOTES
"Subjective   Patient ID: Caprice Cowart is a 44 y.o. female who presents for Annual Exam (Concerns include : an increase in migraines, this has been on ongoing issue and has worsened. Patient also has a new issue of hair falling out. ).    HPI     Review of Systems   Constitutional:  Positive for fatigue. Negative for fever.   HENT:  Positive for postnasal drip and rhinorrhea. Negative for congestion, ear pain, hearing loss and sore throat.    Eyes:  Negative for visual disturbance.   Respiratory:  Negative for cough, shortness of breath and wheezing.    Cardiovascular:  Negative for chest pain and palpitations.   Gastrointestinal:  Positive for constipation. Negative for blood in stool, diarrhea, nausea and vomiting.        Chronic for pt. Related to meds, is taking gummy fiber   Endocrine: Negative for cold intolerance, heat intolerance, polydipsia and polyuria.   Genitourinary:  Negative for difficulty urinating, hematuria, vaginal bleeding and vaginal discharge.   Musculoskeletal:  Positive for arthralgias. Negative for myalgias.        Elbow and knee jt pain intermittenly   Skin:  Negative for rash.        Having hair loss, had covid in February  No change in diet, no rash on scalp, no new hair products. No new stressors.   Only new med is mg   Neurological:  Positive for headaches. Negative for weakness.        Migraines worsening over the last month.had 3-4 episodes.   Hematological:  Does not bruise/bleed easily.   Psychiatric/Behavioral:  Positive for dysphoric mood. Negative for suicidal ideas. The patient is nervous/anxious.        Objective   /72   Pulse 85   Temp 36.3 °C (97.3 °F)   Resp 18   Ht 1.695 m (5' 6.75\")   Wt 92.4 kg (203 lb 11.2 oz)   LMP 03/16/2023   SpO2 97%   BMI 32.14 kg/m²     Physical Exam  Vitals and nursing note reviewed.   Constitutional:       General: She is not in acute distress.     Appearance: Normal appearance.   HENT:      Head: Normocephalic and atraumatic. "      Right Ear: Tympanic membrane and external ear normal.      Left Ear: Tympanic membrane, ear canal and external ear normal.      Nose: Congestion present.      Mouth/Throat:      Mouth: Mucous membranes are moist.      Pharynx: Oropharynx is clear.   Eyes:      Extraocular Movements: Extraocular movements intact.      Conjunctiva/sclera: Conjunctivae normal.      Pupils: Pupils are equal, round, and reactive to light.   Cardiovascular:      Rate and Rhythm: Normal rate and regular rhythm.      Heart sounds: Normal heart sounds.   Pulmonary:      Effort: Pulmonary effort is normal.      Breath sounds: Normal breath sounds.   Abdominal:      General: Abdomen is flat. Bowel sounds are normal.      Palpations: Abdomen is soft.   Musculoskeletal:         General: No deformity.      Cervical back: Neck supple.   Lymphadenopathy:      Cervical: No cervical adenopathy.   Skin:     General: Skin is warm and dry.      Comments: Scalp without patchy alopecia, no skin rash, no scaling. There is mild hair shaft breaking   Neurological:      General: No focal deficit present.      Mental Status: She is alert.      Sensory: No sensory deficit.      Motor: No weakness.      Gait: Gait normal.   Psychiatric:         Mood and Affect: Mood normal.         Behavior: Behavior normal.         Assessment/Plan   Problem List Items Addressed This Visit             ICD-10-CM    Migraine without aura and without status migrainosus, not intractable G43.009     Pt has sens to tryptans, is currently on metoprolol, topiramate and ubrogepant/ubrelvy  Would rec avoiding ergotamines  We may try nurtec, or injectables , aimovig, ajovy or emgaly or pt may be referred to neuro  Pt will decide and let me know  Addendum  Pt will try nurtec and stop ubrelvy and topiramate         Relevant Medications    rimegepant (NURTEC) 75 mg tablet,disintegrating    Diabetes mellitus (Multi) E11.9     Pt has been stable on medication, tolerates  meds  Will order  hba1c  Will make rec based on results  F/up 3 mo         Relevant Orders    Hemoglobin A1C (Completed)    Follow Up In [facility] - PCP - Established    Hair loss L65.9     Will check labs  Pt to cont prenatal vits including vit b, d and e and biotin  May try otc womens rogaine to help prevent further loss, use good quality shampoo to prevent hair loss  Avoid curling irons ,flat irons and over drying hair, no harsh chemicals  May need referral to derm if persists or worsens         Relevant Orders    TSH with reflex to Free T4 if abnormal (Completed)    Annual physical exam - Primary Z00.00    Relevant Orders    Lipid Panel (Completed)    BI mammo bilateral screening tomosynthesis    Allergic rhinitis, unspecified J30.9     Pt currently on fexofenadine, montelukast for allergies  Would like to add a NS.has been on all NS in the past  Will try rhinocort, use as directed           Relevant Medications    budesonide (Rhinocort AQ) 32 mcg/actuation nasal spray        Patient reported health Good    Regular Dental Visits yes    Regular Eye Visits yes    Hearing Loss no    Balanced Diet yes    Weight Concerns no    Exercise Irregular

## 2024-05-15 NOTE — ASSESSMENT & PLAN NOTE
Will check labs  Pt to cont prenatal vits including vit b, d and e and biotin  May try otc womens rogaine to help prevent further loss, use good quality shampoo to prevent hair loss  Avoid curling irons ,flat irons and over drying hair, no harsh chemicals  May need referral to derm if persists or worsens

## 2024-05-16 ENCOUNTER — TELEPHONE (OUTPATIENT)
Dept: PRIMARY CARE | Facility: CLINIC | Age: 44
End: 2024-05-16
Payer: COMMERCIAL

## 2024-05-16 DIAGNOSIS — E78.1 HIGH TRIGLYCERIDES: ICD-10-CM

## 2024-05-16 PROCEDURE — RXMED WILLOW AMBULATORY MEDICATION CHARGE

## 2024-05-16 RX ORDER — ATORVASTATIN CALCIUM 40 MG/1
40 TABLET, FILM COATED ORAL DAILY
Qty: 90 TABLET | Refills: 0 | Status: SHIPPED | OUTPATIENT
Start: 2024-05-16 | End: 2025-05-11

## 2024-05-16 NOTE — TELEPHONE ENCOUNTER
Nuertec is not covered for Caprice. She called and they wont even cover it with a PA. She wants to know if there is something else she can try.

## 2024-05-16 NOTE — TELEPHONE ENCOUNTER
----- Message from Jacquelyn De Paz MD sent at 5/15/2024  3:18 PM EDT -----  Call nikita, hba1c 6.8 at goal  TG's high, increase atorvastatin to 40 mg /d  Recheck lipids 3 mo with next visit

## 2024-05-17 PROCEDURE — RXMED WILLOW AMBULATORY MEDICATION CHARGE

## 2024-05-20 ENCOUNTER — PHARMACY VISIT (OUTPATIENT)
Dept: PHARMACY | Facility: CLINIC | Age: 44
End: 2024-05-20
Payer: COMMERCIAL

## 2024-05-21 DIAGNOSIS — E11.9 NEW ONSET TYPE 2 DIABETES MELLITUS (MULTI): ICD-10-CM

## 2024-05-22 ENCOUNTER — OFFICE VISIT (OUTPATIENT)
Dept: OBSTETRICS AND GYNECOLOGY | Facility: CLINIC | Age: 44
End: 2024-05-22
Payer: COMMERCIAL

## 2024-05-22 VITALS — BODY MASS INDEX: 32.82 KG/M2 | DIASTOLIC BLOOD PRESSURE: 60 MMHG | WEIGHT: 208 LBS | SYSTOLIC BLOOD PRESSURE: 116 MMHG

## 2024-05-22 DIAGNOSIS — Z00.00 ANNUAL PHYSICAL EXAM: ICD-10-CM

## 2024-05-22 PROCEDURE — 3061F NEG MICROALBUMINURIA REV: CPT | Performed by: OBSTETRICS & GYNECOLOGY

## 2024-05-22 PROCEDURE — 3048F LDL-C <100 MG/DL: CPT | Performed by: OBSTETRICS & GYNECOLOGY

## 2024-05-22 PROCEDURE — 3044F HG A1C LEVEL LT 7.0%: CPT | Performed by: OBSTETRICS & GYNECOLOGY

## 2024-05-22 PROCEDURE — 3078F DIAST BP <80 MM HG: CPT | Performed by: OBSTETRICS & GYNECOLOGY

## 2024-05-22 PROCEDURE — RXMED WILLOW AMBULATORY MEDICATION CHARGE

## 2024-05-22 PROCEDURE — 1036F TOBACCO NON-USER: CPT | Performed by: OBSTETRICS & GYNECOLOGY

## 2024-05-22 PROCEDURE — 99214 OFFICE O/P EST MOD 30 MIN: CPT | Performed by: OBSTETRICS & GYNECOLOGY

## 2024-05-22 PROCEDURE — 3074F SYST BP LT 130 MM HG: CPT | Performed by: OBSTETRICS & GYNECOLOGY

## 2024-05-22 RX ORDER — METFORMIN HYDROCHLORIDE 500 MG/1
1000 TABLET ORAL
Qty: 360 TABLET | Refills: 1 | Status: SHIPPED | OUTPATIENT
Start: 2024-05-22

## 2024-05-22 ASSESSMENT — PATIENT HEALTH QUESTIONNAIRE - PHQ9
SUM OF ALL RESPONSES TO PHQ9 QUESTIONS 1 & 2: 0
2. FEELING DOWN, DEPRESSED OR HOPELESS: NOT AT ALL
1. LITTLE INTEREST OR PLEASURE IN DOING THINGS: NOT AT ALL

## 2024-05-22 NOTE — PROGRESS NOTES
GYN PROGRESS NOTE          Chief complaint: follow up    HPI:  Patient answers are not available for this visit.  HPI       Annual Exam     Additional comments: Est pt   Chaperone mikayla             Comments    Hysterectomy  4/2023 for endometriosis and aub  Has bleeding with intercourse every time            Last edited by Mikayla Law MA on 5/22/2024  8:51 AM.          Reviewed case with patient, reviewed plans.    ROS:  GEN - no fevers or chills  RESP - no SOB or cough  GYN - see HPI      HISTORY:  Past Medical History:   Diagnosis Date    Abnormal Pap smear of cervix     Abnormal uterine bleeding (AUB) 02/07/2023    Acute maxillary sinusitis 02/07/2023    Acute upper respiratory infection, unspecified 11/20/2019    Acute URI    Allergic     Allergy status to unspecified drugs, medicaments and biological substances     History of seasonal allergies    Anemia     Anxiety     Body mass index (BMI) 34.0-34.9, adult 01/08/2021    Body mass index (BMI) of 34.0 to 34.9 in adult    Depression     Diabetes mellitus (Multi)     Eczema     Elevated heart rate with elevated blood pressure without diagnosis of hypertension 02/07/2023    Excessive and frequent menstruation with regular cycle 04/02/2020    Spotting    Female pelvic pain 02/07/2023    Flank pain 02/07/2023    GERD (gastroesophageal reflux disease)     Headache     HPV (human papilloma virus) infection     Hypertension     Menorrhagia with regular cycle 02/07/2023    Other abnormal glucose 01/08/2021    Elevated glucose    Personal history of other diseases of the digestive system     History of irritable bowel syndrome    Personal history of other diseases of the digestive system     History of esophageal reflux    Personal history of other diseases of the nervous system and sense organs     History of migraine    Personal history of other endocrine, nutritional and metabolic disease 07/08/2020    History of obesity    Personal history of other infectious and  parasitic diseases 10/28/2019    History of viral infection    Personal history of other specified conditions 10/28/2019    History of fever    Rash and other nonspecific skin eruption 07/20/2020    Rash    Urinary tract infection, site not specified 05/30/2020    Acute UTI (urinary tract infection)    Visual impairment     Vitamin D deficiency, unspecified     Vitamin D deficiency     Past Surgical History:   Procedure Laterality Date    ADENOIDECTOMY      APPENDECTOMY  8/8/2022    HYSTERECTOMY  4/4/2023    KNEE SURGERY  08/15/2016    Knee Surgery    LAPAROSCOPIC HYSTERECTOMY      WITHOUT BILATERAL OOPHORECTOMY    LAPAROSCOPY DIAGNOSTIC / BIOPSY / ASPIRATION / LYSIS  10/14/2022    Exploratory Laparoscopy    OTHER SURGICAL HISTORY  08/15/2016    Sigmoidoscopy (Fiberoptic, Therapeutic )    OTHER SURGICAL HISTORY  08/23/2022    Laparoscopy    OTHER SURGICAL HISTORY  08/23/2022    Appendectomy laparoscopic    OTHER SURGICAL HISTORY  08/23/2022    Presacral neurectomy    TONSILLECTOMY  08/15/2016    Tonsillectomy     Social History     Socioeconomic History    Marital status:      Spouse name: Not on file    Number of children: Not on file    Years of education: Not on file    Highest education level: Not on file   Occupational History    Not on file   Tobacco Use    Smoking status: Former     Current packs/day: 1.00     Types: Cigarettes     Passive exposure: Past    Smokeless tobacco: Never   Substance and Sexual Activity    Alcohol use: Yes     Comment: Rare    Drug use: Never    Sexual activity: Yes     Partners: Male     Birth control/protection: None     Comment: Hysterectomy 4/4/2023   Other Topics Concern    Not on file   Social History Narrative    Not on file     Social Determinants of Health     Financial Resource Strain: Not on file   Food Insecurity: Not on file   Transportation Needs: Not on file   Physical Activity: Not on file   Stress: Not on file   Social Connections: Not on file   Intimate  Partner Violence: Not on file   Housing Stability: Not on file     Cancer-related family history includes Cancer in her father; Colon cancer in her paternal grandmother.       PHYSICAL EXAM:  /60   Wt 94.3 kg (208 lb)   LMP 03/16/2023   BMI 32.82 kg/m²   Physical examination:  General: No distress  Neck: No masses  Respiratory: No respiratory distress  Abdomen: soft nontender no hernias  GYN: Normal vulvar skin normal clitoral hernandez and clitoris labia majora and minora normal vaginal mucosa no lesions, healthy tissue sensitive with swab at cuff no granuloma no sutures normally healed        IMPRESSION/PLAN:  44-year-old history of endometriosis adenomyosis with severe dysmenorrhea now pain-free    Routine care              Vishnu Bell MD

## 2024-05-24 ENCOUNTER — PHARMACY VISIT (OUTPATIENT)
Dept: PHARMACY | Facility: CLINIC | Age: 44
End: 2024-05-24
Payer: COMMERCIAL

## 2024-05-29 DIAGNOSIS — G43.009 MIGRAINE WITHOUT AURA AND WITHOUT STATUS MIGRAINOSUS, NOT INTRACTABLE: ICD-10-CM

## 2024-05-29 PROCEDURE — RXMED WILLOW AMBULATORY MEDICATION CHARGE

## 2024-05-29 NOTE — TELEPHONE ENCOUNTER
Requested Prescriptions     Pending Prescriptions Disp Refills    rimegepant (NURTEC) 75 mg tablet,disintegrating 16 tablet 0     Sig: Take 1 tablet (75 mg) by mouth every other day.

## 2024-06-01 ENCOUNTER — PHARMACY VISIT (OUTPATIENT)
Dept: PHARMACY | Facility: CLINIC | Age: 44
End: 2024-06-01
Payer: COMMERCIAL

## 2024-06-03 ENCOUNTER — PHARMACY VISIT (OUTPATIENT)
Dept: PHARMACY | Facility: CLINIC | Age: 44
End: 2024-06-03
Payer: COMMERCIAL

## 2024-06-03 PROCEDURE — RXMED WILLOW AMBULATORY MEDICATION CHARGE

## 2024-06-05 ENCOUNTER — ALLIED HEALTH (OUTPATIENT)
Dept: INTEGRATIVE MEDICINE | Facility: CLINIC | Age: 44
End: 2024-06-05
Payer: COMMERCIAL

## 2024-06-05 DIAGNOSIS — M54.59 OTHER LOW BACK PAIN: Primary | ICD-10-CM

## 2024-06-05 DIAGNOSIS — G43.009 MIGRAINE WITHOUT AURA AND WITHOUT STATUS MIGRAINOSUS, NOT INTRACTABLE: ICD-10-CM

## 2024-06-05 PROCEDURE — 97811 ACUP 1/> W/O ESTIM EA ADD 15: CPT | Performed by: ACUPUNCTURIST

## 2024-06-05 PROCEDURE — 97810 ACUP 1/> WO ESTIM 1ST 15 MIN: CPT | Performed by: ACUPUNCTURIST

## 2024-06-05 ASSESSMENT — ENCOUNTER SYMPTOMS
NAUSEA: 1
VOMITING: 1
HEADACHES: 1
NECK PAIN: 1

## 2024-06-05 NOTE — PROGRESS NOTES
"Acupuncture Visit:     Please note: Dictation software was used while completing this note and may contain spelling, grammatical, and/or syntax errors.  Please contact me with any questions.    To clinicians, I am always happy to partner with you in the care of your patients. Do not hesitate to call the office or contact me directly regarding any further consultations or with questions regarding the plan of care outlined or patient progress.    Subjective   Patient ID: Caprice Cowart is a 44 y.o. female who presents for Neck Pain and Migraine    Insurance visit 7 of 20    Patient is currently experiencing a migraine, which began yesterday; she notes an improvement overall in migraine severity and frequency since switching medication to Nurtec preventatively; she reports slight brain fog, body fatigue, and sound sensitivity; she denies neck pain    She also noted that while she is still experiencing stress, it is \"the same stress\", nothing new or different      Initial intake (02/14/2024 )    Pt came in today seeking treatment for chronic neck pain and migraine; she was referred by Isabel Padilla PA-C ([facility] [place]    Patient stated that she has had migraines since her early 20s, and they became worse after her hysterectomy; she has not had any migraines since Christmas, taking both Ubrelvy and Topamax; her migraines are generally bilaterally temporal, lasting hours to days, accompanied by noise sensitivity, nausea, and vomiting; patient also gets tension headaches from neck pain    Patient had a hysterectomy 4/2023 due to stage IV endometriosis, noting that she does not feel like she has \"bounced back\" like she expected; she is also experiencing fatigue, noting that she is recovering from COVID    Migraine   This is a chronic problem. The problem has been gradually improving. The pain is located in the Temporal region. Associated symptoms include nausea, neck pain, phonophobia and vomiting.   Neck Pain   This is a " chronic problem. Associated symptoms include headaches.       Session Information  Is this acupuncture treatment being billed to the patient's insurance company: Yes  This is visit number: 7  The patient has a total number of visits of: 20  Initial Acupuncture Treatment date: 02/14/24  Last Treatment date: 05/06/24  Name of Insurance Company:  Employee Medical Plan  Visit Type: Follow-up visit  Medical History Reviewed: I have reviewed pertinent medical history in EHR, and no contraindications are present to provide treatment         Review of Systems   Gastrointestinal:  Positive for nausea and vomiting.   Musculoskeletal:  Positive for neck pain.   Neurological:  Positive for headaches.            Provider reviewed plan for the acupuncture session, precautions and contraindications. Patient/guardian/hospital staff has given consent to treat with full understanding of what to expect during the session. Before acupuncture began, provider explained to the patient to communicate at any time if the procedure was causing discomfort past their tolerance level. Patient agreed to advise acupuncturist. The acupuncturist counseled the patient on the risks of acupuncture treatment including pain, infection, bleeding, and no relief of pain. The patient was positioned comfortably. There was no evidence of infection at the site of needle insertions.    Objective   Physical Exam         Treatment Plan  Treatment Goals: Pain management, Wellbeing improvement, Stress reduction    Acupuncture Treatment  Patient Position: Supine on a table  Needle Guage: 40 guage /.16/ Red berthan  Body Points - Left: GB41  Body Points - Bilateral: KD7, 4 Carnes, ST Qi line x3, (YT)  Body Points - Right: SJ5  Needle Count In: 15  Needle Count Out: 15  Needle Retention Time (min): 25 minutes  Total Face to Face Time (min): 20 minutes              Assessment/Plan

## 2024-06-10 ENCOUNTER — HOSPITAL ENCOUNTER (OUTPATIENT)
Dept: RADIOLOGY | Facility: HOSPITAL | Age: 44
Discharge: HOME | End: 2024-06-10
Payer: COMMERCIAL

## 2024-06-10 VITALS — HEIGHT: 67 IN | BODY MASS INDEX: 32.18 KG/M2 | WEIGHT: 205 LBS

## 2024-06-10 DIAGNOSIS — Z00.00 ANNUAL PHYSICAL EXAM: ICD-10-CM

## 2024-06-10 PROCEDURE — 77063 BREAST TOMOSYNTHESIS BI: CPT | Performed by: RADIOLOGY

## 2024-06-10 PROCEDURE — 77063 BREAST TOMOSYNTHESIS BI: CPT

## 2024-06-10 PROCEDURE — 77067 SCR MAMMO BI INCL CAD: CPT

## 2024-06-10 PROCEDURE — 77067 SCR MAMMO BI INCL CAD: CPT | Performed by: RADIOLOGY

## 2024-06-16 ENCOUNTER — HOSPITAL ENCOUNTER (EMERGENCY)
Facility: HOSPITAL | Age: 44
Discharge: HOME | End: 2024-06-16
Attending: EMERGENCY MEDICINE
Payer: COMMERCIAL

## 2024-06-16 ENCOUNTER — APPOINTMENT (OUTPATIENT)
Dept: RADIOLOGY | Facility: HOSPITAL | Age: 44
End: 2024-06-16
Payer: COMMERCIAL

## 2024-06-16 VITALS
OXYGEN SATURATION: 95 % | SYSTOLIC BLOOD PRESSURE: 119 MMHG | RESPIRATION RATE: 16 BRPM | TEMPERATURE: 97.7 F | WEIGHT: 210 LBS | HEART RATE: 71 BPM | DIASTOLIC BLOOD PRESSURE: 67 MMHG | BODY MASS INDEX: 33.75 KG/M2 | HEIGHT: 66 IN

## 2024-06-16 DIAGNOSIS — S63.501A SPRAIN OF RIGHT WRIST, INITIAL ENCOUNTER: Primary | ICD-10-CM

## 2024-06-16 PROCEDURE — 73110 X-RAY EXAM OF WRIST: CPT | Mod: RT

## 2024-06-16 PROCEDURE — 99284 EMERGENCY DEPT VISIT MOD MDM: CPT

## 2024-06-16 PROCEDURE — 73130 X-RAY EXAM OF HAND: CPT | Mod: RIGHT SIDE | Performed by: RADIOLOGY

## 2024-06-16 PROCEDURE — 73110 X-RAY EXAM OF WRIST: CPT | Mod: RIGHT SIDE | Performed by: RADIOLOGY

## 2024-06-16 PROCEDURE — 73130 X-RAY EXAM OF HAND: CPT | Mod: RT

## 2024-06-16 RX ORDER — OXYCODONE AND ACETAMINOPHEN 5; 325 MG/1; MG/1
1 TABLET ORAL ONCE
Status: DISCONTINUED | OUTPATIENT
Start: 2024-06-16 | End: 2024-06-16

## 2024-06-16 RX ORDER — ACETAMINOPHEN 325 MG/1
975 TABLET ORAL ONCE
Status: COMPLETED | OUTPATIENT
Start: 2024-06-16 | End: 2024-06-16

## 2024-06-16 ASSESSMENT — PAIN DESCRIPTION - PAIN TYPE: TYPE: ACUTE PAIN

## 2024-06-16 ASSESSMENT — PAIN SCALES - GENERAL
PAINLEVEL_OUTOF10: 5 - MODERATE PAIN
PAINLEVEL_OUTOF10: 6

## 2024-06-16 ASSESSMENT — COLUMBIA-SUICIDE SEVERITY RATING SCALE - C-SSRS
1. IN THE PAST MONTH, HAVE YOU WISHED YOU WERE DEAD OR WISHED YOU COULD GO TO SLEEP AND NOT WAKE UP?: NO
6. HAVE YOU EVER DONE ANYTHING, STARTED TO DO ANYTHING, OR PREPARED TO DO ANYTHING TO END YOUR LIFE?: NO
2. HAVE YOU ACTUALLY HAD ANY THOUGHTS OF KILLING YOURSELF?: NO

## 2024-06-16 ASSESSMENT — PAIN - FUNCTIONAL ASSESSMENT: PAIN_FUNCTIONAL_ASSESSMENT: 0-10

## 2024-06-16 ASSESSMENT — LIFESTYLE VARIABLES
HAVE YOU EVER FELT YOU SHOULD CUT DOWN ON YOUR DRINKING: NO
HAVE PEOPLE ANNOYED YOU BY CRITICIZING YOUR DRINKING: NO
TOTAL SCORE: 0
EVER FELT BAD OR GUILTY ABOUT YOUR DRINKING: NO
EVER HAD A DRINK FIRST THING IN THE MORNING TO STEADY YOUR NERVES TO GET RID OF A HANGOVER: NO

## 2024-06-16 ASSESSMENT — PAIN DESCRIPTION - ORIENTATION: ORIENTATION: RIGHT

## 2024-06-16 ASSESSMENT — PAIN DESCRIPTION - LOCATION: LOCATION: HAND

## 2024-06-16 NOTE — DISCHARGE INSTRUCTIONS
Seen the emergency department today after you sustained a hand and wrist injury.  X-ray indicated that there was no fractures or dislocations.  Please continue to ice your wrist take ibuprofen and Tylenol as needed for pain.  If you develop any new pain fever nausea or vomiting.  Please return back to emergency department soon as possible please follow-up with your primary care provider within the next week.

## 2024-06-16 NOTE — ED PROVIDER NOTES
EMERGENCY DEPARTMENT ENCOUNTER      Pt Name: Caprice Cowart  MRN: [id number]  Birthdate 1980  Date of evaluation: 6/16/2024  Provider: José Padilla MD    CHIEF COMPLAINT       Chief Complaint   Patient presents with    Hand Injury    Wrist Injury     Right. Mechanical fall. Denies LOC/ denies blood thinner.          HISTORY OF PRESENT ILLNESS    HPI  44-year-old female presents emergency department with chief complaint of a hand wrist injury.  Patient indicates that she was running downhill after being chased by be doing yard work at her mother's house.  She tripped and fell landed on her right arm.  She denies any loss of consciousness or hitting her head.  The patient is not on blood thinners.  She is able to move all other extremities denies pain anywhere else.  Her main concern was for her right arm.  Nursing Notes were reviewed.    PAST MEDICAL HISTORY     Past Medical History:   Diagnosis Date    Abnormal Pap smear of cervix     Abnormal uterine bleeding (AUB) 02/07/2023    Acute maxillary sinusitis 02/07/2023    Acute upper respiratory infection, unspecified 11/20/2019    Acute URI    Allergic     Allergy status to unspecified drugs, medicaments and biological substances     History of seasonal allergies    Anemia     Anxiety     Body mass index (BMI) 34.0-34.9, adult 01/08/2021    Body mass index (BMI) of 34.0 to 34.9 in adult    Depression     Diabetes mellitus (Multi)     Eczema     Elevated heart rate with elevated blood pressure without diagnosis of hypertension 02/07/2023    Excessive and frequent menstruation with regular cycle 04/02/2020    Spotting    Female pelvic pain 02/07/2023    Flank pain 02/07/2023    GERD (gastroesophageal reflux disease)     Headache     HPV (human papilloma virus) infection     Hypertension     Menorrhagia with regular cycle 02/07/2023    Other abnormal glucose 01/08/2021    Elevated glucose    Personal history of other diseases of the digestive system     History  of irritable bowel syndrome    Personal history of other diseases of the digestive system     History of esophageal reflux    Personal history of other diseases of the nervous system and sense organs     History of migraine    Personal history of other endocrine, nutritional and metabolic disease 07/08/2020    History of obesity    Personal history of other infectious and parasitic diseases 10/28/2019    History of viral infection    Personal history of other specified conditions 10/28/2019    History of fever    Rash and other nonspecific skin eruption 07/20/2020    Rash    Urinary tract infection, site not specified 05/30/2020    Acute UTI (urinary tract infection)    Visual impairment     Vitamin D deficiency, unspecified     Vitamin D deficiency         SURGICAL HISTORY       Past Surgical History:   Procedure Laterality Date    ADENOIDECTOMY      APPENDECTOMY  8/8/2022    HYSTERECTOMY  4/4/2023    KNEE SURGERY  08/15/2016    Knee Surgery    LAPAROSCOPIC HYSTERECTOMY      WITHOUT BILATERAL OOPHORECTOMY    LAPAROSCOPY DIAGNOSTIC / BIOPSY / ASPIRATION / LYSIS  10/14/2022    Exploratory Laparoscopy    OTHER SURGICAL HISTORY  08/15/2016    Sigmoidoscopy (Fiberoptic, Therapeutic )    OTHER SURGICAL HISTORY  08/23/2022    Laparoscopy    OTHER SURGICAL HISTORY  08/23/2022    Appendectomy laparoscopic    OTHER SURGICAL HISTORY  08/23/2022    Presacral neurectomy    TONSILLECTOMY  08/15/2016    Tonsillectomy         CURRENT MEDICATIONS       Previous Medications    ACCU-CHEK GUIDE ME GLUCOSE MTR MISC    Use to test blood sugars as directed twice daily    ATORVASTATIN (LIPITOR) 40 MG TABLET    Take 1 tablet (40 mg) by mouth once daily.    BLOOD SUGAR DIAGNOSTIC (BLOOD GLUCOSE TEST) STRIP    TEST twice a day    BUDESONIDE (RHINOCORT AQ) 32 MCG/ACTUATION NASAL SPRAY    Administer 1 spray into each nostril once daily.    CHOLECALCIFEROL (VITAMIN D3) 5,000 UNITS TABLET    Take 1 tablet (5,000 Units) by mouth once daily.     DIAZEPAM (VALIUM) 5 MG TABLET    Take by mouth every 8 hours if needed.    DULOXETINE (CYMBALTA) 60 MG DR CAPSULE    Take 1 capsule (60 mg) by mouth once daily.    FEXOFENADINE (ALLEGRA) 180 MG TABLET    Take 1 tablet (180 mg) by mouth once daily.    METFORMIN (GLUCOPHAGE) 500 MG TABLET    Take 2 tablets (1,000 mg) by mouth 2 times daily (morning and late afternoon).    METOPROLOL SUCCINATE XL (TOPROL-XL) 25 MG 24 HR TABLET    Take 1 tablet (25 mg) by mouth once daily. Do not crush or chew.    MONTELUKAST (SINGULAIR) 10 MG TABLET    Take 1 tablet (10 mg) by mouth once daily.    MULTIVITAMIN TABLET    Take by mouth.    OMEPRAZOLE (PRILOSEC) 20 MG DR CAPSULE    Take 1 capsule (20 mg) by mouth once daily.    RIMEGEPANT (NURTEC) 75 MG TABLET,DISINTEGRATING    Take 1 tablet (75 mg) by mouth every other day.    SEMAGLUTIDE (OZEMPIC) 0.25 MG OR 0.5 MG (2 MG/3 ML) PEN INJECTOR    Inject 0.25 mg under the skin 1 (one) time per week.       ALLERGIES     Penicillins, Promethazine, Sumatriptan, Sumatriptan-naproxen, and Neomycin-bacitracin-polymyxin    FAMILY HISTORY       Family History   Problem Relation Name Age of Onset    Diabetes Mother Yoko     Kidney failure Mother Yoko     Heart failure Mother Yoko     Arthritis Mother Yoko     Asthma Mother Yoko     Depression Mother Yoko     Hearing loss Mother Yoko     Hyperlipidemia Mother Yoko     Kidney disease Mother Yoko     Vision loss Mother Yoko     Other (ATHEROSCLEROSIS) Father Hector     Other (CABG) Father Hector     COPD Father Hector     Coronary artery disease Father Hector     Diabetes Father Hector     Hypertension Father Hector     Atrial fibrillation Father Hector     Alcohol abuse Father Hector     Hearing loss Father Hector     Heart disease Father Hector     Hyperlipidemia Father Hectro     Cancer Father Hector     Colon cancer Paternal Grandmother Lashanda     Alcohol abuse Brother Lionel     Alcohol abuse Brother Hector II      Alcohol abuse Brother Figueroa     Drug abuse Brother Figueroa     Arthritis Mother's Sister Lynbipin     Asthma Mother's Sister Raisa     Hearing loss Mother's Sister Raisa     Kidney disease Mother's Sister Raisa           SOCIAL HISTORY       Social History     Socioeconomic History    Marital status:      Spouse name: None    Number of children: None    Years of education: None    Highest education level: None   Occupational History    None   Tobacco Use    Smoking status: Former     Current packs/day: 1.00     Types: Cigarettes     Passive exposure: Past    Smokeless tobacco: Never   Substance and Sexual Activity    Alcohol use: Yes     Comment: Rare    Drug use: Never    Sexual activity: Yes     Partners: Male     Birth control/protection: None     Comment: Hysterectomy 4/4/2023   Other Topics Concern    None   Social History Narrative    None     Social Determinants of Health     Financial Resource Strain: Not on file   Food Insecurity: Not on file   Transportation Needs: Not on file   Physical Activity: Not on file   Stress: Not on file   Social Connections: Not on file   Intimate Partner Violence: Not on file   Housing Stability: Not on file       SCREENINGS                        PHYSICAL EXAM    (up to 7 for level 4, 8 or more for level 5)     ED Triage Vitals [06/16/24 1704]   Temperature Heart Rate Respirations BP   36.3 °C (97.3 °F) 86 16 122/75      Pulse Ox Temp Source Heart Rate Source Patient Position   98 % Temporal Monitor Sitting      BP Location FiO2 (%)     Left arm --       Physical Exam  Constitutional:       Appearance: Normal appearance.   HENT:      Head: Normocephalic and atraumatic.      Nose: Nose normal.      Mouth/Throat:      Mouth: Mucous membranes are moist.      Pharynx: Oropharynx is clear.   Eyes:      Extraocular Movements: Extraocular movements intact.      Conjunctiva/sclera: Conjunctivae normal.      Pupils: Pupils are equal, round, and reactive to light.    Cardiovascular:      Rate and Rhythm: Normal rate and regular rhythm.      Pulses: Normal pulses.      Heart sounds: Normal heart sounds.   Pulmonary:      Effort: Pulmonary effort is normal.      Breath sounds: Normal breath sounds.   Abdominal:      General: Abdomen is flat. Bowel sounds are normal.      Palpations: Abdomen is soft.   Musculoskeletal:        Arms:       Comments: Patient endorses pain to the right medial aspect of the hand and wrist.  Pulses and sensation are intact bilaterally.  Strength is decreased on the right hand.   Neurological:      Mental Status: She is alert.          DIAGNOSTIC RESULTS     LABS:  Labs Reviewed - No data to display    All other labs were within normal range or not returned as of this dictation.    Imaging  XR hand right 3+ views    (Results Pending)   XR wrist right 3+ views    (Results Pending)        Procedures  Procedures     EMERGENCY DEPARTMENT COURSE/MDM:     Diagnoses as of 06/16/24 2025   Sprain of right wrist, initial encounter        Medical Decision Making  44-year-old female presents emergency department with chief complaint of a hand injury.  Medical management treatment emergency department consist of pain management and x-ray.  X-ray of the wrist and hand shows no acute fracture dislocation the patient has been informed of this.  She was told to continue with ice ibuprofen Tylenol and off as needed.  And to move as tolerated also indicated to the patient that she should follow-up with her primary care provider within the week.  The patient indicated she feels better we will discharge the patient home.        Patient and or family in agreement and understanding of treatment plan.  All questions answered.      I reviewed the case with the attending ED physician. The attending ED physician agrees with the plan. Patient and/or patient´s representative was counseled regarding labs, imaging, likely diagnosis, and plan. All questions were answered.    ED  Medications administered this visit:    Medications   oxyCODONE-acetaminophen (Percocet) 5-325 mg per tablet 1 tablet (has no administration in time range)       New Prescriptions from this visit:    New Prescriptions    No medications on file       Follow-up:  No follow-up provider specified.      Final Impression: No diagnosis found.      (Please note that portions of this note were completed with a voice recognition program.  Efforts were made to edit the dictations but occasionally words are mis-transcribed.)     José Padilla MD  Resident  06/16/24 2026

## 2024-06-18 ENCOUNTER — OFFICE VISIT (OUTPATIENT)
Dept: PRIMARY CARE | Facility: CLINIC | Age: 44
End: 2024-06-18
Payer: COMMERCIAL

## 2024-06-18 ENCOUNTER — HOSPITAL ENCOUNTER (OUTPATIENT)
Dept: RADIOLOGY | Facility: HOSPITAL | Age: 44
Discharge: HOME | End: 2024-06-18
Payer: COMMERCIAL

## 2024-06-18 ENCOUNTER — HOSPITAL ENCOUNTER (OUTPATIENT)
Dept: CARDIOLOGY | Facility: HOSPITAL | Age: 44
Discharge: HOME | End: 2024-06-18
Payer: COMMERCIAL

## 2024-06-18 VITALS
HEART RATE: 64 BPM | TEMPERATURE: 97.3 F | WEIGHT: 215 LBS | BODY MASS INDEX: 34.55 KG/M2 | HEIGHT: 66 IN | DIASTOLIC BLOOD PRESSURE: 72 MMHG | SYSTOLIC BLOOD PRESSURE: 124 MMHG | RESPIRATION RATE: 20 BRPM

## 2024-06-18 DIAGNOSIS — M25.511 ACUTE PAIN OF RIGHT SHOULDER: Primary | ICD-10-CM

## 2024-06-18 DIAGNOSIS — M25.551 RIGHT HIP PAIN: ICD-10-CM

## 2024-06-18 DIAGNOSIS — M25.511 ACUTE PAIN OF RIGHT SHOULDER: ICD-10-CM

## 2024-06-18 DIAGNOSIS — M79.661 RIGHT CALF PAIN: ICD-10-CM

## 2024-06-18 DIAGNOSIS — M79.604 PAIN IN RIGHT LEG: ICD-10-CM

## 2024-06-18 PROCEDURE — 73502 X-RAY EXAM HIP UNI 2-3 VIEWS: CPT | Mod: RT

## 2024-06-18 PROCEDURE — 93971 EXTREMITY STUDY: CPT

## 2024-06-18 PROCEDURE — 72110 X-RAY EXAM L-2 SPINE 4/>VWS: CPT

## 2024-06-18 PROCEDURE — 99214 OFFICE O/P EST MOD 30 MIN: CPT | Performed by: FAMILY MEDICINE

## 2024-06-18 PROCEDURE — 73030 X-RAY EXAM OF SHOULDER: CPT | Mod: RIGHT SIDE | Performed by: RADIOLOGY

## 2024-06-18 PROCEDURE — 72110 X-RAY EXAM L-2 SPINE 4/>VWS: CPT | Performed by: RADIOLOGY

## 2024-06-18 PROCEDURE — 73030 X-RAY EXAM OF SHOULDER: CPT | Mod: RT

## 2024-06-18 PROCEDURE — 73502 X-RAY EXAM HIP UNI 2-3 VIEWS: CPT | Mod: RIGHT SIDE | Performed by: RADIOLOGY

## 2024-06-18 RX ORDER — CYCLOBENZAPRINE HCL 5 MG
5 TABLET ORAL NIGHTLY PRN
Qty: 14 TABLET | Refills: 0 | Status: SHIPPED | OUTPATIENT
Start: 2024-06-18 | End: 2024-06-18

## 2024-06-18 RX ORDER — CYCLOBENZAPRINE HCL 5 MG
5 TABLET ORAL NIGHTLY PRN
Qty: 14 TABLET | Refills: 0 | Status: SHIPPED | OUTPATIENT
Start: 2024-06-18 | End: 2024-07-02

## 2024-06-18 RX ORDER — DICLOFENAC SODIUM 75 MG/1
75 TABLET, DELAYED RELEASE ORAL 2 TIMES DAILY PRN
Qty: 60 TABLET | Refills: 0 | Status: SHIPPED | OUTPATIENT
Start: 2024-06-18 | End: 2024-06-18

## 2024-06-18 RX ORDER — DICLOFENAC SODIUM 75 MG/1
75 TABLET, DELAYED RELEASE ORAL 2 TIMES DAILY PRN
Qty: 60 TABLET | Refills: 0 | Status: SHIPPED | OUTPATIENT
Start: 2024-06-18 | End: 2024-08-17

## 2024-06-18 ASSESSMENT — ENCOUNTER SYMPTOMS
COUGH: 0
HEMATURIA: 0
WEAKNESS: 0
VOMITING: 0
NAUSEA: 0
SHORTNESS OF BREATH: 0
DIARRHEA: 0
NUMBNESS: 0
ARTHRALGIAS: 1
WHEEZING: 0

## 2024-06-18 NOTE — ASSESSMENT & PLAN NOTE
Pt to [facility] as directed for right sided pain  Get xr done today  Rest, heat to affected areas  F/up if no improvement

## 2024-06-18 NOTE — PROGRESS NOTES
"Subjective   Patient ID: Caprice Cowart is a 44 y.o. female who presents for Follow-up (ER follow up. Pt was chased by bee's and fell down a hill 2 days ago (6/16/24). She landed on the right side of her body. She had some swelling and pain on her rt wrist. That has improved but she is here today for the rt shoulder, hip, and leg pain. ).    HPI     Review of Systems   Constitutional:         Seen at [facility] ER 6/16/24 fell onto R side   Xr right hand/wrist negative  Told to use ice and motrin.  Now pain in right shoulder , hip and pain down right leg.  Pt is taking tylenol and advil .   Respiratory:  Negative for cough, shortness of breath and wheezing.    Cardiovascular:  Negative for chest pain.   Gastrointestinal:  Negative for diarrhea, nausea and vomiting.   Genitourinary:  Negative for hematuria.   Musculoskeletal:  Positive for arthralgias.        Right shoulder pain over deltoid and rhomboids, pain is aching, throbbing  Worse with movement, improved with rest. No numbness or weakness in RUE    Right hip pain , sore and achey, worse with walking , sitting, improved with nothing  No numbness or weakness    Pain down RLE from right upper thigh to loer thigh, from knee to ankle.   Neurological:  Negative for weakness and numbness.       Objective   /72   Pulse 64   Temp 36.3 °C (97.3 °F)   Resp 20   Ht 1.676 m (5' 6\")   Wt 97.5 kg (215 lb)   LMP 03/16/2023   BMI 34.70 kg/m²     Physical Exam  Vitals and nursing note reviewed.   Constitutional:       General: She is not in acute distress.     Appearance: Normal appearance.   HENT:      Head: Normocephalic and atraumatic.   Cardiovascular:      Rate and Rhythm: Normal rate and regular rhythm.      Heart sounds: Normal heart sounds.   Pulmonary:      Effort: Pulmonary effort is normal.      Breath sounds: Normal breath sounds.   Musculoskeletal:         General: Tenderness present. No swelling or deformity.      Right lower leg: No edema.      " Comments: Neck  is straight, tender to right trapezius and A-C jt  FROM neurovasc intact    Back is straight tender to R LB, mild spasm  FROM of hips, knee and ankle, no erythema ,edema or ecchymosis  There is mild right calf pain to palpation  Neurovasc intact   Skin:     General: Skin is warm and dry.   Neurological:      Mental Status: She is alert.      Sensory: No sensory deficit.      Motor: No weakness.      Gait: Gait normal.         Assessment/Plan   Problem List Items Addressed This Visit             ICD-10-CM    Acute pain of right shoulder - Primary M25.511     Pt to [facility] as directed for right sided pain  Get xr done today  Rest, heat to affected areas  F/up if no improvement          Relevant Medications    diclofenac (Voltaren) 75 mg EC tablet    Other Relevant Orders    XR shoulder right 2+ views    Right hip pain M25.551    Relevant Medications    cyclobenzaprine (Flexeril) 5 mg tablet    diclofenac (Voltaren) 75 mg EC tablet    Other Relevant Orders    XR hip right with pelvis when performed 4+ views    XR lumbar spine complete 4+ views    Right calf pain M79.661    Relevant Medications    diclofenac (Voltaren) 75 mg EC tablet    Other Relevant Orders    Vascular US Lower Extremity Venous Duplex Right

## 2024-06-19 ENCOUNTER — APPOINTMENT (OUTPATIENT)
Dept: INTEGRATIVE MEDICINE | Facility: CLINIC | Age: 44
End: 2024-06-19
Payer: COMMERCIAL

## 2024-06-19 DIAGNOSIS — G89.29 CHRONIC NECK PAIN: Primary | ICD-10-CM

## 2024-06-19 DIAGNOSIS — G43.009 MIGRAINE WITHOUT AURA AND WITHOUT STATUS MIGRAINOSUS, NOT INTRACTABLE: ICD-10-CM

## 2024-06-19 DIAGNOSIS — M54.2 CHRONIC NECK PAIN: Primary | ICD-10-CM

## 2024-06-19 PROCEDURE — 97811 ACUP 1/> W/O ESTIM EA ADD 15: CPT | Performed by: ACUPUNCTURIST

## 2024-06-19 PROCEDURE — RXMED WILLOW AMBULATORY MEDICATION CHARGE

## 2024-06-19 PROCEDURE — 97810 ACUP 1/> WO ESTIM 1ST 15 MIN: CPT | Performed by: ACUPUNCTURIST

## 2024-06-19 ASSESSMENT — ENCOUNTER SYMPTOMS
NAUSEA: 1
HEADACHES: 1
VOMITING: 1
NECK PAIN: 1

## 2024-06-19 NOTE — PATIENT INSTRUCTIONS
Frequency of visits: Return as needed for symptom management    Diet/lifestyle suggestions: Drink enough water over the next few days; apply heat as directed to affected areas    Please feel free to contact me with any questions or concerns

## 2024-06-19 NOTE — PROGRESS NOTES
"Acupuncture Visit:     Please note: Dictation software was used while completing this note and may contain spelling, grammatical, and/or syntax errors.  Please contact me with any questions.    To clinicians, I am always happy to partner with you in the care of your patients. Do not hesitate to call the office or contact me directly regarding any further consultations or with questions regarding the plan of care outlined or patient progress.    Subjective   Patient ID: Caprice Cowart is a 44 y.o. female who presents for Neck Pain and Migraine    Insurance visit 8 of 20    Patient tripped and fell over the weekend, landing on her R side; she is experiencing soreness in her R shoulder down to her R leg, and is wearing a hard brace on her wrist    She also reports having several migraine since her last visit      Initial intake (02/14/2024 )    Pt came in today seeking treatment for chronic neck pain and migraine; [place] [place]    Patient stated that she has had migraines since her early 20s, and they became worse after her hysterectomy; she has not had any migraines since Christmas, taking both Ubrelvy and Topamax; her migraines are generally bilaterally temporal, lasting hours to days, accompanied by noise sensitivity, nausea, and vomiting; patient also gets tension headaches from neck pain    Patient had a hysterectomy 4/2023 due to stage IV endometriosis, noting that she does not feel like she has \"bounced back\" like she expected; she is also experiencing fatigue, noting that she is recovering from COVID    Migraine   This is a chronic problem. The problem has been gradually improving. The pain is located in the Temporal region. Associated symptoms include nausea, neck pain, phonophobia and vomiting.   Neck Pain   This is a chronic problem. Associated symptoms include headaches.       Session Information  Is this acupuncture treatment being billed to the patient's insurance " company: Yes  This is visit number: 8  The patient has a total number of visits of: 20  Last Treatment date: 06/05/24  Name of Insurance Company:  Employee Medical Plan  Visit Type: Follow-up visit  Medical History Reviewed: I have reviewed pertinent medical history in EHR, and no contraindications are present to provide treatment         Review of Systems   Gastrointestinal:  Positive for nausea and vomiting.   Musculoskeletal:  Positive for neck pain.   Neurological:  Positive for headaches.            Provider reviewed plan for the acupuncture session, precautions and contraindications. Patient/guardian/hospital staff has given consent to treat with full understanding of what to expect during the session. Before acupuncture began, provider explained to the patient to communicate at any time if the procedure was causing discomfort past their tolerance level. Patient agreed to advise acupuncturist. The acupuncturist counseled the patient on the risks of acupuncture treatment including pain, infection, bleeding, and no relief of pain. The patient was positioned comfortably. There was no evidence of infection at the site of needle insertions.    Objective   Physical Exam         Treatment Plan  Treatment Goals: Pain management, Wellbeing improvement                   Assessment/Plan

## 2024-06-21 ENCOUNTER — PHARMACY VISIT (OUTPATIENT)
Dept: PHARMACY | Facility: CLINIC | Age: 44
End: 2024-06-21
Payer: COMMERCIAL

## 2024-07-01 ENCOUNTER — OFFICE VISIT (OUTPATIENT)
Dept: PRIMARY CARE | Facility: CLINIC | Age: 44
End: 2024-07-01
Payer: COMMERCIAL

## 2024-07-01 VITALS
BODY MASS INDEX: 34.7 KG/M2 | OXYGEN SATURATION: 98 % | HEART RATE: 82 BPM | TEMPERATURE: 98.7 F | WEIGHT: 215 LBS | SYSTOLIC BLOOD PRESSURE: 122 MMHG | DIASTOLIC BLOOD PRESSURE: 80 MMHG | RESPIRATION RATE: 16 BRPM

## 2024-07-01 DIAGNOSIS — R39.9 UTI SYMPTOMS: ICD-10-CM

## 2024-07-01 LAB
POC APPEARANCE, URINE: ABNORMAL
POC BILIRUBIN, URINE: ABNORMAL
POC BLOOD, URINE: ABNORMAL
POC COLOR, URINE: ABNORMAL
POC GLUCOSE, URINE: NEGATIVE MG/DL
POC KETONES, URINE: NEGATIVE MG/DL
POC LEUKOCYTES, URINE: ABNORMAL
POC NITRITE,URINE: NEGATIVE
POC PH, URINE: 5 PH
POC PROTEIN, URINE: ABNORMAL MG/DL
POC SPECIFIC GRAVITY, URINE: >=1.03
POC UROBILINOGEN, URINE: 0.2 EU/DL

## 2024-07-01 PROCEDURE — 87086 URINE CULTURE/COLONY COUNT: CPT

## 2024-07-01 PROCEDURE — 3044F HG A1C LEVEL LT 7.0%: CPT | Performed by: FAMILY MEDICINE

## 2024-07-01 PROCEDURE — 3074F SYST BP LT 130 MM HG: CPT | Performed by: FAMILY MEDICINE

## 2024-07-01 PROCEDURE — 1036F TOBACCO NON-USER: CPT | Performed by: FAMILY MEDICINE

## 2024-07-01 PROCEDURE — 81002 URINALYSIS NONAUTO W/O SCOPE: CPT | Performed by: FAMILY MEDICINE

## 2024-07-01 PROCEDURE — 3079F DIAST BP 80-89 MM HG: CPT | Performed by: FAMILY MEDICINE

## 2024-07-01 PROCEDURE — 87186 SC STD MICRODIL/AGAR DIL: CPT

## 2024-07-01 PROCEDURE — 99214 OFFICE O/P EST MOD 30 MIN: CPT | Performed by: FAMILY MEDICINE

## 2024-07-01 PROCEDURE — 3061F NEG MICROALBUMINURIA REV: CPT | Performed by: FAMILY MEDICINE

## 2024-07-01 PROCEDURE — 3048F LDL-C <100 MG/DL: CPT | Performed by: FAMILY MEDICINE

## 2024-07-01 RX ORDER — SULFAMETHOXAZOLE AND TRIMETHOPRIM 800; 160 MG/1; MG/1
1 TABLET ORAL 2 TIMES DAILY
Qty: 14 TABLET | Refills: 0 | Status: SHIPPED | OUTPATIENT
Start: 2024-07-01 | End: 2024-07-08

## 2024-07-01 ASSESSMENT — ENCOUNTER SYMPTOMS
FREQUENCY: 1
BLOOD IN STOOL: 0
DYSURIA: 1
FEVER: 0
WHEEZING: 0
HEMATURIA: 1
NAUSEA: 0
COUGH: 0
DIARRHEA: 0
VOMITING: 0
FLANK PAIN: 1

## 2024-07-01 NOTE — ASSESSMENT & PLAN NOTE
Pt ua is positive, will send uc  Pt to start atbx today, take as directed  Increase flds, cranberry juice/tabs  F/up if no improvement

## 2024-07-01 NOTE — PROGRESS NOTES
Subjective   Patient ID: Caprice Cowart is a 44 y.o. female who presents for Urinary Frequency.    Urinary Frequency   The current episode started today. The quality of the pain is described as aching and burning. Associated symptoms include flank pain, frequency and hematuria. Pertinent negatives include no nausea or vomiting.        Review of Systems   Constitutional:  Negative for fever.   HENT:  Positive for congestion.    Respiratory:  Negative for cough and wheezing.    Gastrointestinal:  Negative for blood in stool, diarrhea, nausea and vomiting.   Genitourinary:  Positive for dysuria, flank pain, frequency and hematuria. Negative for vaginal bleeding and vaginal discharge.        Symptoms started today.       Objective   /80   Pulse 82   Temp 37.1 °C (98.7 °F)   Resp 16   Wt 97.5 kg (215 lb)   LMP 03/16/2023   SpO2 98%   BMI 34.70 kg/m²     Physical Exam  Vitals and nursing note reviewed.   Constitutional:       General: She is not in acute distress.     Appearance: Normal appearance.   HENT:      Head: Normocephalic and atraumatic.   Cardiovascular:      Rate and Rhythm: Normal rate and regular rhythm.      Heart sounds: Normal heart sounds.   Pulmonary:      Effort: Pulmonary effort is normal.      Breath sounds: Normal breath sounds.   Abdominal:      General: Bowel sounds are normal.      Palpations: Abdomen is soft.      Tenderness: There is abdominal tenderness. There is no right CVA tenderness, left CVA tenderness, guarding or rebound.      Comments: Suprapubic tenderness   Skin:     General: Skin is warm and dry.   Neurological:      Mental Status: She is alert.         Assessment/Plan   Problem List Items Addressed This Visit             ICD-10-CM    UTI symptoms R39.9     Pt ua is positive, will send uc  Pt to start atbx today, take as directed  Increase flds, cranberry juice/tabs  F/up if no improvement         Relevant Medications    sulfamethoxazole-trimethoprim (Bactrim DS)  800-160 mg tablet    Other Relevant Orders    POCT UA (nonautomated) manually resulted (Completed)    Urine Culture

## 2024-07-03 PROCEDURE — RXMED WILLOW AMBULATORY MEDICATION CHARGE

## 2024-07-04 LAB — BACTERIA UR CULT: ABNORMAL

## 2024-07-08 ENCOUNTER — PHARMACY VISIT (OUTPATIENT)
Dept: PHARMACY | Facility: CLINIC | Age: 44
End: 2024-07-08
Payer: COMMERCIAL

## 2024-07-09 ENCOUNTER — PHARMACY VISIT (OUTPATIENT)
Dept: PHARMACY | Facility: CLINIC | Age: 44
End: 2024-07-09
Payer: COMMERCIAL

## 2024-07-09 DIAGNOSIS — G43.009 MIGRAINE WITHOUT AURA AND WITHOUT STATUS MIGRAINOSUS, NOT INTRACTABLE: Primary | ICD-10-CM

## 2024-07-09 PROCEDURE — RXMED WILLOW AMBULATORY MEDICATION CHARGE

## 2024-07-09 RX ORDER — ONDANSETRON 8 MG/1
8 TABLET, ORALLY DISINTEGRATING ORAL EVERY 8 HOURS PRN
Qty: 20 TABLET | Refills: 0 | Status: SHIPPED | OUTPATIENT
Start: 2024-07-09 | End: 2024-07-16

## 2024-07-12 PROCEDURE — RXMED WILLOW AMBULATORY MEDICATION CHARGE

## 2024-07-15 ENCOUNTER — PHARMACY VISIT (OUTPATIENT)
Dept: PHARMACY | Facility: CLINIC | Age: 44
End: 2024-07-15
Payer: COMMERCIAL

## 2024-07-24 ENCOUNTER — OFFICE VISIT (OUTPATIENT)
Dept: PRIMARY CARE | Facility: CLINIC | Age: 44
End: 2024-07-24
Payer: COMMERCIAL

## 2024-07-24 VITALS
DIASTOLIC BLOOD PRESSURE: 84 MMHG | TEMPERATURE: 97.2 F | RESPIRATION RATE: 20 BRPM | BODY MASS INDEX: 34.87 KG/M2 | HEART RATE: 100 BPM | SYSTOLIC BLOOD PRESSURE: 122 MMHG | WEIGHT: 217 LBS | HEIGHT: 66 IN

## 2024-07-24 DIAGNOSIS — G43.009 MIGRAINE WITHOUT AURA AND WITHOUT STATUS MIGRAINOSUS, NOT INTRACTABLE: Primary | ICD-10-CM

## 2024-07-24 PROCEDURE — RXMED WILLOW AMBULATORY MEDICATION CHARGE

## 2024-07-24 PROCEDURE — 99214 OFFICE O/P EST MOD 30 MIN: CPT | Performed by: FAMILY MEDICINE

## 2024-07-24 PROCEDURE — 96372 THER/PROPH/DIAG INJ SC/IM: CPT | Performed by: FAMILY MEDICINE

## 2024-07-24 RX ORDER — KETOROLAC TROMETHAMINE 30 MG/ML
60 INJECTION, SOLUTION INTRAMUSCULAR; INTRAVENOUS ONCE
Status: COMPLETED | OUTPATIENT
Start: 2024-07-24 | End: 2024-07-24

## 2024-07-24 RX ORDER — ERENUMAB-AOOE 70 MG/ML
70 INJECTION SUBCUTANEOUS
Qty: 1 ML | Refills: 2 | Status: SHIPPED | OUTPATIENT
Start: 2024-07-24 | End: 2024-10-22

## 2024-07-24 ASSESSMENT — ENCOUNTER SYMPTOMS
HEADACHES: 1
SHORTNESS OF BREATH: 0
FATIGUE: 1
VOMITING: 0
NAUSEA: 0
FEVER: 0
WEAKNESS: 0
NUMBNESS: 0

## 2024-07-24 NOTE — ASSESSMENT & PLAN NOTE
Pt currently  with acute migraine h/a   Nurtec not helping  Will give toradol io today 60 mg IM  Will start aimovig monthly subcutaneous inj for preventitive tx  Pt would like ubrelvy for breakthrough acute migraine  Pt can not take nurtec as preventitive with aimovig, may only use acutely as with ubrelvy  F/up 1 month  Keep appt with neuro

## 2024-07-24 NOTE — PROGRESS NOTES
"Subjective   Patient ID: Caprice Cowart is a 44 y.o. female who presents for Migraine (Nurtec not working. Pt has had migraine since yesterday.).    HPI     Review of Systems   Constitutional:  Positive for fatigue. Negative for fever.   Respiratory:  Negative for shortness of breath.    Cardiovascular:  Negative for chest pain.   Gastrointestinal:  Negative for nausea and vomiting.   Neurological:  Positive for headaches. Negative for weakness and numbness.        Pt with migraine h/a since yest  No relief with nurtec, no n/v but has zofran if needed.  Frequency is weekly,  8 d /mo, right sided, light and noise sens, cna have n/v  Has appt with neuro for eval  Pt would like inj med  Pt would like io toradol for h/a       Objective   /84   Pulse 100   Temp 36.2 °C (97.2 °F)   Resp 20   Ht 1.676 m (5' 6\")   Wt 98.4 kg (217 lb)   LMP 03/16/2023   BMI 35.02 kg/m²     Physical Exam  Vitals and nursing note reviewed.   Constitutional:       Appearance: Normal appearance. She is ill-appearing.   HENT:      Head: Normocephalic and atraumatic.   Cardiovascular:      Rate and Rhythm: Normal rate and regular rhythm.      Heart sounds: Normal heart sounds.   Pulmonary:      Effort: Pulmonary effort is normal.      Breath sounds: Normal breath sounds.   Skin:     General: Skin is warm and dry.   Neurological:      General: No focal deficit present.      Mental Status: She is alert.      Comments: Pt lying flat in darkened exam room, squinting with lights.         Assessment/Plan   Problem List Items Addressed This Visit             ICD-10-CM    Migraine without aura and without status migrainosus, not intractable - Primary G43.009     Pt currently  with acute migraine h/a   Nurtec not helping  Will give toradol io today 60 mg IM  Will start aimovig monthly subcutaneous inj for preventitive tx  Pt would like ubrelvy for breakthrough acute migraine  Pt can not take nurtec as preventitive with aimovig, may only use " acutely as with ubrelvy  F/up 1 month  Keep appt with neuro         Relevant Medications    ketorolac (Toradol) injection 60 mg (Completed)    erenumab (Aimovig Autoinjector) 70 mg/mL injection    ubrogepant 50 mg tablet

## 2024-07-26 ENCOUNTER — PHARMACY VISIT (OUTPATIENT)
Dept: PHARMACY | Facility: CLINIC | Age: 44
End: 2024-07-26
Payer: COMMERCIAL

## 2024-07-27 ENCOUNTER — PHARMACY VISIT (OUTPATIENT)
Dept: PHARMACY | Facility: CLINIC | Age: 44
End: 2024-07-27
Payer: COMMERCIAL

## 2024-08-05 ENCOUNTER — APPOINTMENT (OUTPATIENT)
Dept: INTEGRATIVE MEDICINE | Facility: CLINIC | Age: 44
End: 2024-08-05
Payer: COMMERCIAL

## 2024-08-05 DIAGNOSIS — G43.009 MIGRAINE WITHOUT AURA AND WITHOUT STATUS MIGRAINOSUS, NOT INTRACTABLE: Primary | ICD-10-CM

## 2024-08-05 PROCEDURE — 97810 ACUP 1/> WO ESTIM 1ST 15 MIN: CPT | Performed by: ACUPUNCTURIST

## 2024-08-05 PROCEDURE — 97811 ACUP 1/> W/O ESTIM EA ADD 15: CPT | Performed by: ACUPUNCTURIST

## 2024-08-05 ASSESSMENT — ENCOUNTER SYMPTOMS
NECK PAIN: 1
VOMITING: 1
NAUSEA: 1
HEADACHES: 1

## 2024-08-05 NOTE — PROGRESS NOTES
"Acupuncture Visit:     Please note: Dictation software was used while completing this note and may contain spelling, grammatical, and/or syntax errors.  Please contact me with any questions.    To clinicians, I am always happy to partner with you in the care of your patients. Do not hesitate to call the office or contact me directly regarding any further consultations or with questions regarding the plan of care outlined or patient progress.    Subjective   Patient ID: Caprice Cowart is a 44 y.o. female who presents for Neck Pain and Migraine    Insurance visit 9 of 20    Patient stated that she had approximately 10 migraines during the month of July; she began Aimovig injections last week, and notes feeling good, no longer experiencing constant pressure in her head      Initial intake (02/14/2024 )    Pt came in today seeking treatment for chronic neck pain and migraine; she was referred by Isabel Padilla PA-C ([facility] [place]    Patient stated that she has had migraines since her early 20s, and they became worse after her hysterectomy; she has not had any migraines since Christmas, taking both Ubrelvy and Topamax; her migraines are generally bilaterally temporal, lasting hours to days, accompanied by noise sensitivity, nausea, and vomiting; patient also gets tension headaches from neck pain    Patient had a hysterectomy 4/2023 due to stage IV endometriosis, noting that she does not feel like she has \"bounced back\" like she expected; she is also experiencing fatigue, noting that she is recovering from COVID    Migraine   This is a chronic problem. The problem has been gradually improving. The pain is located in the Temporal region. Associated symptoms include nausea, neck pain, phonophobia and vomiting.   Neck Pain   This is a chronic problem. Associated symptoms include headaches.       Session Information  Is this acupuncture treatment being billed to the patient's insurance company: Yes  This is visit number: 9  The " patient has a total number of visits of: 20  Last Treatment date: 06/19/24  Name of Insurance Company:  Employee Medical Plan  Visit Type: Follow-up visit  Medical History Reviewed: I have reviewed pertinent medical history in EHR, and no contraindications are present to provide treatment  Since Last Visit, Patient Noted Improved: Headache, Fatigue, Muscle tension         Review of Systems   Gastrointestinal:  Positive for nausea and vomiting.   Musculoskeletal:  Positive for neck pain.   Neurological:  Positive for headaches.            Provider reviewed plan for the acupuncture session, precautions and contraindications. Patient/guardian/hospital staff has given consent to treat with full understanding of what to expect during the session. Before acupuncture began, provider explained to the patient to communicate at any time if the procedure was causing discomfort past their tolerance level. Patient agreed to advise acupuncturist. The acupuncturist counseled the patient on the risks of acupuncture treatment including pain, infection, bleeding, and no relief of pain. The patient was positioned comfortably. There was no evidence of infection at the site of needle insertions.    Objective   Physical Exam         Treatment Plan  Treatment Goals: Pain management, Wellbeing improvement    Acupuncture Treatment  Patient Position: Prone on a table  Needle Guage: 40 guage /.16/ Red seirin  Body Points - Bilateral: KD7, BL23, BL52, BL20, BL14, suboccipitals, upper traps  Other Techniques Utilized: TDP Lamp, Topicals  Topicals Description: Posumon oil  TDP Lamp Descripton: Low back, 25min  Needle Count In: 14  Needle Count Out: 14  Needle Retention Time (min): 25 minutes  Total Face to Face Time (min): 20 minutes              Assessment/Plan

## 2024-08-09 ENCOUNTER — LAB (OUTPATIENT)
Dept: LAB | Facility: LAB | Age: 44
End: 2024-08-09
Payer: COMMERCIAL

## 2024-08-09 ENCOUNTER — APPOINTMENT (OUTPATIENT)
Dept: INTEGRATIVE MEDICINE | Facility: CLINIC | Age: 44
End: 2024-08-09
Payer: COMMERCIAL

## 2024-08-09 ENCOUNTER — APPOINTMENT (OUTPATIENT)
Dept: PRIMARY CARE | Facility: CLINIC | Age: 44
End: 2024-08-09
Payer: COMMERCIAL

## 2024-08-09 VITALS
BODY MASS INDEX: 35.03 KG/M2 | HEART RATE: 88 BPM | RESPIRATION RATE: 20 BRPM | SYSTOLIC BLOOD PRESSURE: 104 MMHG | WEIGHT: 218 LBS | TEMPERATURE: 97.6 F | DIASTOLIC BLOOD PRESSURE: 70 MMHG | HEIGHT: 66 IN

## 2024-08-09 DIAGNOSIS — E78.1 HIGH TRIGLYCERIDES: ICD-10-CM

## 2024-08-09 DIAGNOSIS — E66.9 OBESITY (BMI 30-39.9): ICD-10-CM

## 2024-08-09 DIAGNOSIS — E11.9 TYPE 2 DIABETES MELLITUS WITHOUT COMPLICATION, WITHOUT LONG-TERM CURRENT USE OF INSULIN (MULTI): ICD-10-CM

## 2024-08-09 DIAGNOSIS — E11.9 TYPE 2 DIABETES MELLITUS WITHOUT COMPLICATION, WITHOUT LONG-TERM CURRENT USE OF INSULIN (MULTI): Primary | ICD-10-CM

## 2024-08-09 LAB
CHOLEST SERPL-MCNC: 145 MG/DL (ref 0–199)
CHOLESTEROL/HDL RATIO: 2.4
EST. AVERAGE GLUCOSE BLD GHB EST-MCNC: 143 MG/DL
HBA1C MFR BLD: 6.6 %
HDLC SERPL-MCNC: 61.3 MG/DL
LDLC SERPL CALC-MCNC: 51 MG/DL
NON HDL CHOLESTEROL: 84 MG/DL (ref 0–149)
TRIGL SERPL-MCNC: 162 MG/DL (ref 0–149)
VLDL: 32 MG/DL (ref 0–40)

## 2024-08-09 PROCEDURE — 83036 HEMOGLOBIN GLYCOSYLATED A1C: CPT

## 2024-08-09 PROCEDURE — RXMED WILLOW AMBULATORY MEDICATION CHARGE

## 2024-08-09 PROCEDURE — 36415 COLL VENOUS BLD VENIPUNCTURE: CPT

## 2024-08-09 PROCEDURE — 80061 LIPID PANEL: CPT

## 2024-08-09 PROCEDURE — 99214 OFFICE O/P EST MOD 30 MIN: CPT | Performed by: FAMILY MEDICINE

## 2024-08-09 ASSESSMENT — ENCOUNTER SYMPTOMS
DIARRHEA: 0
COUGH: 0
WEAKNESS: 0
WEIGHT LOSS: 0
FATIGUE: 0
NAUSEA: 0
CONSTIPATION: 1
DIZZINESS: 0
VOMITING: 0
POLYPHAGIA: 0
SEIZURES: 0
SWEATS: 0
DIFFICULTY URINATING: 0
SHORTNESS OF BREATH: 0
TREMORS: 0
POLYDIPSIA: 0
HEADACHES: 0
CONFUSION: 0
VISUAL CHANGE: 0
HUNGER: 0
BLACKOUTS: 0
BLURRED VISION: 0
SPEECH DIFFICULTY: 0
NERVOUS/ANXIOUS: 0
WHEEZING: 0

## 2024-08-09 NOTE — ASSESSMENT & PLAN NOTE
Pt on metformin and semaglutide  [facility]  Will increase semaglutide to 0.5 mg weekly for wt loss  F/up 3 mo

## 2024-08-09 NOTE — PROGRESS NOTES
Subjective   Patient ID: Caprice Cowart is a 44 y.o. female who presents for Follow-up (3m DM follow up. Pt has to go down to lab to do lipid, order placed for A1c also.).    Diabetes  She has type 2 diabetes mellitus. No MedicAlert identification noted. The initial diagnosis of diabetes was made 3 years ago. Pertinent negatives for hypoglycemia include no hunger, mood changes, sleepiness or sweats. Pertinent negatives for diabetes include no blurred vision, no fatigue, no foot paresthesias, no foot ulcerations, no visual change and no weight loss. Pertinent negatives for hypoglycemia complications include no blackouts, no hospitalization, no nocturnal hypoglycemia, no required assistance and no required glucagon injection. Symptoms are stable. Pertinent negatives for diabetic complications include no CVA, heart disease, impotence, nephropathy, peripheral neuropathy, PVD or retinopathy. Risk factors for coronary artery disease include dyslipidemia, family history, hypertension, obesity and stress. Current diabetic treatment includes diet, insulin injections and oral agent (dual therapy). She is compliant with treatment most of the time. Her weight is decreasing steadily. She is following a generally healthy diet. When asked about meal planning, she reported none. She has not had a previous visit with a dietitian. She participates in exercise intermittently. She monitors blood glucose at home 1-2 x per week. Blood glucose monitoring compliance is fair. There is no change in her home blood glucose trend. Her breakfast blood glucose is taken between 6-7 am. Her breakfast blood glucose range is generally 140-180 mg/dl. Her lunch blood glucose is taken between 12-1 pm. Her dinner blood glucose is taken between 6-7 pm. Her dinner blood glucose range is generally 140-180 mg/dl. Her bedtime blood glucose is taken between 8-9 pm. She sees a podiatrist.Eye exam is not current.        Review of Systems   Constitutional:   "Negative for fatigue and weight loss.   Eyes:  Negative for blurred vision.   Respiratory:  Negative for cough, shortness of breath and wheezing.    Gastrointestinal:  Positive for constipation. Negative for diarrhea, nausea and vomiting.   Endocrine:        BS AT HOME 140-160  Pt [place] [facility]  On metformin and semaglutide , also for elevated bmi  Needs dose adj today   Genitourinary:  Negative for difficulty urinating and impotence.   Neurological:         New inj  migraine med working well       Objective   /70   Pulse 88   Temp 36.4 °C (97.6 °F)   Resp 20   Ht 1.676 m (5' 6\")   Wt 98.9 kg (218 lb)   LMP 03/16/2023   BMI 35.19 kg/m²     Physical Exam  Vitals and nursing note reviewed.   Constitutional:       General: She is not in acute distress.     Appearance: Normal appearance.   HENT:      Head: Normocephalic and atraumatic.   Cardiovascular:      Rate and Rhythm: Normal rate and regular rhythm.      Heart sounds: Normal heart sounds.   Pulmonary:      Effort: Pulmonary effort is normal.      Breath sounds: Normal breath sounds.   Skin:     General: Skin is warm and dry.   Neurological:      Mental Status: She is alert.         Assessment/Plan   Problem List Items Addressed This Visit             ICD-10-CM    Obesity (BMI 30-39.9) E66.9     Semaglutide increased today  Will need monthly wt checks with nurse to monitor effectiveness and dose adjustments as needed  F/up 1 mo         Relevant Medications    semaglutide 0.25 mg or 0.5 mg (2 mg/3 mL) pen injector (Start on 8/11/2024)    Other Relevant Orders    Follow Up In [facility] - PCP - Established    Diabetes mellitus (Multi) - Primary E11.9     Pt on metformin and semaglutide  [place] [facility]  Will increase semaglutide to 0.5 mg weekly for wt loss  F/up 3 mo         Relevant Medications    semaglutide 0.25 mg or 0.5 mg (2 mg/3 mL) pen injector (Start on 8/11/2024)    Other Relevant Orders    Hemoglobin A1c    Follow Up In [facility] [facility] - PCP - Established

## 2024-08-09 NOTE — ASSESSMENT & PLAN NOTE
Semaglutide increased today  Will need monthly wt checks with nurse to monitor effectiveness and dose adjustments as needed  F/up 1 mo

## 2024-08-10 ENCOUNTER — PHARMACY VISIT (OUTPATIENT)
Dept: PHARMACY | Facility: CLINIC | Age: 44
End: 2024-08-10
Payer: COMMERCIAL

## 2024-08-12 ENCOUNTER — APPOINTMENT (OUTPATIENT)
Dept: INTEGRATIVE MEDICINE | Facility: CLINIC | Age: 44
End: 2024-08-12
Payer: COMMERCIAL

## 2024-08-12 DIAGNOSIS — N80.9 ENDOMETRIOSIS: ICD-10-CM

## 2024-08-12 DIAGNOSIS — E66.9 OBESITY (BMI 30-39.9): Primary | ICD-10-CM

## 2024-08-12 DIAGNOSIS — R53.82 CHRONIC FATIGUE: ICD-10-CM

## 2024-08-12 DIAGNOSIS — E55.9 VITAMIN D DEFICIENCY: ICD-10-CM

## 2024-08-12 PROCEDURE — 3048F LDL-C <100 MG/DL: CPT | Performed by: INTERNAL MEDICINE

## 2024-08-12 PROCEDURE — 3061F NEG MICROALBUMINURIA REV: CPT | Performed by: INTERNAL MEDICINE

## 2024-08-12 PROCEDURE — 99215 OFFICE O/P EST HI 40 MIN: CPT | Performed by: INTERNAL MEDICINE

## 2024-08-12 PROCEDURE — 3044F HG A1C LEVEL LT 7.0%: CPT | Performed by: INTERNAL MEDICINE

## 2024-08-12 ASSESSMENT — ENCOUNTER SYMPTOMS
FATIGUE: 1
CONSTIPATION: 1
ABDOMINAL PAIN: 0
DIARRHEA: 0
HEADACHES: 1

## 2024-08-12 NOTE — ASSESSMENT & PLAN NOTE
Had very brief visit and did not explore whether she has been screened for sleep apnea. Suggest continue to discuss diet and optimize diet. She has started eating beans in her diet which is fantastic. Plan to speak about calorie density at next visit.

## 2024-08-12 NOTE — ASSESSMENT & PLAN NOTE
Had hysterectomy and does not complain of pain at this time. Would like to explore food choices however.

## 2024-08-12 NOTE — ASSESSMENT & PLAN NOTE
Suggest separate vitamin b12 supplement to see if this improves her low energy. Discussed sleep. She wakes very early in the morning. Wonder if there is some way to adjust her schedule and sleep just a bit later or go to bed just a little bit earlier. Seems difficult though from discussing with her.

## 2024-08-12 NOTE — PATIENT INSTRUCTIONS
Continue to be careful with giving yourself time to sleep. At least 8 hours.   Recommend trying vitamin b12 1000 mcg as sublingual tablet in the morning. Cyanocobalamin daily.   Try prunes daily to help with constipation.  Keep on growing the exercise  Follow up 2 weeks to discuss diet more.   Rosalba Pedroza MD PhD

## 2024-08-12 NOTE — PROGRESS NOTES
"Integrative Medicine Follow-up Visit : (patient of Isabel) first visit for me.     Subjective   Patient ID: Caprice Cowart is a 44 y.o. female who presents for Fatigue       HPI  Hysterectomy last year for endometriosis. Still has some endometriosis on her colon and rectum but denies pain from that. Had \"stage 4\" endometriosis. Surgery took a lot of \"life\" out of her. Struggling with fatigue and migraines.   Recently started a amovig for her headaches.  Since starting the amovig and had no migraines this month and had 10 migraines last monght prior to starting.   She was working on wellness goals with Isabel. Migraines caused her to have a lot of weakness and fatigue.   In terms of health she was trying to incorporate some exercise. Did not want to overdue it. Started walking a bit more. She looked into bands but did not start this. Did get the bands.   Continues to struggle with fatigue but it has improved with acupuncture and sleeping better. Gets between 6-8 hours of sleep. Has a weird scheduled. She shares a car with her . Wakes at 4 am to take him to work. Goes to bed around 8 or 8:30 am. She does not get off work til 4:30 pm. Has household things to do and then has to get in bed.     She mostly talked about increasing protein in her diet. She has low iron saturation. She takes a prenatal pill. Regular iron tablets made her nauseated.  Cannot eat large portions of meat. She has been working on more beans and lentils. Gets beans and lentils 2x per week. Eats them as a side or adds them to salad. Will make a taco salad with them.   Suffers from constipation. Tries fiber supplement but then gets diarrhea.        Pain:frequent headaches    Review of Systems   Constitutional:  Positive for fatigue.   Gastrointestinal:  Positive for constipation. Negative for abdominal pain and diarrhea.   Neurological:  Positive for headaches.                  Objective   LMP 03/16/2023     Physical Exam  Constitutional:     "   General: She is not in acute distress.     Appearance: She is not ill-appearing, toxic-appearing or diaphoretic.   Pulmonary:      Effort: Pulmonary effort is normal. No respiratory distress.   Musculoskeletal:         General: No deformity.      Cervical back: Normal range of motion.      Comments: Upper extremities normal range of motion grossly   Skin:     Coloration: Skin is not jaundiced or pale.      Findings: No rash.   Psychiatric:         Mood and Affect: Mood normal.         Behavior: Behavior normal.                      Assessment/Plan     Problem List Items Addressed This Visit             ICD-10-CM    Endometriosis N80.9     Had hysterectomy and does not complain of pain at this time. Would like to explore food choices however.          Vitamin D deficiency E55.9     Continue supplementation.          Obesity (BMI 30-39.9) - Primary E66.9     Had very brief visit and did not explore whether she has been screened for sleep apnea. Suggest continue to discuss diet and optimize diet. She has started eating beans in her diet which is fantastic. Plan to speak about calorie density at next visit.          Chronic fatigue R53.82     Suggest separate vitamin b12 supplement to see if this improves her low energy. Discussed sleep. She wakes very early in the morning. Wonder if there is some way to adjust her schedule and sleep just a bit later or go to bed just a little bit earlier. Seems difficult though from discussing with her.               Recommend Follow up in : 2-4 weeks to get to know her better.     Rosalba Pedroza MD PhD    Time Spent  Prep time on day of patient encounter: 5 minutes  Time spent directly with patient, family or caregiver: 30 minutes  Additional Time Spent on Patient Care Activities: 10 minutes (chart review to get to know her.)  Documentation Time: 5 minutes  Other Time Spent: 0 minutes  Total: 50 minutes

## 2024-08-18 DIAGNOSIS — E78.1 HIGH TRIGLYCERIDES: ICD-10-CM

## 2024-08-19 ENCOUNTER — APPOINTMENT (OUTPATIENT)
Dept: INTEGRATIVE MEDICINE | Facility: CLINIC | Age: 44
End: 2024-08-19
Payer: COMMERCIAL

## 2024-08-19 DIAGNOSIS — G47.00 INSOMNIA: ICD-10-CM

## 2024-08-19 DIAGNOSIS — G43.009 MIGRAINE WITHOUT AURA AND WITHOUT STATUS MIGRAINOSUS, NOT INTRACTABLE: Primary | ICD-10-CM

## 2024-08-19 PROCEDURE — RXMED WILLOW AMBULATORY MEDICATION CHARGE

## 2024-08-19 PROCEDURE — 97811 ACUP 1/> W/O ESTIM EA ADD 15: CPT | Performed by: ACUPUNCTURIST

## 2024-08-19 PROCEDURE — 97810 ACUP 1/> WO ESTIM 1ST 15 MIN: CPT | Performed by: ACUPUNCTURIST

## 2024-08-20 PROCEDURE — RXMED WILLOW AMBULATORY MEDICATION CHARGE

## 2024-08-20 ASSESSMENT — ENCOUNTER SYMPTOMS
VOMITING: 1
NECK PAIN: 1
NAUSEA: 1
HEADACHES: 1

## 2024-08-20 NOTE — PROGRESS NOTES
"Acupuncture Visit:     Please note: Dictation software was used while completing this note and may contain spelling, grammatical, and/or syntax errors.  Please contact me with any questions.    To clinicians, I am always happy to partner with you in the care of your patients. Do not hesitate to call the office or contact me directly regarding any further consultations or with questions regarding the plan of care outlined or patient progress.    Subjective   Patient ID: Caprice Cowart is a 44 y.o. female who presents for Migraine    Insurance visit 10 of 20    Patient continues to note improvement in her migraine since starting Aimovig injections    She notes an increase in insomnia, as well as digestive issues with semaglutide       Initial intake (02/14/2024 MQ)    Pt came in today seeking treatment for chronic neck pain and migraine; she was referred by Isabel Padilla PA-C ([facility])    Patient stated that she has had migraines since her early 20s, and they became worse after her hysterectomy; she has not had any migraines since Christmas, taking both Ubrelvy and Topamax; her migraines are generally bilaterally temporal, lasting hours to days, accompanied by noise sensitivity, nausea, and vomiting; patient also gets tension headaches from neck pain    Patient had a hysterectomy 4/2023 due to stage IV endometriosis, noting that she does not feel like she has \"bounced back\" like she expected; she is also experiencing fatigue, noting that she is recovering from COVID    Migraine   This is a chronic problem. The problem has been gradually improving. The pain is located in the Temporal region. Associated symptoms include nausea, neck pain, phonophobia and vomiting.   Neck Pain   This is a chronic problem. Associated symptoms include headaches.       Session Information  Is this acupuncture treatment being billed to the patient's insurance company: Yes  This is visit number: 10  The patient has a total number of visits " of: 20  Last Treatment date: 08/05/24  Name of Insurance Company:  Employee Medical Plan  Visit Type: Follow-up visit  Medical History Reviewed: I have reviewed pertinent medical history in EHR, and no contraindications are present to provide treatment  Since Last Visit, Patient Noted Worsening: Sleep disturbance         Review of Systems   Gastrointestinal:  Positive for nausea and vomiting.   Musculoskeletal:  Positive for neck pain.   Neurological:  Positive for headaches.            Provider reviewed plan for the acupuncture session, precautions and contraindications. Patient/guardian/hospital staff has given consent to treat with full understanding of what to expect during the session. Before acupuncture began, provider explained to the patient to communicate at any time if the procedure was causing discomfort past their tolerance level. Patient agreed to advise acupuncturist. The acupuncturist counseled the patient on the risks of acupuncture treatment including pain, infection, bleeding, and no relief of pain. The patient was positioned comfortably. There was no evidence of infection at the site of needle insertions.    Objective   Physical Exam         Treatment Plan  Treatment Goals: Wellbeing improvement, Stress reduction, Fatigue reduction    Acupuncture Treatment  Patient Position: Supine on a table  Needle Guage: 40 guage /.16/ Red seirin, 42 guage /.14/ Lime green seirin  Body Points - Bilateral: KD7, KD10, LIV3, ST Qi line x3, (YT, DU24)  Other Techniques Utilized: TDP Lamp  TDP Lamp Descripton: Feet, 25min  Needle Count In: 14  Needle Count Out: 14  Needle Retention Time (min): 25 minutes  Total Face to Face Time (min): 20 minutes              Assessment/Plan

## 2024-08-21 ENCOUNTER — PHARMACY VISIT (OUTPATIENT)
Dept: PHARMACY | Facility: CLINIC | Age: 44
End: 2024-08-21
Payer: COMMERCIAL

## 2024-08-22 ENCOUNTER — PHARMACY VISIT (OUTPATIENT)
Dept: PHARMACY | Facility: CLINIC | Age: 44
End: 2024-08-22
Payer: COMMERCIAL

## 2024-08-22 PROCEDURE — RXMED WILLOW AMBULATORY MEDICATION CHARGE

## 2024-08-22 RX ORDER — ATORVASTATIN CALCIUM 40 MG/1
40 TABLET, FILM COATED ORAL DAILY
Qty: 90 TABLET | Refills: 0 | Status: SHIPPED | OUTPATIENT
Start: 2024-08-22 | End: 2025-08-17

## 2024-08-22 NOTE — TELEPHONE ENCOUNTER
Pt last OV 8/9/24    Requested Prescriptions     Pending Prescriptions Disp Refills    atorvastatin (Lipitor) 40 mg tablet 90 tablet 0     Sig: Take 1 tablet (40 mg) by mouth once daily.

## 2024-08-23 ENCOUNTER — PHARMACY VISIT (OUTPATIENT)
Dept: PHARMACY | Facility: CLINIC | Age: 44
End: 2024-08-23
Payer: COMMERCIAL

## 2024-08-26 ENCOUNTER — APPOINTMENT (OUTPATIENT)
Dept: INTEGRATIVE MEDICINE | Facility: CLINIC | Age: 44
End: 2024-08-26
Payer: COMMERCIAL

## 2024-08-31 PROCEDURE — RXMED WILLOW AMBULATORY MEDICATION CHARGE

## 2024-09-04 ENCOUNTER — APPOINTMENT (OUTPATIENT)
Dept: INTEGRATIVE MEDICINE | Facility: CLINIC | Age: 44
End: 2024-09-04
Payer: COMMERCIAL

## 2024-09-04 ENCOUNTER — PHARMACY VISIT (OUTPATIENT)
Dept: PHARMACY | Facility: CLINIC | Age: 44
End: 2024-09-04
Payer: COMMERCIAL

## 2024-09-06 ENCOUNTER — APPOINTMENT (OUTPATIENT)
Dept: PRIMARY CARE | Facility: CLINIC | Age: 44
End: 2024-09-06
Payer: COMMERCIAL

## 2024-09-14 PROCEDURE — RXMED WILLOW AMBULATORY MEDICATION CHARGE

## 2024-09-19 ENCOUNTER — PHARMACY VISIT (OUTPATIENT)
Dept: PHARMACY | Facility: CLINIC | Age: 44
End: 2024-09-19
Payer: COMMERCIAL

## 2024-09-24 DIAGNOSIS — K21.9 GASTROESOPHAGEAL REFLUX DISEASE WITHOUT ESOPHAGITIS: ICD-10-CM

## 2024-09-25 DIAGNOSIS — E11.9 TYPE 2 DIABETES MELLITUS WITHOUT COMPLICATION, WITHOUT LONG-TERM CURRENT USE OF INSULIN (MULTI): ICD-10-CM

## 2024-09-25 DIAGNOSIS — E66.9 OBESITY (BMI 30-39.9): ICD-10-CM

## 2024-09-25 PROCEDURE — RXMED WILLOW AMBULATORY MEDICATION CHARGE

## 2024-09-25 RX ORDER — SEMAGLUTIDE 0.68 MG/ML
0.5 INJECTION, SOLUTION SUBCUTANEOUS
Qty: 3 ML | Refills: 1 | Status: SHIPPED | OUTPATIENT
Start: 2024-09-29

## 2024-09-26 PROCEDURE — RXMED WILLOW AMBULATORY MEDICATION CHARGE

## 2024-09-26 RX ORDER — OMEPRAZOLE 20 MG/1
20 CAPSULE, DELAYED RELEASE ORAL DAILY
Qty: 90 CAPSULE | Refills: 3 | Status: SHIPPED | OUTPATIENT
Start: 2024-09-26 | End: 2025-09-26

## 2024-09-27 ENCOUNTER — OFFICE VISIT (OUTPATIENT)
Dept: PRIMARY CARE | Facility: CLINIC | Age: 44
End: 2024-09-27
Payer: COMMERCIAL

## 2024-09-27 ENCOUNTER — PHARMACY VISIT (OUTPATIENT)
Dept: PHARMACY | Facility: CLINIC | Age: 44
End: 2024-09-27
Payer: COMMERCIAL

## 2024-09-27 VITALS
DIASTOLIC BLOOD PRESSURE: 90 MMHG | HEART RATE: 101 BPM | WEIGHT: 219 LBS | SYSTOLIC BLOOD PRESSURE: 154 MMHG | OXYGEN SATURATION: 98 % | RESPIRATION RATE: 16 BRPM | BODY MASS INDEX: 35.35 KG/M2 | TEMPERATURE: 98.3 F

## 2024-09-27 DIAGNOSIS — G43.009 MIGRAINE WITHOUT AURA AND WITHOUT STATUS MIGRAINOSUS, NOT INTRACTABLE: Primary | ICD-10-CM

## 2024-09-27 RX ORDER — KETOROLAC TROMETHAMINE 30 MG/ML
60 INJECTION, SOLUTION INTRAMUSCULAR; INTRAVENOUS ONCE
Status: COMPLETED | OUTPATIENT
Start: 2024-09-27 | End: 2024-09-27

## 2024-09-27 ASSESSMENT — ENCOUNTER SYMPTOMS
VOMITING: 1
PHOTOPHOBIA: 1
FATIGUE: 1
NAUSEA: 1
CARDIOVASCULAR NEGATIVE: 1
LIGHT-HEADEDNESS: 0
HEADACHES: 1
DIZZINESS: 1
RESPIRATORY NEGATIVE: 1
NUMBNESS: 0
APPETITE CHANGE: 1

## 2024-09-27 NOTE — PROGRESS NOTES
Subjective   Caprice Cowart is a 44 y.o. female who presents for Migraine.  Patient has a history of migraines since her 20s. Patient is currently being seen by neurology and is on medications for migraine treatment but in the past year, her migraines have been worse. Pt's current migraine started on Monday night with right sided sharp pain and throbbing. Sensitivity to light. Pt has nausea and vomiting (Tuesday). Pt took zofran and it helped. It goes away but will come back in 4-6 hours with use of tylenol, advil or excedrin. Not the worst headache of her life. Consistent with her previous headaches.     Review of Systems   Constitutional:  Positive for appetite change and fatigue.   HENT: Negative.     Eyes:  Positive for photophobia. Negative for visual disturbance.   Respiratory: Negative.     Cardiovascular: Negative.    Gastrointestinal:  Positive for nausea and vomiting.   Neurological:  Positive for dizziness and headaches. Negative for light-headedness and numbness.     Visit Vitals  /90   Pulse 101   Temp 36.8 °C (98.3 °F)   Resp 16   Wt 99.3 kg (219 lb)   LMP 03/16/2023   SpO2 98%   BMI 35.35 kg/m²   OB Status Hysterectomy   Smoking Status Former   BSA 2.15 m²      Physical Exam  Vitals reviewed.   Constitutional:       General: She is not in acute distress.     Appearance: Normal appearance. She is not toxic-appearing.   HENT:      Head: Atraumatic.   Eyes:      Extraocular Movements: Extraocular movements intact.      Conjunctiva/sclera: Conjunctivae normal.      Pupils: Pupils are equal, round, and reactive to light.   Cardiovascular:      Rate and Rhythm: Normal rate and regular rhythm.      Heart sounds: Normal heart sounds. No murmur heard.  Pulmonary:      Breath sounds: Normal breath sounds. No wheezing or rhonchi.   Musculoskeletal:         General: Normal range of motion.   Skin:     General: Skin is warm and dry.   Neurological:      General: No focal deficit present.      Mental  Status: She is alert.      Cranial Nerves: No cranial nerve deficit.      Comments: Strength against resistance upper extremities and  strength 5/5 bilaterally   Psychiatric:         Mood and Affect: Mood normal.         Assessment/Plan   Problem List Items Addressed This Visit       Migraine without aura and without status migrainosus, not intractable - Primary    Relevant Medications    ketorolac (Toradol) injection 60 mg (Start on 9/27/2024  2:00 PM)     Patient given IM toradol in the office and notes improvement. She will see neurology on Monday and will keep that appt. Advised that patient is to go to the ER for any worsening headache, photophobia, weakness, change in vision or new/concerning symptoms; she agreed.

## 2024-09-30 ENCOUNTER — APPOINTMENT (OUTPATIENT)
Dept: NEUROLOGY | Facility: CLINIC | Age: 44
End: 2024-09-30
Payer: COMMERCIAL

## 2024-09-30 ENCOUNTER — PHARMACY VISIT (OUTPATIENT)
Dept: PHARMACY | Facility: CLINIC | Age: 44
End: 2024-09-30
Payer: COMMERCIAL

## 2024-09-30 VITALS — SYSTOLIC BLOOD PRESSURE: 122 MMHG | TEMPERATURE: 97.6 F | HEART RATE: 80 BPM | DIASTOLIC BLOOD PRESSURE: 78 MMHG

## 2024-09-30 DIAGNOSIS — G43.711 INTRACTABLE CHRONIC MIGRAINE WITHOUT AURA AND WITH STATUS MIGRAINOSUS: Primary | ICD-10-CM

## 2024-09-30 PROCEDURE — 3048F LDL-C <100 MG/DL: CPT

## 2024-09-30 PROCEDURE — 3078F DIAST BP <80 MM HG: CPT

## 2024-09-30 PROCEDURE — RXMED WILLOW AMBULATORY MEDICATION CHARGE

## 2024-09-30 PROCEDURE — 3044F HG A1C LEVEL LT 7.0%: CPT

## 2024-09-30 PROCEDURE — 3074F SYST BP LT 130 MM HG: CPT

## 2024-09-30 PROCEDURE — 99205 OFFICE O/P NEW HI 60 MIN: CPT

## 2024-09-30 PROCEDURE — 3061F NEG MICROALBUMINURIA REV: CPT

## 2024-09-30 RX ORDER — KETOROLAC TROMETHAMINE 10 MG/1
10 TABLET, FILM COATED ORAL EVERY 6 HOURS PRN
Qty: 20 TABLET | Refills: 0 | Status: SHIPPED | OUTPATIENT
Start: 2024-09-30 | End: 2024-10-05

## 2024-09-30 RX ORDER — ZAVEGEPANT 10 MG/.1ML
10 SPRAY NASAL DAILY PRN
Qty: 6 EACH | Refills: 11 | Status: SHIPPED | OUTPATIENT
Start: 2024-09-30

## 2024-09-30 ASSESSMENT — PAIN SCALES - GENERAL: PAINLEVEL: 3

## 2024-09-30 NOTE — PROGRESS NOTES
Chief Complaint   Patient presents with    New Patient Visit    Migraine     Started about a year and half ago. Had them in early 20s but they went away and are now back. Aimovig for prevention and Ubrelvy for abortive treatment. Had a migraine for 4 days last week and had to go get a Toradol injection. States it helped. Has been getting them 3-4 days a week if not more. N&V during an attack. Along with noise and light sensitivity and fatigue and weakness. Sometimes dizziness. Ubrelvy is hit or miss when it works. Tried Nurtec and states she did not think that helped either.       Subjective     HPI  Caprice Cowart is a 44 y.o. year old female who presents with chief complaint Intractable chronic migraine without aura and with status migrainosus [G43.711]. PMH significant for type 2 diabetes, GERD, eczema, and allergic rhinitis. She states she had mild migraines in her 20s, but more intense now, notably after her hysterectomy (4/2023).     Aimovig was last done on Tues 8/24/2024.    Caprice  states her headaches gradually worsening in frequency and severity. General headaches occurring 10 days per month, migraines sometimes 4 days per week.   Currently having 26 HA days per month.   The headaches are usually sharp and tingling pain  and are located right parietal/ occipital, generally unilateral. On the right side. Radiates down into the right side of the neck, sometimes to the right temple. She recalls one occasion of left side pain/ HA.  The patient rates the most severe headaches a 8 in intensity.   Generally, migraines last about 4 hours in duration when treated with Ubrelvy (She sometimes has to repeat Ubrelvy). Also using ibuprofen/ Excedrin migraine up to 5 days per week.   Associated nausea, phonophobia, vestibular symptoms, and fatigue/weakness .   Headaches are worsened with exertion.   Triggers include barometric pressure  and stress.  Work attendance or other daily activities affected: yes, missing a  "lot of work.  Caffeine: 1 -2 times per day.  Sleep: \"terrible\" difficulty getting to sleep. Is able to stay asleep. Snoring. Previously tested for IRENE- 2020/negative.  Denies head or neck injuries, but maybe a concussion as a kid in sports.     Medications  Preventive:  Aimovig- has been ineffective to reduce migraines (treatment x 2 months)  duloxetine  Topamax- made it to 50mg- foggy/ forgetful  Nurtec    Rescue:  Excedrin  Tylenol  advil  Nurtec- not helpful  Ubrelvy  Treximet- had angina  Midrin  relpax    Current Outpatient Medications:     Accu-Chek Guide Me Glucose Mtr misc, Use to test blood sugars as directed twice daily, Disp: , Rfl:     atorvastatin (Lipitor) 40 mg tablet, Take 1 tablet (40 mg) by mouth once daily., Disp: 90 tablet, Rfl: 0    blood sugar diagnostic (Blood Glucose Test) strip, TEST twice a day, Disp: , Rfl:     budesonide (Rhinocort AQ) 32 mcg/actuation nasal spray, Administer 1 spray into each nostril once daily., Disp: 8.43 mL, Rfl: 3    cholecalciferol (Vitamin D3) 5,000 Units tablet, Take 1 tablet (5,000 Units) by mouth once daily., Disp: , Rfl:     DULoxetine (Cymbalta) 60 mg DR capsule, Take 1 capsule (60 mg) by mouth once daily., Disp: 90 capsule, Rfl: 3    erenumab (Aimovig Autoinjector) 70 mg/mL injection, Inject 1 mL (70 mg) under the skin every 28 (twenty-eight) days., Disp: 1 mL, Rfl: 2    fexofenadine (Allegra) 180 mg tablet, Take 1 tablet (180 mg) by mouth once daily., Disp: 90 tablet, Rfl: 3    metFORMIN (Glucophage) 500 mg tablet, Take 2 tablets (1,000 mg) by mouth 2 times daily (morning and late afternoon)., Disp: 360 tablet, Rfl: 1    metoprolol succinate XL (Toprol-XL) 25 mg 24 hr tablet, Take 1 tablet (25 mg) by mouth once daily. Do not crush or chew., Disp: 90 tablet, Rfl: 3    montelukast (Singulair) 10 mg tablet, Take 1 tablet (10 mg) by mouth once daily., Disp: 90 tablet, Rfl: 3    multivitamin tablet, Take by mouth., Disp: , Rfl:     omeprazole (PriLOSEC) 20 mg DR" capsule, Take 1 capsule (20 mg) by mouth once daily., Disp: 90 capsule, Rfl: 3    semaglutide (Ozempic) 0.25 mg or 0.5 mg (2 mg/3 mL) pen injector, Inject 0.5 mg under the skin 1 (one) time per week., Disp: 3 mL, Rfl: 1    ubrogepant 50 mg tablet, Take 1 tablet (50 mg) by mouth at onset of acute breakthrough migraine. May repeat in 2 hours if needed., Disp: 16 tablet, Rfl: 1    fremanezumab (Ajovy) 225 mg/1.5 mL auto-injector, Inject 1 Pen (225 mg) under the skin every 28 (twenty-eight) days., Disp: 3 each, Rfl: 3    ketorolac (Toradol) 10 mg tablet, Take 1 tablet (10 mg) by mouth every 6 hours if needed for moderate pain (4 - 6) for up to 5 days., Disp: 20 tablet, Rfl: 0    zavegepant (Zavzpret) 10 mg/actuation spray,non-aerosol, Administer 10 mg into affected nostril(s) once daily as needed (migraine, no more than 1 device per 24 hours)., Disp: 6 each, Rfl: 11    Past Medical History:   Diagnosis Date    Abnormal Pap smear of cervix     Abnormal uterine bleeding (AUB) 02/07/2023    Acute maxillary sinusitis 02/07/2023    Acute upper respiratory infection, unspecified 11/20/2019    Acute URI    Allergic     Allergy status to unspecified drugs, medicaments and biological substances     History of seasonal allergies    Anemia     Anxiety     Body mass index (BMI) 34.0-34.9, adult 01/08/2021    Body mass index (BMI) of 34.0 to 34.9 in adult    Depression     Diabetes mellitus (Multi)     Eczema     Elevated heart rate with elevated blood pressure without diagnosis of hypertension 02/07/2023    Excessive and frequent menstruation with regular cycle 04/02/2020    Spotting    Female pelvic pain 02/07/2023    Flank pain 02/07/2023    GERD (gastroesophageal reflux disease)     Headache     HPV (human papilloma virus) infection     Hypertension     Menorrhagia with regular cycle 02/07/2023    Migraine Years    Other abnormal glucose 01/08/2021    Elevated glucose    Personal history of other diseases of the digestive system      History of irritable bowel syndrome    Personal history of other diseases of the digestive system     History of esophageal reflux    Personal history of other diseases of the nervous system and sense organs     History of migraine    Personal history of other endocrine, nutritional and metabolic disease 07/08/2020    History of obesity    Personal history of other infectious and parasitic diseases 10/28/2019    History of viral infection    Personal history of other specified conditions 10/28/2019    History of fever    Rash and other nonspecific skin eruption 07/20/2020    Rash    Urinary tract infection, site not specified 05/30/2020    Acute UTI (urinary tract infection)    Visual impairment     Vitamin D deficiency, unspecified     Vitamin D deficiency     Past Surgical History:   Procedure Laterality Date    ADENOIDECTOMY      APPENDECTOMY  8/8/2022    HYSTERECTOMY  4/4/2023    KNEE SURGERY  08/15/2016    Knee Surgery    LAPAROSCOPIC HYSTERECTOMY      WITHOUT BILATERAL OOPHORECTOMY    LAPAROSCOPY DIAGNOSTIC / BIOPSY / ASPIRATION / LYSIS  10/14/2022    Exploratory Laparoscopy    OTHER SURGICAL HISTORY  08/15/2016    Sigmoidoscopy (Fiberoptic, Therapeutic )    OTHER SURGICAL HISTORY  08/23/2022    Laparoscopy    OTHER SURGICAL HISTORY  08/23/2022    Appendectomy laparoscopic    OTHER SURGICAL HISTORY  08/23/2022    Presacral neurectomy    TONSILLECTOMY  08/15/2016    Tonsillectomy     Family History   Problem Relation Name Age of Onset    Diabetes Mother Yoko     Kidney failure Mother Yoko     Heart failure Mother Yoko     Arthritis Mother Yoko     Asthma Mother Yoko     Depression Mother Yoko     Hearing loss Mother Yoko     Hyperlipidemia Mother Yoko     Kidney disease Mother Yoko     Vision loss Mother Yoko     Other (ATHEROSCLEROSIS) Father Hector     Other (CABG) Father Hector     COPD Father Hector     Coronary artery disease Father Hector     Diabetes Father Hector      Hypertension Father Hector     Atrial fibrillation Father Hector     Alcohol abuse Father Hector     Hearing loss Father Hector     Heart disease Father Hector     Hyperlipidemia Father Hector     Cancer Father Hector     Colon cancer Paternal Grandmother Lashanda     Alcohol abuse Brother Lionel     Alcohol abuse Brother Hector II     Alcohol abuse Brother Figueroa     Drug abuse Brother Figueroa     Arthritis Mother's Sister Lyndia     Asthma Mother's Sister Lyndia     Hearing loss Mother's Sister Lyndia     Kidney disease Mother's Sister Lyndia      Social History     Tobacco Use    Smoking status: Former     Current packs/day: 1.00     Types: Cigarettes     Passive exposure: Past    Smokeless tobacco: Never   Substance Use Topics    Alcohol use: Yes     Comment: Rare     Social History     Substance and Sexual Activity   Drug Use Never        ROS  As noted in HPI, otherwise all other systems have been reviewed are negative for complaint.     Objective   General Appearance: Caprice is well-developed, well-nourished, 44 y.o. year old female, in no acute distress. Makes good eye contact, is alert, interactive, and cooperative. Demonstrates recent & remote memory recall. Subjective information consistent with objective assessment.     Vitals:    09/30/24 1301   BP: 122/78   Pulse: 80   Temp: 36.4 °C (97.6 °F)   PainSc:   3   LMP: 03/16/2023       Lab Results   Component Value Date    WBC 11.7 (H) 04/04/2024    RBC 4.59 04/04/2024    HGB 12.8 04/04/2024    HCT 40.1 04/04/2024     04/04/2024     01/12/2024    K 4.1 01/12/2024     01/12/2024    BUN 15 01/12/2024    CREATININE 0.68 01/12/2024    EGFR >90 01/12/2024    CALCIUM 9.2 01/12/2024    ALKPHOS 131 (H) 07/14/2023    AST 14 07/14/2023    ALT 21 07/14/2023    OMQAINBN01 605 04/04/2024    VITD25 62 04/04/2024    HGBA1C 6.6 (H) 08/09/2024    LDLCALC 51 08/09/2024    CHOL 145 08/09/2024    HDL 61.3 08/09/2024    TRIG 162 (H) 08/09/2024    TSH 1.80  05/15/2024        Eye Exam  OPHTHALMOSCOPIC:   The ophthalmoscopic exam was normal. The fundi were well visualized with normal disc margins, clear vessels and vascular pulsations. No disc edema. The cup/disk ratio was not enlarged. No hemorrhages or exudates were present in the posterior segments that were visualized.     Neurological Exam    Cranial Nerves  CN III, IV, VI: Extraocular movements intact bilaterally. Normal lids and orbits bilaterally.  CN VII: Full and symmetric facial movement.  CN VIII: Hearing is normal.    Assessment & Plan  Intractable chronic migraine without aura and with status migrainosus    Orders:    fremanezumab (Ajovy) 225 mg/1.5 mL auto-injector; Inject 1 Pen (225 mg) under the skin every 28 (twenty-eight) days.    zavegepant (Zavzpret) 10 mg/actuation spray,non-aerosol; Administer 10 mg into affected nostril(s) once daily as needed (migraine, no more than 1 device per 24 hours).    ketorolac (Toradol) 10 mg tablet; Take 1 tablet (10 mg) by mouth every 6 hours if needed for moderate pain (4 - 6) for up to 5 days.       ASSESSMENT/PLAN:  Discontinue Aimovig  Ajovy sample self-administered today in-clinic.  Zavzpret sample provided today- may be more effective rescue treatment than PO options  Follow up in 6 weeks to determine efficacy of treatment.     Seble Agee, APRN-CNP

## 2024-09-30 NOTE — PATIENT INSTRUCTIONS
Dear Caprice,    Thank you for coming to [facility]. It was a pleasure caring for you today.  The best way to contact me for medication refills or questions about your care is through [employer].  Otherwise, you can contact the office by phone: [phone].    Seble Agee, APRN-CNP    Migraines:    Suggestions for preventing or controlling migraines:  Riboflavin (vitamin B2) 200 mg twice a day may be helpful. Side effects can include diarrhea, increased urination and bright yellow urine. This should not be taken by kids.   CoQ10 100 mg daily up to three times per day may also be helpful. Side effects can include nausea, diarrhea, and dyspepsia.   Magnesium (oxelate) 400- 600 mg daily for prevention. Magnesium can also help with menstrual migraines. Side effects can include diarrhea and flushing.   According to the American Academy of Neurology, there is not sufficient evidence that melatonin helps prevent or treat migraines, so it is not currently recommended for this use. Butterbur is also not currently recommended for migraine prevention as it can cause liver toxicity.   Avoid triggers that can cause or worsen migraines (food, lack of sleep, stress, etc.)  Headache frequency is noted to be increased in those with low physical activity, poor sleep, high BMI, smoking, and caffeine overuse. Managing stress with relaxation techniques and getting 20-30 minutes of aerobic exercise at least 4 times per week can help decrease headache frequency and intensity.   Keep a diary of your headaches to note triggers, how often you get them, how long they last, associated symptoms, what medicines you took and if they helped, and any other helpful information   Avoid taking rescue medication (NOT preventative medication) more than 3 days a week (includes both prescription and over the counter meds)  Take your preventative medication as directed. Let me know if you have side effects or problems with  the medication. Do not suddenly stop the medication.     Medication Overuse Headaches:  Medication-overuse headache may occur in people who have frequent migraine, cluster, or tension-type headaches, which leads them to overuse pain medications. A vicious cycle occurs in which frequent headaches cause the person to take medication frequently (often non-prescription medication), which then causes a rebound headache as the medication wears off, causing more medication use, and so on. Medication overuse headache can even occur if a person is taking daily analgesics to treat other areas of pain, as the body does not care why the person is taking the medication. This headache from frequent medication use is a result of the body's dependence on the medication.    Medication overuse headache: Using combination analgesics, opioids or triptans >= 10 days/month, or acetaminophen/ or NSAIDs >= 15 days/month        Overuse of any number of pain medications can increase the risk of developing medication-overuse headaches. To avoid medication-overuse headaches, we recommend the following:  If possible, avoid medications that contain butalbital (sample brand name: Fioricet) and opioids completely.  Do not use triptans or aspirin/acetaminophen/caffeine combinations (sample brand name: Excedrin) more than nine days per month.  Do not use nonsteroidal antiinflammatory drugs (NSAIDs) more than 14 days per month.      *If you are having difficulty breaking the cycle of medication overuse headaches, please message me through [employer] or call the office.

## 2024-10-01 ENCOUNTER — PHARMACY VISIT (OUTPATIENT)
Dept: PHARMACY | Facility: CLINIC | Age: 44
End: 2024-10-01
Payer: COMMERCIAL

## 2024-10-02 ENCOUNTER — APPOINTMENT (OUTPATIENT)
Dept: INTEGRATIVE MEDICINE | Facility: CLINIC | Age: 44
End: 2024-10-02
Payer: COMMERCIAL

## 2024-10-10 ENCOUNTER — PHARMACY VISIT (OUTPATIENT)
Dept: PHARMACY | Facility: CLINIC | Age: 44
End: 2024-10-10

## 2024-10-15 ENCOUNTER — TELEPHONE (OUTPATIENT)
Dept: NEUROLOGY | Facility: CLINIC | Age: 44
End: 2024-10-15
Payer: COMMERCIAL

## 2024-10-15 DIAGNOSIS — G43.711 INTRACTABLE CHRONIC MIGRAINE WITHOUT AURA AND WITH STATUS MIGRAINOSUS: ICD-10-CM

## 2024-10-15 RX ORDER — METHYLPREDNISOLONE 4 MG/1
TABLET ORAL
Qty: 21 TABLET | Refills: 0 | Status: SHIPPED | OUTPATIENT
Start: 2024-10-15 | End: 2024-10-22

## 2024-10-15 NOTE — TELEPHONE ENCOUNTER
Patient states her migraines are still bad and are daily. Wants medrol dose pack.  Sending to Marline

## 2024-10-16 ENCOUNTER — OFFICE VISIT (OUTPATIENT)
Dept: PRIMARY CARE | Facility: CLINIC | Age: 44
End: 2024-10-16
Payer: COMMERCIAL

## 2024-10-16 VITALS
BODY MASS INDEX: 34.87 KG/M2 | HEIGHT: 66 IN | SYSTOLIC BLOOD PRESSURE: 104 MMHG | DIASTOLIC BLOOD PRESSURE: 76 MMHG | HEART RATE: 72 BPM | RESPIRATION RATE: 20 BRPM | TEMPERATURE: 97.3 F | WEIGHT: 217 LBS

## 2024-10-16 DIAGNOSIS — F32.0 DEPRESSION, MAJOR, SINGLE EPISODE, MILD (CMS-HCC): Primary | ICD-10-CM

## 2024-10-16 DIAGNOSIS — F41.9 ANXIETY: ICD-10-CM

## 2024-10-16 DIAGNOSIS — G43.009 MIGRAINE WITHOUT AURA AND WITHOUT STATUS MIGRAINOSUS, NOT INTRACTABLE: ICD-10-CM

## 2024-10-16 PROCEDURE — 99214 OFFICE O/P EST MOD 30 MIN: CPT | Performed by: FAMILY MEDICINE

## 2024-10-16 RX ORDER — ARIPIPRAZOLE 2 MG/1
2 TABLET ORAL DAILY
Qty: 30 TABLET | Refills: 1 | Status: SHIPPED | OUTPATIENT
Start: 2024-10-16 | End: 2024-12-15

## 2024-10-16 ASSESSMENT — ENCOUNTER SYMPTOMS
COUGH: 0
DIARRHEA: 1
WHEEZING: 0
BLOOD IN STOOL: 0
DECREASED CONCENTRATION: 1
DIFFICULTY URINATING: 0
FEVER: 0
HEMATURIA: 0
NERVOUS/ANXIOUS: 1
FATIGUE: 1
HEADACHES: 1
SLEEP DISTURBANCE: 1
DYSPHORIC MOOD: 1
CONSTIPATION: 1
NAUSEA: 1
SHORTNESS OF BREATH: 0

## 2024-10-16 NOTE — ASSESSMENT & PLAN NOTE
Pt is cont to see neuro  New meds rx   Pt still with migraine h/a  Has f/up appt scheduled already

## 2024-10-16 NOTE — PROGRESS NOTES
"Subjective   Patient ID: Caprice Cowart is a 44 y.o. female who presents for Follow-up (Pt on Anxiety and Depression meds. She feels burnt out and she keeps getting migraines which is making her depressed. She feels overwhelmed at work and home. She would like to increase or add to her medication. She also has paperwork asking for leave. She hasn't been to work since 10/7/24. She is asking for 4-6wks. ).    HPI     Review of Systems   Constitutional:  Positive for fatigue. Negative for fever.   Respiratory:  Negative for cough, shortness of breath and wheezing.    Gastrointestinal:  Positive for constipation, diarrhea and nausea. Negative for blood in stool.   Genitourinary:  Negative for difficulty urinating and hematuria.   Neurological:  Positive for headaches.        Having flare of migraine h/a's  Neuro /Seble SOTO, next appt 11/8/24  On steroid now, on ajovy and zavzepret   Psychiatric/Behavioral:  Positive for decreased concentration, dysphoric mood and sleep disturbance. Negative for suicidal ideas. The patient is nervous/anxious.         Pt having flare of anxiety and depression this was triggered by worsening migraines  Pt hasn't worked more than 10 d last month  Feels burned out, fatigued, overwhelmed,difficult to concentrate and work  Pt on duloxetine 60  mg   Other meds tried, abilify worked but stopped due to feeling better  Pt requesting  CARMENZA from work , seeing psychologist  Ingrid Romano ,  telemed monthly  and they agreed pt needs a CARMENAZ  Ill need pt to sign med release for notes from psych       Objective   /76   Pulse 72   Temp 36.3 °C (97.3 °F)   Resp 20   Ht 1.676 m (5' 6\")   Wt 98.4 kg (217 lb)   LMP 03/16/2023   BMI 35.02 kg/m²     Physical Exam  Vitals and nursing note reviewed.   Constitutional:       General: She is not in acute distress.     Appearance: Normal appearance.   Cardiovascular:      Rate and Rhythm: Normal rate and regular rhythm.      Heart sounds: " Normal heart sounds.   Pulmonary:      Effort: Pulmonary effort is normal.      Breath sounds: Normal breath sounds.   Skin:     General: Skin is warm and dry.   Neurological:      General: No focal deficit present.      Mental Status: She is alert.   Psychiatric:         Attention and Perception: Attention and perception normal.         Mood and Affect: Mood is depressed. Affect is tearful.         Behavior: Behavior normal. Behavior is cooperative.         Thought Content: Thought content normal.         Assessment/Plan   Problem List Items Addressed This Visit             ICD-10-CM    Anxiety F41.9    Relevant Medications    ARIPiprazole (Abilify) 2 mg tablet    Depression, major, single episode, mild (CMS-HCC) - Primary F32.0     Pt was formerly stable on duloxetine  Recent flare of symptoms  Will add abilify  Pt has f/up appt already  Will need notes from psych , pt to sign med release  Will complete fmla for 4-6 wk         Relevant Medications    ARIPiprazole (Abilify) 2 mg tablet    Migraine without aura and without status migrainosus, not intractable G43.009     Pt is cont to see neuro  New meds rx   Pt still with migraine h/a  Has f/up appt scheduled already

## 2024-10-16 NOTE — ASSESSMENT & PLAN NOTE
Pt was formerly stable on duloxetine  Recent flare of symptoms  Will add abilify  Pt has f/up appt already  Will need notes from psych , pt to sign med release  Will complete fmla for 4-6 wk

## 2024-10-22 ENCOUNTER — APPOINTMENT (OUTPATIENT)
Dept: NEUROLOGY | Facility: CLINIC | Age: 44
End: 2024-10-22
Payer: COMMERCIAL

## 2024-10-23 ENCOUNTER — OFFICE VISIT (OUTPATIENT)
Dept: PRIMARY CARE | Facility: CLINIC | Age: 44
End: 2024-10-23
Payer: COMMERCIAL

## 2024-10-23 ENCOUNTER — APPOINTMENT (OUTPATIENT)
Dept: INTEGRATIVE MEDICINE | Facility: CLINIC | Age: 44
End: 2024-10-23
Payer: COMMERCIAL

## 2024-10-23 VITALS
BODY MASS INDEX: 34.7 KG/M2 | DIASTOLIC BLOOD PRESSURE: 84 MMHG | SYSTOLIC BLOOD PRESSURE: 120 MMHG | HEART RATE: 82 BPM | TEMPERATURE: 97 F | OXYGEN SATURATION: 98 % | WEIGHT: 215 LBS | RESPIRATION RATE: 18 BRPM

## 2024-10-23 DIAGNOSIS — G43.009 MIGRAINE WITHOUT AURA AND WITHOUT STATUS MIGRAINOSUS, NOT INTRACTABLE: Primary | ICD-10-CM

## 2024-10-23 DIAGNOSIS — R39.9 UTI SYMPTOMS: ICD-10-CM

## 2024-10-23 DIAGNOSIS — G43.009 MIGRAINE WITHOUT AURA AND WITHOUT STATUS MIGRAINOSUS, NOT INTRACTABLE: ICD-10-CM

## 2024-10-23 DIAGNOSIS — R31.9 URINARY TRACT INFECTION WITH HEMATURIA, SITE UNSPECIFIED: Primary | ICD-10-CM

## 2024-10-23 DIAGNOSIS — N39.0 URINARY TRACT INFECTION WITH HEMATURIA, SITE UNSPECIFIED: Primary | ICD-10-CM

## 2024-10-23 DIAGNOSIS — B37.49 CANDIDIASIS OF GENITALIA: ICD-10-CM

## 2024-10-23 DIAGNOSIS — N39.0 FREQUENT UTI: ICD-10-CM

## 2024-10-23 LAB
POC APPEARANCE, URINE: ABNORMAL
POC BILIRUBIN, URINE: NEGATIVE
POC BLOOD, URINE: ABNORMAL
POC COLOR, URINE: YELLOW
POC GLUCOSE, URINE: NEGATIVE MG/DL
POC KETONES, URINE: NEGATIVE MG/DL
POC LEUKOCYTES, URINE: ABNORMAL
POC NITRITE,URINE: NEGATIVE
POC PH, URINE: 5 PH
POC PROTEIN, URINE: NEGATIVE MG/DL
POC SPECIFIC GRAVITY, URINE: 1.02
POC UROBILINOGEN, URINE: 1 EU/DL

## 2024-10-23 PROCEDURE — 99214 OFFICE O/P EST MOD 30 MIN: CPT | Performed by: NURSE PRACTITIONER

## 2024-10-23 PROCEDURE — 3048F LDL-C <100 MG/DL: CPT | Performed by: NURSE PRACTITIONER

## 2024-10-23 PROCEDURE — 81002 URINALYSIS NONAUTO W/O SCOPE: CPT | Performed by: NURSE PRACTITIONER

## 2024-10-23 PROCEDURE — 87186 SC STD MICRODIL/AGAR DIL: CPT

## 2024-10-23 PROCEDURE — 3061F NEG MICROALBUMINURIA REV: CPT | Performed by: NURSE PRACTITIONER

## 2024-10-23 PROCEDURE — 3044F HG A1C LEVEL LT 7.0%: CPT | Performed by: NURSE PRACTITIONER

## 2024-10-23 PROCEDURE — 3079F DIAST BP 80-89 MM HG: CPT | Performed by: NURSE PRACTITIONER

## 2024-10-23 PROCEDURE — 97811 ACUP 1/> W/O ESTIM EA ADD 15: CPT | Performed by: ACUPUNCTURIST

## 2024-10-23 PROCEDURE — 3074F SYST BP LT 130 MM HG: CPT | Performed by: NURSE PRACTITIONER

## 2024-10-23 PROCEDURE — 87086 URINE CULTURE/COLONY COUNT: CPT

## 2024-10-23 PROCEDURE — 1036F TOBACCO NON-USER: CPT | Performed by: NURSE PRACTITIONER

## 2024-10-23 PROCEDURE — RXMED WILLOW AMBULATORY MEDICATION CHARGE

## 2024-10-23 PROCEDURE — 97810 ACUP 1/> WO ESTIM 1ST 15 MIN: CPT | Performed by: ACUPUNCTURIST

## 2024-10-23 RX ORDER — NITROFURANTOIN 25; 75 MG/1; MG/1
100 CAPSULE ORAL 2 TIMES DAILY
Qty: 10 CAPSULE | Refills: 0 | Status: SHIPPED | OUTPATIENT
Start: 2024-10-23 | End: 2024-10-28

## 2024-10-23 RX ORDER — UBROGEPANT 50 MG/1
50 TABLET ORAL SEE ADMIN INSTRUCTIONS
Qty: 16 TABLET | Refills: 1 | Status: SHIPPED | OUTPATIENT
Start: 2024-10-23 | End: 2024-11-25

## 2024-10-23 RX ORDER — FLUCONAZOLE 150 MG/1
150 TABLET ORAL ONCE
Qty: 1 TABLET | Refills: 0 | Status: SHIPPED | OUTPATIENT
Start: 2024-10-23 | End: 2024-10-23

## 2024-10-23 ASSESSMENT — ENCOUNTER SYMPTOMS
NAUSEA: 1
VOMITING: 1
NECK PAIN: 1
HEADACHES: 1
HEMATURIA: 1
FREQUENCY: 1

## 2024-10-23 NOTE — PROGRESS NOTES
"Acupuncture Visit:     Please note: Dictation software was used while completing this note and may contain spelling, grammatical, and/or syntax errors.  Please contact me with any questions.    To clinicians, I am always happy to partner with you in the care of your patients. Do not hesitate to call the office or contact me directly regarding any further consultations or with questions regarding the plan of care outlined or patient progress.    Subjective   Patient ID: Caprice Cowart is a 44 y.o. female who presents for Migraine    Insurance visit 11 of 20    Patient recently experienced a long stretch of migraine which finally broke with a medrol dose pack; they were near daily, greatly affecting her ADL, and as such, patient has taken a medical CARMENZA; she reports no migraine, only a couple of slight HA with minimal sx    She also continues to experience insomnia, primarily being unable to fall asleep      Initial intake (02/14/2024 )    Pt came in today seeking treatment for chronic neck pain and migraine; she was referred by Isabel Padilla PA-C ([facility])    Patient stated that she has had migraines since her early 20s, and they became worse after her hysterectomy; she has not had any migraines since Christmas, taking both Ubrelvy and Topamax; her migraines are generally bilaterally temporal, lasting hours to days, accompanied by noise sensitivity, nausea, and vomiting; patient also gets tension headaches from neck pain    Patient had a hysterectomy 4/2023 due to stage IV endometriosis, noting that she does not feel like she has \"bounced back\" like she expected; she is also experiencing fatigue, noting that she is recovering from COVID    Migraine   This is a chronic problem. The problem has been gradually improving. The pain is located in the Temporal region. Associated symptoms include nausea, neck pain, phonophobia and vomiting.   Neck Pain   This is a chronic problem. Associated symptoms include headaches. "       Session Information  Is this acupuncture treatment being billed to the patient's insurance company: Yes  This is visit number: 11  The patient has a total number of visits of: 20  Last Treatment date: 08/19/24  Name of Insurance Company:  Employee Medical Plan  Visit Type: Follow-up visit  Medical History Reviewed: I have reviewed pertinent medical history in EHR, and no contraindications are present to provide treatment  Description of present complaint: Chronic pain, Headache, Muscle tension, Stress, Sleep disturbance         Review of Systems   Gastrointestinal:  Positive for nausea and vomiting.   Musculoskeletal:  Positive for neck pain.   Neurological:  Positive for headaches.            Provider reviewed plan for the acupuncture session, precautions and contraindications. Patient/guardian/hospital staff has given consent to treat with full understanding of what to expect during the session. Before acupuncture began, provider explained to the patient to communicate at any time if the procedure was causing discomfort past their tolerance level. Patient agreed to advise acupuncturist. The acupuncturist counseled the patient on the risks of acupuncture treatment including pain, infection, bleeding, and no relief of pain. The patient was positioned comfortably. There was no evidence of infection at the site of needle insertions.    Objective   Physical Exam         Treatment Plan  Treatment Goals: Pain management, Wellbeing improvement, Stress reduction, Relaxation    Acupuncture Treatment  Patient Position: Supine on a table  Needle Guage: 40 guage /.16/ Red seirin, 42 guage /.14/ Lime green seirin  Body Points - Left: GB41  Body Points - Bilateral: KD7, KD10, ST Qi line x3, LIV3, (YT)  Needle Count In: 14  Needle Count Out: 14  Needle Retention Time (min): 25 minutes  Total Face to Face Time (min): 20 minutes              Assessment/Plan

## 2024-10-23 NOTE — PROGRESS NOTES
Subjective   Caprice Cowart is a 44 y.o. female who presents for Urinary Frequency.    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 Initially had some relief, but symptoms have returned.  Urinary Frequency   The current episode started in the past 7 days. The quality of the pain is described as burning. Associated symptoms include frequency and hematuria.     Review of Systems   Genitourinary:  Positive for frequency and hematuria.     Objective   Physical Exam  Constitutional:       General: She is not in acute distress.     Appearance: Normal appearance. She is not ill-appearing or toxic-appearing.   Cardiovascular:      Rate and Rhythm: Normal rate.      Heart sounds: No murmur heard.  Pulmonary:      Effort: Pulmonary effort is normal.   Abdominal:      General: Bowel sounds are normal.      Palpations: Abdomen is soft.      Tenderness: There is abdominal tenderness (mild suprapubic tenderness on palpation). There is no right CVA tenderness or left CVA tenderness.   Skin:     General: Skin is warm and dry.   Neurological:      Mental Status: She is alert and oriented to person, place, and time.   Psychiatric:         Mood and Affect: Mood normal.     /84   Pulse 82   Temp 36.1 °C (97 °F)   Resp 18   Wt 97.5 kg (215 lb)   LMP 03/16/2023   SpO2 98%   BMI 34.70 kg/m²   Assessment/Plan   Problem List Items Addressed This Visit       UTI symptoms    Relevant Orders    POCT UA (nonautomated) manually resulted (Completed)    Urine Culture     Other Visit Diagnoses       Urinary tract infection with hematuria, site unspecified    -  Primary    Relevant Medications    nitrofurantoin, macrocrystal-monohydrate, (Macrobid) 100 mg capsule    Candidiasis of genitalia        Relevant Medications    fluconazole (Diflucan) 150 mg tablet    Frequent UTI

## 2024-10-24 PROCEDURE — RXMED WILLOW AMBULATORY MEDICATION CHARGE

## 2024-10-26 ENCOUNTER — PHARMACY VISIT (OUTPATIENT)
Dept: PHARMACY | Facility: CLINIC | Age: 44
End: 2024-10-26
Payer: COMMERCIAL

## 2024-10-26 LAB — BACTERIA UR CULT: ABNORMAL

## 2024-10-26 PROCEDURE — RXMED WILLOW AMBULATORY MEDICATION CHARGE

## 2024-10-29 DIAGNOSIS — U07.1 COVID-19: Primary | ICD-10-CM

## 2024-10-29 PROCEDURE — RXMED WILLOW AMBULATORY MEDICATION CHARGE

## 2024-10-30 ENCOUNTER — PHARMACY VISIT (OUTPATIENT)
Dept: PHARMACY | Facility: CLINIC | Age: 44
End: 2024-10-30
Payer: COMMERCIAL

## 2024-11-08 ENCOUNTER — OFFICE VISIT (OUTPATIENT)
Dept: NEUROLOGY | Facility: CLINIC | Age: 44
End: 2024-11-08
Payer: COMMERCIAL

## 2024-11-08 ENCOUNTER — APPOINTMENT (OUTPATIENT)
Dept: PRIMARY CARE | Facility: CLINIC | Age: 44
End: 2024-11-08
Payer: COMMERCIAL

## 2024-11-08 VITALS — TEMPERATURE: 98.1 F | DIASTOLIC BLOOD PRESSURE: 82 MMHG | HEART RATE: 74 BPM | SYSTOLIC BLOOD PRESSURE: 128 MMHG

## 2024-11-08 VITALS
BODY MASS INDEX: 35.2 KG/M2 | TEMPERATURE: 97.9 F | WEIGHT: 219 LBS | DIASTOLIC BLOOD PRESSURE: 78 MMHG | HEIGHT: 66 IN | SYSTOLIC BLOOD PRESSURE: 136 MMHG | RESPIRATION RATE: 20 BRPM | HEART RATE: 96 BPM

## 2024-11-08 DIAGNOSIS — G43.009 MIGRAINE WITHOUT AURA AND WITHOUT STATUS MIGRAINOSUS, NOT INTRACTABLE: Primary | ICD-10-CM

## 2024-11-08 DIAGNOSIS — F32.0 DEPRESSION, MAJOR, SINGLE EPISODE, MILD (CMS-HCC): ICD-10-CM

## 2024-11-08 DIAGNOSIS — E11.9 TYPE 2 DIABETES MELLITUS WITHOUT COMPLICATION, WITHOUT LONG-TERM CURRENT USE OF INSULIN (MULTI): Primary | ICD-10-CM

## 2024-11-08 DIAGNOSIS — G43.009 MIGRAINE WITHOUT AURA AND WITHOUT STATUS MIGRAINOSUS, NOT INTRACTABLE: ICD-10-CM

## 2024-11-08 DIAGNOSIS — F41.9 ANXIETY: ICD-10-CM

## 2024-11-08 LAB — POC HEMOGLOBIN A1C: 7.3 % (ref 4.2–6.5)

## 2024-11-08 PROCEDURE — 99214 OFFICE O/P EST MOD 30 MIN: CPT | Performed by: FAMILY MEDICINE

## 2024-11-08 PROCEDURE — 1036F TOBACCO NON-USER: CPT

## 2024-11-08 PROCEDURE — 3044F HG A1C LEVEL LT 7.0%: CPT

## 2024-11-08 PROCEDURE — 3079F DIAST BP 80-89 MM HG: CPT

## 2024-11-08 PROCEDURE — RXMED WILLOW AMBULATORY MEDICATION CHARGE

## 2024-11-08 PROCEDURE — 3074F SYST BP LT 130 MM HG: CPT

## 2024-11-08 PROCEDURE — 3048F LDL-C <100 MG/DL: CPT

## 2024-11-08 PROCEDURE — 3061F NEG MICROALBUMINURIA REV: CPT

## 2024-11-08 PROCEDURE — 99213 OFFICE O/P EST LOW 20 MIN: CPT

## 2024-11-08 PROCEDURE — 83036 HEMOGLOBIN GLYCOSYLATED A1C: CPT | Performed by: FAMILY MEDICINE

## 2024-11-08 RX ORDER — BLOOD SUGAR DIAGNOSTIC
1 STRIP MISCELLANEOUS 2 TIMES DAILY
COMMUNITY
End: 2024-11-08 | Stop reason: SDUPTHER

## 2024-11-08 RX ORDER — BLOOD SUGAR DIAGNOSTIC
1 STRIP MISCELLANEOUS 2 TIMES DAILY
Qty: 100 STRIP | Refills: 3 | Status: SHIPPED | OUTPATIENT
Start: 2024-11-08

## 2024-11-08 RX ORDER — ARIPIPRAZOLE 2 MG/1
2 TABLET ORAL DAILY
Qty: 90 TABLET | Refills: 3 | Status: SHIPPED | OUTPATIENT
Start: 2024-11-08 | End: 2025-11-08

## 2024-11-08 RX ORDER — MELATONIN 3 MG
25 TABLET ORAL DAILY
COMMUNITY

## 2024-11-08 ASSESSMENT — ENCOUNTER SYMPTOMS
COUGH: 0
CONFUSION: 0
MYALGIAS: 0
NUMBNESS: 0
WEIGHT LOSS: 1
DIARRHEA: 0
VOMITING: 0
HEMATURIA: 0
NERVOUS/ANXIOUS: 1
WEAKNESS: 0
WHEEZING: 0
DYSPHORIC MOOD: 1
POLYPHAGIA: 1
TREMORS: 0
VISUAL CHANGE: 0
SPEECH DIFFICULTY: 0
SHORTNESS OF BREATH: 0
FATIGUE: 1
NERVOUS/ANXIOUS: 0
SWEATS: 0
POLYDIPSIA: 0
HUNGER: 0
DIZZINESS: 0
NAUSEA: 0
DYSURIA: 0
BLACKOUTS: 0
HEADACHES: 0
BLURRED VISION: 0
DIFFICULTY URINATING: 0
SEIZURES: 0

## 2024-11-08 ASSESSMENT — PAIN SCALES - GENERAL: PAINLEVEL_OUTOF10: 0-NO PAIN

## 2024-11-08 NOTE — PATIENT INSTRUCTIONS
Dear Caprice,    Thank you for coming to [facility]. It was a pleasure caring for you today.  The best way to contact me for medication refills or questions about your care is through [employer].  Otherwise, you can contact the office by phone: [phone].    Seble Agee, SHAHNAZ-CNP

## 2024-11-08 NOTE — PROGRESS NOTES
Subjective   Patient ID: Caprice Cowart is a 44 y.o. female who presents for Follow-up (3m follow up for Diabetes and anxiety/depression).    Diabetes  She has type 2 diabetes mellitus. No MedicAlert identification noted. The initial diagnosis of diabetes was made 4 years ago. Hypoglycemia symptoms include nervousness/anxiousness. Pertinent negatives for hypoglycemia include no confusion, dizziness, headaches, hunger, mood changes, pallor, seizures, sleepiness, speech difficulty, sweats or tremors. Associated symptoms include fatigue, polyphagia, polyuria and weight loss. Pertinent negatives for diabetes include no blurred vision, no chest pain, no foot paresthesias, no foot ulcerations, no polydipsia, no visual change and no weakness. Pertinent negatives for hypoglycemia complications include no blackouts, no hospitalization, no nocturnal hypoglycemia, no required assistance and no required glucagon injection. Symptoms are stable. Pertinent negatives for diabetic complications include no CVA, heart disease, impotence, nephropathy, peripheral neuropathy, PVD or retinopathy. Risk factors for coronary artery disease include dyslipidemia, family history, hypertension, obesity, sedentary lifestyle and stress. Current diabetic treatment includes oral agent (dual therapy). She is compliant with treatment most of the time. Insulin injections are given by patient. Rotation sites for injection include the abdominal wall. Her weight is stable. She is following a generally healthy diet. Meal planning includes avoidance of concentrated sweets. She has not had a previous visit with a dietitian. She participates in exercise intermittently. She monitors blood glucose at home 1-2 x per week. She monitors urine at home <1 x per month. Blood glucose monitoring compliance is adequate. Her home blood glucose trend is increasing steadily. Her breakfast blood glucose is taken between 5-6 am. Her breakfast blood glucose range is  "generally 140-180 mg/dl. Her lunch blood glucose is taken between 12-1 pm. Her dinner blood glucose is taken between 6-7 pm. Her bedtime blood glucose is taken between 8-9 pm. She sees a podiatrist.Eye exam is current.        Review of Systems   Constitutional:  Positive for fatigue and weight loss.        Pt feels deconditioned   Eyes:  Negative for blurred vision and visual disturbance.   Respiratory:  Negative for cough, shortness of breath and wheezing.    Cardiovascular:  Negative for chest pain.   Gastrointestinal:  Negative for diarrhea, nausea and vomiting.   Endocrine: Positive for polyphagia and polyuria. Negative for polydipsia.        On metformin   On semaglutide, will increase dose for hba1c of 7.3 and wt loss  Meds well tolerated   Genitourinary:  Negative for difficulty urinating, dysuria, hematuria and impotence.   Musculoskeletal:  Negative for myalgias.   Skin:  Negative for pallor.   Neurological:  Negative for dizziness, tremors, seizures, speech difficulty, weakness, numbness and headaches.        Few h/a since last seen and treated easily  Pt on ajovy , ubrelvy and zavzpret  Much better control of h/a   Psychiatric/Behavioral:  Positive for dysphoric mood. Negative for confusion and suicidal ideas. The patient is nervous/anxious.         Pt anxiety and depression has improved, on duloxetine and abilify  Has been working well  Pt is ready to RTW full time       Objective   /78   Pulse 96   Temp 36.6 °C (97.9 °F)   Resp 20   Ht 1.676 m (5' 6\")   Wt 99.3 kg (219 lb)   LMP 03/16/2023   BMI 35.35 kg/m²     Physical Exam  Vitals and nursing note reviewed.   Constitutional:       General: She is not in acute distress.     Appearance: Normal appearance.   HENT:      Head: Normocephalic and atraumatic.   Cardiovascular:      Rate and Rhythm: Normal rate and regular rhythm.      Heart sounds: Normal heart sounds.   Pulmonary:      Effort: Pulmonary effort is normal.      Breath sounds: " Normal breath sounds.   Skin:     General: Skin is warm and dry.   Neurological:      General: No focal deficit present.      Mental Status: She is alert.   Psychiatric:         Mood and Affect: Mood normal.         Behavior: Behavior normal.         Assessment/Plan   Problem List Items Addressed This Visit             ICD-10-CM    Anxiety F41.9     On duloxetine and abilify  Symptoms much improved  Will RTW 11/11/24         Relevant Medications    ARIPiprazole (Abilify) 2 mg tablet    Depression, major, single episode, mild (CMS-HCC) F32.0     Pt on duloxetine and abilify  Meds working well  Will RTW 11/11/24         Relevant Medications    ARIPiprazole (Abilify) 2 mg tablet    Migraine without aura and without status migrainosus, not intractable G43.009     Pt on ajovy ubrelvy and zavzpret  Migraines are improved with combo meds         Diabetes mellitus (Multi) - Primary E11.9     Pt on metformin , and semaglutide  Hba1c 7.3  Will increase semaglutide   f/up in 3 mo         Relevant Medications    semaglutide (OZEMPIC) 1 mg/dose (4 mg/3 mL) pen injector    blood sugar diagnostic (Accu-Chek Guide test strips) strip    Other Relevant Orders    POCT glycosylated hemoglobin (Hb A1C) manually resulted (Completed)    Follow Up In [facility] - PCP - Established

## 2024-11-08 NOTE — PROGRESS NOTES
Chief Complaint   Patient presents with    Follow-up    Migraine     Last visit we discontinued Aimovig and switch to Ajovy. Also gave sample of Zavzpret.  Notices a difference in the switch to Ajovy and states it almost has been a month since she had a migraine. Tried Zavzpret- got rid of the pain- took migraine away- still had other side effects like fatigue and weakness. Started steroid jered on 10/15. Has been good ever since. Had a couple headaches but was able to treat with tylenol or Advil.        Subjective   HPI  Caprice Cowart is a 44 y.o. year old female who presents with chief complaint No primary diagnosis found.    States that she had a recurrent headache last month, did Toradol and Medrol Dose Jered last month, was helpful.     Caprice is having 6 HA days per month, 1 of the HAs meet migraine criteria. She is happy with the current treatment regimen and headache response to medications.   Prior to starting Ajovy, she was having 26 headache days per month.  The current rescue medications work within  30 minutes to 1 hour, and decreased the   Current/ past HA treatments:  Preventive:  Aimovig- not helpful  Ajovy 9/30/24- has been helpful  Rescue:  Zavzpret- helpful  Toradol protocol/ steroids helpful    Current Outpatient Medications:     ARIPiprazole (Abilify) 2 mg tablet, Take 1 tablet (2 mg) by mouth once daily., Disp: 90 tablet, Rfl: 3    atorvastatin (Lipitor) 40 mg tablet, Take 1 tablet (40 mg) by mouth once daily., Disp: 90 tablet, Rfl: 0    blood sugar diagnostic (Accu-Chek Guide test strips) strip, Use 1 strip to test blood sugar 2 times daily., Disp: 100 strip, Rfl: 3    budesonide (Rhinocort AQ) 32 mcg/actuation nasal spray, Administer 1 spray into each nostril once daily., Disp: 8.43 mL, Rfl: 3    cholecalciferol (Vitamin D3) 5,000 Units tablet, Take 1 tablet (5,000 Units) by mouth once daily., Disp: , Rfl:     dehydroepiandrosterone (DHEA) 25 mg tablet, Take 1 tablet (25 mg) by mouth once  daily., Disp: , Rfl:     DULoxetine (Cymbalta) 60 mg DR capsule, Take 1 capsule (60 mg) by mouth once daily., Disp: 90 capsule, Rfl: 3    fexofenadine (Allegra) 180 mg tablet, Take 1 tablet (180 mg) by mouth once daily., Disp: 90 tablet, Rfl: 3    fremanezumab (Ajovy) 225 mg/1.5 mL auto-injector, Inject 1 Pen (225 mg) under the skin every 28 (twenty-eight) days., Disp: 4.5 mL, Rfl: 3    MAGNESIUM GLYCINATE ORAL, Take by mouth., Disp: , Rfl:     medical supply, miscellaneous (MISCELLANEOUS CREAMS TOP), Apply topically. W-biest 50/50 cream. Estrgen, Disp: , Rfl:     metFORMIN (Glucophage) 500 mg tablet, Take 2 tablets (1,000 mg) by mouth 2 times daily (morning and late afternoon)., Disp: 360 tablet, Rfl: 1    metoprolol succinate XL (Toprol-XL) 25 mg 24 hr tablet, Take 1 tablet (25 mg) by mouth once daily. Do not crush or chew., Disp: 90 tablet, Rfl: 3    montelukast (Singulair) 10 mg tablet, Take 1 tablet (10 mg) by mouth once daily., Disp: 90 tablet, Rfl: 3    multivitamin tablet, Take by mouth., Disp: , Rfl:     omeprazole (PriLOSEC) 20 mg DR capsule, Take 1 capsule (20 mg) by mouth once daily., Disp: 90 capsule, Rfl: 3    semaglutide (OZEMPIC) 1 mg/dose (4 mg/3 mL) pen injector, Inject 1 mg under the skin 1 (one) time per week., Disp: 3 mL, Rfl: 0    ubrogepant (Ubrelvy) 50 mg tablet, Take 1 tablet (50 mg) by mouth at onset of acute breakthrough migraine. May repeat in 2 hours if needed., Disp: 16 tablet, Rfl: 1    zavegepant (Zavzpret) 10 mg/actuation spray,non-aerosol, Administer 10 mg into affected nostril(s) once daily as needed (migraine, no more than 1 device per 24 hours)., Disp: 6 each, Rfl: 11    Past Medical History:   Diagnosis Date    Abnormal Pap smear of cervix     Abnormal uterine bleeding (AUB) 02/07/2023    Acute maxillary sinusitis 02/07/2023    Acute upper respiratory infection, unspecified 11/20/2019    Acute URI    Allergic     Allergy status to unspecified drugs, medicaments and biological  substances     History of seasonal allergies    Anemia     Anxiety     Body mass index (BMI) 34.0-34.9, adult 01/08/2021    Body mass index (BMI) of 34.0 to 34.9 in adult    Diabetes mellitus (Multi)     Eczema     Elevated heart rate with elevated blood pressure without diagnosis of hypertension 02/07/2023    Excessive and frequent menstruation with regular cycle 04/02/2020    Spotting    Female pelvic pain 02/07/2023    Flank pain 02/07/2023    GERD (gastroesophageal reflux disease)     Headache     HPV (human papilloma virus) infection     Hypertension     Menorrhagia with regular cycle 02/07/2023    Other abnormal glucose 01/08/2021    Elevated glucose    Personal history of other diseases of the digestive system     History of irritable bowel syndrome    Personal history of other diseases of the digestive system     History of esophageal reflux    Personal history of other diseases of the nervous system and sense organs     History of migraine    Personal history of other endocrine, nutritional and metabolic disease 07/08/2020    History of obesity    Personal history of other infectious and parasitic diseases 10/28/2019    History of viral infection    Personal history of other specified conditions 10/28/2019    History of fever    Rash and other nonspecific skin eruption 07/20/2020    Rash    Urinary tract infection, site not specified 05/30/2020    Acute UTI (urinary tract infection)    Visual impairment     Vitamin D deficiency, unspecified     Vitamin D deficiency     Past Surgical History:   Procedure Laterality Date    ADENOIDECTOMY      APPENDECTOMY  8/8/2022    HYSTERECTOMY  4/4/2023    KNEE SURGERY  08/15/2016    Knee Surgery    LAPAROSCOPIC HYSTERECTOMY      WITHOUT BILATERAL OOPHORECTOMY    LAPAROSCOPY DIAGNOSTIC / BIOPSY / ASPIRATION / LYSIS  10/14/2022    Exploratory Laparoscopy    OTHER SURGICAL HISTORY  08/15/2016    Sigmoidoscopy (Fiberoptic, Therapeutic )    OTHER SURGICAL HISTORY  08/23/2022     Laparoscopy    OTHER SURGICAL HISTORY  08/23/2022    Appendectomy laparoscopic    OTHER SURGICAL HISTORY  08/23/2022    Presacral neurectomy    TONSILLECTOMY  08/15/2016    Tonsillectomy     Family History   Problem Relation Name Age of Onset    Diabetes Mother Yoko     Kidney failure Mother Yoko     Heart failure Mother Yoko     Arthritis Mother Yoko     Asthma Mother Yoko     Depression Mother Yoko     Hearing loss Mother Yoko     Hyperlipidemia Mother Yoko     Kidney disease Mother Yoko     Vision loss Mother Yoko     Other (ATHEROSCLEROSIS) Father Hector     Other (CABG) Father Hector     COPD Father Hector     Coronary artery disease Father Hector     Diabetes Father Hector     Hypertension Father Hector     Atrial fibrillation Father Hector     Alcohol abuse Father Hector     Hearing loss Father Hector     Heart disease Father Hector     Hyperlipidemia Father Hector     Cancer Father Hector     Colon cancer Paternal Grandmother Lashanda     Alcohol abuse Brother Lionel     Alcohol abuse Brother Hector II     Alcohol abuse Brother Figueroa     Drug abuse Brother Figueroa     Arthritis Mother's Sister Lyndia     Asthma Mother's Sister Lyndia     Hearing loss Mother's Sister Lyndia     Kidney disease Mother's Sister Lyndia      Social History     Tobacco Use    Smoking status: Former     Current packs/day: 1.00     Types: Cigarettes     Passive exposure: Past    Smokeless tobacco: Never   Substance Use Topics    Alcohol use: Yes     Comment: Rare     Social History     Substance and Sexual Activity   Drug Use Never      ROS  As noted in HPI, otherwise all other systems have been reviewed are negative for complaint.     Objective   General Appearance: Caprice is well-developed, well-nourished, 44 y.o. year old female, in no acute distress. Makes good eye contact, is alert, interactive, and cooperative. Demonstrates recent & remote memory recall. Subjective information consistent with  objective assessment.     Vitals:    11/08/24 1042   BP: 128/82   Pulse: 74   Temp: 36.7 °C (98.1 °F)   TempSrc: Temporal   PainSc: 0-No pain   LMP: 03/16/2023       Lab Results   Component Value Date    WBC 11.7 (H) 04/04/2024    RBC 4.59 04/04/2024    HGB 12.8 04/04/2024    HCT 40.1 04/04/2024     04/04/2024     01/12/2024    K 4.1 01/12/2024     01/12/2024    BUN 15 01/12/2024    CREATININE 0.68 01/12/2024    EGFR >90 01/12/2024    CALCIUM 9.2 01/12/2024    ALKPHOS 131 (H) 07/14/2023    AST 14 07/14/2023    ALT 21 07/14/2023    IDDQHTJC37 605 04/04/2024    VITD25 62 04/04/2024    HGBA1C 7.3 (A) 11/08/2024    LDLCALC 51 08/09/2024    CHOL 145 08/09/2024    HDL 61.3 08/09/2024    TRIG 162 (H) 08/09/2024    TSH 1.80 05/15/2024      Neurological Exam  Cranial Nerves  CN III, IV, VI: Extraocular movements intact bilaterally. Normal lids and orbits bilaterally.  CN VII: Full and symmetric facial movement.  CN VIII: Hearing is normal.    Assessment & Plan  Migraine without aura and without status migrainosus, not intractable              ASSESSMENT/PLAN:  Continue current medication regimen  Follow up in 6 months. Sooner if needed.       Seble Agee, APRN-CNP

## 2024-11-08 NOTE — ASSESSMENT & PLAN NOTE
Pt on duloxetine and abilify  Meds working well  Will RTW 11/11/24   Clear bilaterally, pupils equal, round and reactive to light.

## 2024-11-12 ENCOUNTER — PHARMACY VISIT (OUTPATIENT)
Dept: PHARMACY | Facility: CLINIC | Age: 44
End: 2024-11-12
Payer: COMMERCIAL

## 2024-11-13 ENCOUNTER — APPOINTMENT (OUTPATIENT)
Dept: INTEGRATIVE MEDICINE | Facility: CLINIC | Age: 44
End: 2024-11-13
Payer: COMMERCIAL

## 2024-11-13 ENCOUNTER — PHARMACY VISIT (OUTPATIENT)
Dept: PHARMACY | Facility: CLINIC | Age: 44
End: 2024-11-13

## 2024-11-13 DIAGNOSIS — M54.2 CHRONIC NECK PAIN: Primary | ICD-10-CM

## 2024-11-13 DIAGNOSIS — M54.59 OTHER LOW BACK PAIN: ICD-10-CM

## 2024-11-13 DIAGNOSIS — G89.29 CHRONIC NECK PAIN: Primary | ICD-10-CM

## 2024-11-13 PROCEDURE — 97811 ACUP 1/> W/O ESTIM EA ADD 15: CPT | Performed by: ACUPUNCTURIST

## 2024-11-13 PROCEDURE — 97810 ACUP 1/> WO ESTIM 1ST 15 MIN: CPT | Performed by: ACUPUNCTURIST

## 2024-11-13 ASSESSMENT — ENCOUNTER SYMPTOMS
NAUSEA: 1
BACK PAIN: 1
VOMITING: 1
NECK PAIN: 1
HEADACHES: 1

## 2024-11-13 NOTE — PROGRESS NOTES
"Acupuncture Visit:     Please note: Dictation software was used while completing this note and may contain spelling, grammatical, and/or syntax errors.  Please contact me with any questions.    To clinicians, I am always happy to partner with you in the care of your patients. Do not hesitate to call the office or contact me directly regarding any further consultations or with questions regarding the plan of care outlined or patient progress.    Subjective   Patient ID: Caprice Cowart is a 44 y.o. 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    Patient has not been experiencing any HA or migraine as of  late      Initial intake (02/14/2024 )    Pt came in today seeking treatment for chronic neck pain and migraine; [place] Isabel Padilla [place] [place]    Patient stated that she has had migraines since her early 20s, and they became worse after her hysterectomy; she has not had any migraines since Christmas, taking both Ubrelvy and Topamax; her migraines are generally bilaterally temporal, lasting hours to days, accompanied by noise sensitivity, nausea, and vomiting; patient also gets tension headaches from neck pain    Patient had a hysterectomy 4/2023 due to stage IV endometriosis, noting that she does not feel like she has \"bounced back\" like she expected; she is also experiencing fatigue, noting that she is recovering from COVID    Migraine   This is a chronic problem. The problem has been gradually improving. The pain is located in the Temporal region. Associated symptoms include back pain, nausea, neck pain, phonophobia and vomiting.   Neck Pain   This is a chronic problem. Associated symptoms include headaches.   Back Pain  Associated symptoms include headaches.       Session Information  Is this acupuncture " treatment being billed to the patient's insurance company: Yes  This is visit number: 12  The patient has a total number of visits of: 20  Last Treatment date: 10/23/24  Name of Insurance Company:  Employee Medical Plan  Visit Type: Follow-up visit  Medical History Reviewed: I have reviewed pertinent medical history in EHR, and no contraindications are present to provide treatment  Description of present complaint: Chronic pain, Headache, Muscle tension, Stress, Sleep disturbance         Review of Systems   Gastrointestinal:  Positive for nausea and vomiting.   Musculoskeletal:  Positive for back pain and neck pain.   Neurological:  Positive for headaches.            Provider reviewed plan for the acupuncture session, precautions and contraindications. Patient/guardian/hospital staff has given consent to treat with full understanding of what to expect during the session. Before acupuncture began, provider explained to the patient to communicate at any time if the procedure was causing discomfort past their tolerance level. Patient agreed to advise acupuncturist. The acupuncturist counseled the patient on the risks of acupuncture treatment including pain, infection, bleeding, and no relief of pain. The patient was positioned comfortably. There was no evidence of infection at the site of needle insertions.    Objective   Physical Exam         Treatment Plan  Treatment Goals: Pain management, Wellbeing improvement, Stress reduction    Acupuncture Treatment  Patient Position: Prone on a table  Needle Guage: 36 guage /.20/ Blue seirin, 30 guage /.30/ Brown seirin, 32 guage /.25/ Purple seirin  Body Points - Bilateral: KD7, BL23, BL52, BL20, BL14, suboccipitals  Body Points - Right: splenius cervicis, upper traps, levator/rhomboid, gluteus medius  Other Techniques Utilized: TDP Lamp, Topicals  Topicals Description: Posumon oil  TDP Lamp Descripton: Low back, 25min  Needle Count In: 16  Needle Count Out: 16  Needle  Retention Time (min): 25 minutes  Total Face to Face Time (min): 20 minutes              Assessment/Plan

## 2024-11-19 DIAGNOSIS — E11.9 NEW ONSET TYPE 2 DIABETES MELLITUS (MULTI): ICD-10-CM

## 2024-11-19 DIAGNOSIS — E78.1 HIGH TRIGLYCERIDES: ICD-10-CM

## 2024-11-19 PROCEDURE — RXMED WILLOW AMBULATORY MEDICATION CHARGE

## 2024-11-19 RX ORDER — ATORVASTATIN CALCIUM 40 MG/1
40 TABLET, FILM COATED ORAL DAILY
Qty: 90 TABLET | Refills: 0 | Status: SHIPPED | OUTPATIENT
Start: 2024-11-19 | End: 2025-11-14

## 2024-11-20 PROCEDURE — RXMED WILLOW AMBULATORY MEDICATION CHARGE

## 2024-11-20 RX ORDER — METFORMIN HYDROCHLORIDE 500 MG/1
1000 TABLET ORAL
Qty: 360 TABLET | Refills: 1 | Status: SHIPPED | OUTPATIENT
Start: 2024-11-20

## 2024-11-20 NOTE — TELEPHONE ENCOUNTER
Patient, Pharmacy requests prescription below    Last Office Visit: 11/8/2024   Next Office Visit: 2/7/2025     Requested Prescriptions     Pending Prescriptions Disp Refills    metFORMIN (Glucophage) 500 mg tablet 360 tablet 1     Sig: Take 2 tablets (1,000 mg) by mouth 2 times daily (morning and late afternoon).

## 2024-11-22 ENCOUNTER — PHARMACY VISIT (OUTPATIENT)
Dept: PHARMACY | Facility: CLINIC | Age: 44
End: 2024-11-22
Payer: COMMERCIAL

## 2024-12-03 DIAGNOSIS — E11.9 TYPE 2 DIABETES MELLITUS WITHOUT COMPLICATION, WITHOUT LONG-TERM CURRENT USE OF INSULIN (MULTI): ICD-10-CM

## 2024-12-03 RX ORDER — SEMAGLUTIDE 1.34 MG/ML
1 INJECTION, SOLUTION SUBCUTANEOUS WEEKLY
Qty: 3 ML | Refills: 0 | Status: CANCELLED | OUTPATIENT
Start: 2024-12-03 | End: 2025-01-02

## 2024-12-04 ENCOUNTER — APPOINTMENT (OUTPATIENT)
Dept: INTEGRATIVE MEDICINE | Facility: CLINIC | Age: 44
End: 2024-12-04
Payer: COMMERCIAL

## 2024-12-04 DIAGNOSIS — G89.29 CHRONIC NECK PAIN: Primary | ICD-10-CM

## 2024-12-04 DIAGNOSIS — G43.009 MIGRAINE WITHOUT AURA AND WITHOUT STATUS MIGRAINOSUS, NOT INTRACTABLE: ICD-10-CM

## 2024-12-04 DIAGNOSIS — E11.9 TYPE 2 DIABETES MELLITUS WITHOUT COMPLICATION, WITHOUT LONG-TERM CURRENT USE OF INSULIN (MULTI): ICD-10-CM

## 2024-12-04 DIAGNOSIS — M54.2 CHRONIC NECK PAIN: Primary | ICD-10-CM

## 2024-12-04 PROCEDURE — 97810 ACUP 1/> WO ESTIM 1ST 15 MIN: CPT | Performed by: ACUPUNCTURIST

## 2024-12-04 PROCEDURE — 97811 ACUP 1/> W/O ESTIM EA ADD 15: CPT | Performed by: ACUPUNCTURIST

## 2024-12-04 ASSESSMENT — ENCOUNTER SYMPTOMS
VOMITING: 1
NAUSEA: 1
BACK PAIN: 1
HEADACHES: 1
NECK PAIN: 1

## 2024-12-04 NOTE — PROGRESS NOTES
"Acupuncture Visit:     Please note: Dictation software was used while completing this note and may contain spelling, grammatical, and/or syntax errors.  Please contact me with any questions.    To clinicians, I am always happy to partner with you in the care of your patients. Do not hesitate to call the office or contact me directly regarding any further consultations or with questions regarding the plan of care outlined or patient progress.    Subjective   Patient ID: Caprice Cowart is a 45 y.o. female who presents for Neck Pain and Migraine    Insurance visit 13 of 20    Patient reports having 1 HA since her last visit, but no intense migraine    She also notes tension in her upper back and neck, R>L      Initial intake (02/14/2024 )    Pt came in today seeking treatment for chronic neck pain and migraine; she was referred by Isabel Padilla PA-C ([facility] [place])    Patient stated that she has had migraines since her early 20s, and they became worse after her hysterectomy; she has not had any migraines since Christmas, taking both Ubrelvy and Topamax; her migraines are generally bilaterally temporal, lasting hours to days, accompanied by noise sensitivity, nausea, and vomiting; patient also gets tension headaches from neck pain    Patient had a hysterectomy 4/2023 due to stage IV endometriosis, noting that she does not feel like she has \"bounced back\" like she expected; she is also experiencing fatigue, noting that she is recovering from COVID    Migraine   This is a chronic problem. The problem has been gradually improving. The pain is located in the Temporal region. Associated symptoms include back pain, nausea, neck pain, phonophobia and vomiting.   Neck Pain   This is a chronic problem. Associated symptoms include headaches.   Back Pain  Associated symptoms include headaches.       Session Information  Is this acupuncture treatment being billed to the patient's insurance company: Yes  This is visit number: 13  The " patient has a total number of visits of: 20  Last Treatment date: 11/13/24  Name of Insurance Company:  Employee Medical Plan  Visit Type: Follow-up visit  Medical History Reviewed: I have reviewed pertinent medical history in EHR, and no contraindications are present to provide treatment  Description of present complaint: Chronic pain, Headache, Muscle tension, Stress, Sleep disturbance         Review of Systems   Gastrointestinal:  Positive for nausea and vomiting.   Musculoskeletal:  Positive for back pain and neck pain.   Neurological:  Positive for headaches.            Provider reviewed plan for the acupuncture session, precautions and contraindications. Patient/guardian/hospital staff has given consent to treat with full understanding of what to expect during the session. Before acupuncture began, provider explained to the patient to communicate at any time if the procedure was causing discomfort past their tolerance level. Patient agreed to advise acupuncturist. The acupuncturist counseled the patient on the risks of acupuncture treatment including pain, infection, bleeding, and no relief of pain. The patient was positioned comfortably. There was no evidence of infection at the site of needle insertions.    Objective   Physical Exam         Treatment Plan  Treatment Goals: Pain management, Wellbeing improvement, Stress reduction    Acupuncture Treatment  Patient Position: Prone on a table  Needle Guage: 36 guage /.20/ Blue seirin, 30 guage /.30/ Brown seirin, 32 guage /.25/ Purple seirin  Body Points - Bilateral: KD7, BL23, BL52, BL20, BL14, suboccipitals  Body Points - Right: splenius cervicis, upper traps, levator/rhomboid, gluteus medius  Other Techniques Utilized: TDP Lamp, Topicals  Topicals Description: Posumon oil  TDP Lamp Descripton: Low back, 25min  Needle Count In: 16  Needle Count Out: 16  Needle Retention Time (min): 25 minutes  Total Face to Face Time (min): 20 minutes               Assessment/Plan

## 2024-12-05 RX ORDER — SEMAGLUTIDE 1.34 MG/ML
1 INJECTION, SOLUTION SUBCUTANEOUS WEEKLY
Qty: 3 ML | Refills: 0 | Status: SHIPPED | OUTPATIENT
Start: 2024-12-05 | End: 2025-01-04

## 2024-12-11 ENCOUNTER — APPOINTMENT (OUTPATIENT)
Dept: INTEGRATIVE MEDICINE | Facility: CLINIC | Age: 44
End: 2024-12-11
Payer: COMMERCIAL

## 2024-12-13 DIAGNOSIS — J30.89 NON-SEASONAL ALLERGIC RHINITIS, UNSPECIFIED TRIGGER: Primary | ICD-10-CM

## 2024-12-13 PROCEDURE — RXMED WILLOW AMBULATORY MEDICATION CHARGE

## 2024-12-13 RX ORDER — METHYLPREDNISOLONE 4 MG/1
TABLET ORAL
Qty: 21 TABLET | Refills: 0 | Status: SHIPPED | OUTPATIENT
Start: 2024-12-13 | End: 2024-12-20

## 2024-12-14 ENCOUNTER — PHARMACY VISIT (OUTPATIENT)
Dept: PHARMACY | Facility: CLINIC | Age: 44
End: 2024-12-14
Payer: COMMERCIAL

## 2024-12-15 DIAGNOSIS — F32.0 DEPRESSION, MAJOR, SINGLE EPISODE, MILD (CMS-HCC): ICD-10-CM

## 2024-12-16 PROCEDURE — RXMED WILLOW AMBULATORY MEDICATION CHARGE

## 2024-12-16 RX ORDER — DULOXETIN HYDROCHLORIDE 60 MG/1
60 CAPSULE, DELAYED RELEASE ORAL DAILY
Qty: 90 CAPSULE | Refills: 3 | Status: SHIPPED | OUTPATIENT
Start: 2024-12-16

## 2024-12-19 ENCOUNTER — PHARMACY VISIT (OUTPATIENT)
Dept: PHARMACY | Facility: CLINIC | Age: 44
End: 2024-12-19
Payer: COMMERCIAL

## 2024-12-23 DIAGNOSIS — J30.2 SEASONAL ALLERGIES: ICD-10-CM

## 2024-12-26 PROCEDURE — RXMED WILLOW AMBULATORY MEDICATION CHARGE

## 2024-12-26 RX ORDER — MONTELUKAST SODIUM 10 MG/1
10 TABLET ORAL DAILY
Qty: 90 TABLET | Refills: 3 | Status: SHIPPED | OUTPATIENT
Start: 2024-12-26

## 2025-01-02 ENCOUNTER — PHARMACY VISIT (OUTPATIENT)
Dept: PHARMACY | Facility: CLINIC | Age: 45
End: 2025-01-02
Payer: COMMERCIAL

## 2025-01-03 ENCOUNTER — PHARMACY VISIT (OUTPATIENT)
Dept: PHARMACY | Facility: CLINIC | Age: 45
End: 2025-01-03

## 2025-01-08 ENCOUNTER — APPOINTMENT (OUTPATIENT)
Dept: INTEGRATIVE MEDICINE | Facility: CLINIC | Age: 45
End: 2025-01-08
Payer: COMMERCIAL

## 2025-01-09 DIAGNOSIS — E11.9 TYPE 2 DIABETES MELLITUS WITHOUT COMPLICATION, WITHOUT LONG-TERM CURRENT USE OF INSULIN (MULTI): ICD-10-CM

## 2025-01-09 RX ORDER — SEMAGLUTIDE 1.34 MG/ML
1 INJECTION, SOLUTION SUBCUTANEOUS WEEKLY
Qty: 3 ML | Refills: 0 | Status: SHIPPED | OUTPATIENT
Start: 2025-01-09 | End: 2025-02-08

## 2025-01-22 ENCOUNTER — ALLIED HEALTH (OUTPATIENT)
Dept: INTEGRATIVE MEDICINE | Facility: CLINIC | Age: 45
End: 2025-01-22
Payer: COMMERCIAL

## 2025-01-22 ENCOUNTER — APPOINTMENT (OUTPATIENT)
Dept: PHARMACY | Facility: HOSPITAL | Age: 45
End: 2025-01-22
Payer: COMMERCIAL

## 2025-01-22 DIAGNOSIS — G89.29 CHRONIC NECK PAIN: Primary | ICD-10-CM

## 2025-01-22 DIAGNOSIS — M54.2 CHRONIC NECK PAIN: Primary | ICD-10-CM

## 2025-01-22 PROCEDURE — 97810 ACUP 1/> WO ESTIM 1ST 15 MIN: CPT | Performed by: ACUPUNCTURIST

## 2025-01-22 PROCEDURE — 97811 ACUP 1/> W/O ESTIM EA ADD 15: CPT | Performed by: ACUPUNCTURIST

## 2025-01-23 ENCOUNTER — TELEMEDICINE (OUTPATIENT)
Dept: PHARMACY | Facility: HOSPITAL | Age: 45
End: 2025-01-23
Payer: COMMERCIAL

## 2025-01-23 DIAGNOSIS — E11.9 TYPE 2 DIABETES MELLITUS WITHOUT COMPLICATION, WITHOUT LONG-TERM CURRENT USE OF INSULIN (MULTI): ICD-10-CM

## 2025-01-23 ASSESSMENT — ENCOUNTER SYMPTOMS: DIABETIC ASSOCIATED SYMPTOMS: 0

## 2025-01-23 NOTE — PROGRESS NOTES
[facility] ([facility])    Caprice Cowart is a 45 y.o. female was referred to Clinical Pharmacy Team to complete a comprehensive medication review (CMR) with a pharmacist as part of the [employer] diabetes program.      Referring Provider: Jacquelyn De Paz MD    Subjective   Allergies   Allergen Reactions    Penicillins Hives    Promethazine Other     Vomiting    Sumatriptan Other     Angina/chest pressure    Sumatriptan-Naproxen Other     TEXIMET Angina, numbness with tingling    Neomycin-Bacitracin-Polymyxin Rash       [facility]  [address]  [place] [facility] [place] [facility] [address]  Phone: [phone] Fax: [phone]    [facility] [address] - [place], [place] - [address]  [address] [address]  Phone: [phone] Fax: [phone]    [facility] [address] - [place], [place] - [address]  [address] [address]  Phone: [phone] Fax: [phone]     Special[facility] Pharmacy  [address] [address]  Phone: [phone] Fax: [phone]      Diabetes  She presents for her follow-up diabetic visit. She has type 2 diabetes mellitus. Her disease course has been stable. There are no hypoglycemic associated symptoms. There are no diabetic associated symptoms. There are no hypoglycemic complications. Risk factors for coronary artery disease include diabetes mellitus. Current diabetic treatments: Ozempic 1 mg weekly, Metformin 500 mg 2 tabs BID. She is compliant with treatment all of the time.       Objective     LMP 03/16/2023      LAB  Lab Results   Component Value Date    BILITOT 0.3 07/14/2023    CALCIUM 9.2 01/12/2024    CO2 26 01/12/2024     01/12/2024    CREATININE 0.68 01/12/2024    GLUCOSE 129 (H) 01/12/2024    ALKPHOS 131 (H) 07/14/2023    K 4.1 01/12/2024    PROT 7.2 07/14/2023     01/12/2024    AST 14 07/14/2023    ALT 21 07/14/2023    BUN 15 01/12/2024     ANIONGAP 12 01/12/2024    ALBUMIN 4.1 07/14/2023    GFRF >90 07/14/2023     Lab Results   Component Value Date    TRIG 162 (H) 08/09/2024    CHOL 145 08/09/2024    LDLCALC 51 08/09/2024    HDL 61.3 08/09/2024     Lab Results   Component Value Date    HGBA1C 7.3 (A) 11/08/2024       Current Outpatient Medications on File Prior to Visit   Medication Sig Dispense Refill    ARIPiprazole (Abilify) 2 mg tablet Take 1 tablet (2 mg) by mouth once daily. 90 tablet 3    atorvastatin (Lipitor) 40 mg tablet Take 1 tablet (40 mg) by mouth once daily. 90 tablet 0    blood sugar diagnostic (Accu-Chek Guide test strips) strip Use 1 strip to test blood sugar 2 times daily. 100 strip 3    budesonide (Rhinocort AQ) 32 mcg/actuation nasal spray Administer 1 spray into each nostril once daily. 8.43 mL 3    cholecalciferol (Vitamin D3) 5,000 Units tablet Take 1 tablet (5,000 Units) by mouth once daily.      dehydroepiandrosterone (DHEA) 25 mg tablet Take 1 tablet (25 mg) by mouth once daily.      DULoxetine (Cymbalta) 60 mg DR capsule Take 1 capsule (60 mg) by mouth once daily. 90 capsule 3    fexofenadine (Allegra) 180 mg tablet Take 1 tablet (180 mg) by mouth once daily. 90 tablet 3    fremanezumab (Ajovy) 225 mg/1.5 mL auto-injector Inject 1 Pen (225 mg) under the skin every 28 (twenty-eight) days. 4.5 mL 3    MAGNESIUM GLYCINATE ORAL Take by mouth.      medical supply, miscellaneous (MISCELLANEOUS CREAMS TOP) Apply topically. W-biest 50/50 cream. Estrgen      metFORMIN (Glucophage) 500 mg tablet Take 2 tablets (1,000 mg) by mouth 2 times daily (morning and late afternoon). 360 tablet 1    metoprolol succinate XL (Toprol-XL) 25 mg 24 hr tablet Take 1 tablet (25 mg) by mouth once daily. Do not crush or chew. 90 tablet 3    montelukast (Singulair) 10 mg tablet Take 1 tablet (10 mg) by mouth once daily. 90 tablet 3    multivitamin tablet Take by mouth.      omeprazole (PriLOSEC) 20 mg DR capsule Take 1 capsule (20 mg) by mouth once  daily. 90 capsule 3    semaglutide (Ozempic) 1 mg/dose (4 mg/3 mL) pen injector Inject 1 mg under the skin 1 (one) time per week. 3 mL 0    zavegepant (Zavzpret) 10 mg/actuation spray,non-aerosol Administer 10 mg into affected nostril(s) once daily as needed (migraine, no more than 1 device per 24 hours). 6 each 11     No current facility-administered medications on file prior to visit.        HISTORICAL PHARMACOTHERAPY (MED+REASON FOR DC)  -N/A    SECONDARY PREVENTION  - Statin? yes: Atorvastatin 20mg  - ACE-I/ARB? no    ASSESSMENT/PLAN:   Employee Health Diabetes Program (VBID)  - Patient enrolled in  Employee diabetes program for $0 co-pays on diabetes medications/supplies. Enrollment should be active in 2-4 weeks.  - Requested VB[facility] enrollment date: 12/29/24  - PharmD Management Level: 2    Assessment/Plan   Problem List Items Addressed This Visit       Diabetes mellitus (Multi)      Follow up with Clinical Pharmacy Team 1 year VBID Re-enrollment     Continue all meds under the continuation of care with the referring provider and clinical pharmacy team.    Please reach out to the Clinical Pharmacy Team if there are any further questions.     Verbal consent to manage patient's drug therapy was obtained from patient. They were informed they may decline to participate or withdraw from participation in pharmacy services at any time.    Treva Wakefield, PharmD  PGY-1 Pharmacy Resident  [phone]

## 2025-01-26 DIAGNOSIS — J30.2 SEASONAL ALLERGIES: ICD-10-CM

## 2025-01-26 DIAGNOSIS — R03.0 ELEVATED BLOOD PRESSURE READING WITHOUT DIAGNOSIS OF HYPERTENSION: ICD-10-CM

## 2025-01-27 PROCEDURE — RXMED WILLOW AMBULATORY MEDICATION CHARGE

## 2025-01-27 RX ORDER — MINERAL OIL
180 ENEMA (ML) RECTAL DAILY
Qty: 90 TABLET | Refills: 3 | Status: SHIPPED | OUTPATIENT
Start: 2025-01-27 | End: 2026-01-27

## 2025-01-27 RX ORDER — METOPROLOL SUCCINATE 25 MG/1
25 TABLET, EXTENDED RELEASE ORAL DAILY
Qty: 90 TABLET | Refills: 3 | Status: SHIPPED | OUTPATIENT
Start: 2025-01-27 | End: 2026-01-27

## 2025-01-27 NOTE — TELEPHONE ENCOUNTER
Pharmacy requests prescription below    Last Office Visit: 11/8/2024   Next Office Visit: 2/7/2025     Requested Prescriptions     Pending Prescriptions Disp Refills    metoprolol succinate XL (Toprol-XL) 25 mg 24 hr tablet 90 tablet 3     Sig: Take 1 tablet (25 mg) by mouth once daily. Do not crush or chew.    fexofenadine (Allegra) 180 mg tablet 90 tablet 3     Sig: Take 1 tablet (180 mg) by mouth once daily.

## 2025-01-30 ENCOUNTER — PHARMACY VISIT (OUTPATIENT)
Dept: PHARMACY | Facility: CLINIC | Age: 45
End: 2025-01-30
Payer: COMMERCIAL

## 2025-01-31 ASSESSMENT — ENCOUNTER SYMPTOMS
BACK PAIN: 1
NAUSEA: 1
VOMITING: 1
NECK PAIN: 1
HEADACHES: 1

## 2025-01-31 NOTE — PROGRESS NOTES
"Acupuncture Visit:     Please note: Dictation software was used while completing this note and may contain spelling, grammatical, and/or syntax errors.  Please contact me with any questions.    To clinicians, I am always happy to partner with you in the care of your patients. Do not hesitate to call the office or contact me directly regarding any further consultations or with questions regarding the plan of care outlined or patient progress.    Subjective   Patient ID: Caprice Cowart is a 45 y.o. female who presents for Neck Pain and Migraine    Insurance visit 1 of 20    Patient's symptoms are largely baseline today      Initial intake (02/14/2024 )    Pt came in today seeking treatment for chronic neck pain and migraine; she was [place] by Isabel Padilla PA-C [place] [place]    Patient stated that she has had migraines since her early 20s, and they became worse after her hysterectomy; she has not had any migraines since Christmas, taking both Ubrelvy and Topamax; her migraines are generally bilaterally temporal, lasting hours to days, accompanied by noise sensitivity, nausea, and vomiting; patient also gets tension headaches from neck pain    Patient had a hysterectomy 4/2023 due to stage IV endometriosis, noting that she does not feel like she has \"bounced back\" like she expected; she is also experiencing fatigue, noting that she is recovering from COVID    Migraine   This is a chronic problem. The problem has been gradually improving. The pain is located in the Temporal region. Associated symptoms include back pain, nausea, neck pain, phonophobia and vomiting.   Neck Pain   This is a chronic problem. Associated symptoms include headaches.   Back Pain  Associated symptoms include headaches.       Session Information  Is this acupuncture treatment being billed to the patient's insurance company: Yes  This is visit number: 1  The patient has a total number of visits of: 20  Initial Acupuncture Treatment date: " 01/22/25  Name of Insurance Company:  Employee Medical Plan  Visit Type: Follow-up visit  Medical History Reviewed: I have reviewed pertinent medical history in EHR, and no contraindications are present to provide treatment         Review of Systems   Gastrointestinal:  Positive for nausea and vomiting.   Musculoskeletal:  Positive for back pain and neck pain.   Neurological:  Positive for headaches.            Provider reviewed plan for the acupuncture session, precautions and contraindications. Patient/guardian/hospital staff has given consent to treat with full understanding of what to expect during the session. Before acupuncture began, provider explained to the patient to communicate at any time if the procedure was causing discomfort past their tolerance level. Patient agreed to advise acupuncturist. The acupuncturist counseled the patient on the risks of acupuncture treatment including pain, infection, bleeding, and no relief of pain. The patient was positioned comfortably. There was no evidence of infection at the site of needle insertions.    Objective   Physical Exam         Treatment Plan  Treatment Goals: Pain management, Wellbeing improvement, Stress reduction    Acupuncture Treatment  Patient Position: Prone on a table  Needle Guage: 36 guage /.20/ Blue seirin  Body Points - Bilateral: KD7, BL23, BL52, BL20, BL14, suboccipitals, splenius cervicis, levator, infraspinatus, upper traps, teres  Other Techniques Utilized: Topicals, Cupping  Cupping Description: Sliding, paraspinals  Topicals Description: Sports Massage oil  Needle Count In: 22  Needle Count Out: 22  Needle Retention Time (min): 20 minutes  Total Face to Face Time (min): 25 minutes              Assessment/Plan

## 2025-02-07 ENCOUNTER — APPOINTMENT (OUTPATIENT)
Dept: PRIMARY CARE | Facility: CLINIC | Age: 45
End: 2025-02-07
Payer: COMMERCIAL

## 2025-02-07 VITALS
WEIGHT: 220.7 LBS | RESPIRATION RATE: 16 BRPM | HEIGHT: 67 IN | TEMPERATURE: 97.2 F | SYSTOLIC BLOOD PRESSURE: 118 MMHG | BODY MASS INDEX: 34.64 KG/M2 | DIASTOLIC BLOOD PRESSURE: 82 MMHG | HEART RATE: 85 BPM

## 2025-02-07 DIAGNOSIS — F32.0 DEPRESSION, MAJOR, SINGLE EPISODE, MILD (CMS-HCC): ICD-10-CM

## 2025-02-07 DIAGNOSIS — E66.812 CLASS 2 OBESITY DUE TO EXCESS CALORIES WITHOUT SERIOUS COMORBIDITY WITH BODY MASS INDEX (BMI) OF 35.0 TO 35.9 IN ADULT: ICD-10-CM

## 2025-02-07 DIAGNOSIS — E66.09 CLASS 2 OBESITY DUE TO EXCESS CALORIES WITHOUT SERIOUS COMORBIDITY WITH BODY MASS INDEX (BMI) OF 35.0 TO 35.9 IN ADULT: ICD-10-CM

## 2025-02-07 DIAGNOSIS — Z13.31 DEPRESSION SCREEN: ICD-10-CM

## 2025-02-07 DIAGNOSIS — I10 PRIMARY HYPERTENSION: ICD-10-CM

## 2025-02-07 DIAGNOSIS — E11.9 TYPE 2 DIABETES MELLITUS WITHOUT COMPLICATION, WITHOUT LONG-TERM CURRENT USE OF INSULIN (MULTI): Primary | ICD-10-CM

## 2025-02-07 PROBLEM — M79.661 RIGHT CALF PAIN: Status: RESOLVED | Noted: 2024-06-18 | Resolved: 2025-02-07

## 2025-02-07 PROBLEM — M25.551 RIGHT HIP PAIN: Status: RESOLVED | Noted: 2024-06-18 | Resolved: 2025-02-07

## 2025-02-07 PROCEDURE — 99214 OFFICE O/P EST MOD 30 MIN: CPT | Performed by: FAMILY MEDICINE

## 2025-02-07 PROCEDURE — 96127 BRIEF EMOTIONAL/BEHAV ASSMT: CPT | Performed by: FAMILY MEDICINE

## 2025-02-07 ASSESSMENT — ENCOUNTER SYMPTOMS
DIARRHEA: 0
NUMBNESS: 0
HEMATURIA: 0
WEAKNESS: 0
MYALGIAS: 0
NERVOUS/ANXIOUS: 1
NAUSEA: 1
ABDOMINAL DISTENTION: 1
WHEEZING: 0
ARTHRALGIAS: 0
FATIGUE: 1
DYSPHORIC MOOD: 1
CONSTIPATION: 1
VOMITING: 0
POLYDIPSIA: 1
SHORTNESS OF BREATH: 0
DYSURIA: 0

## 2025-02-07 NOTE — PROGRESS NOTES
"Subjective   Patient ID: Caprice Cowart is a 45 y.o. female who presents for MED CHECK (Here for med check. No concerns. ).    HPI     Review of Systems   Constitutional:  Positive for fatigue.   Respiratory:  Negative for shortness of breath and wheezing.    Cardiovascular:  Negative for chest pain.   Gastrointestinal:  Positive for abdominal distention, constipation and nausea. Negative for diarrhea and vomiting.   Endocrine: Positive for polydipsia. Negative for polyuria.        Pt with dm, has noted bs have been increasing, on metformin and semaglutide  Has some side effects with semaglutide, willing to try to increase dose   Genitourinary:  Negative for dysuria and hematuria.   Musculoskeletal:  Negative for arthralgias and myalgias.   Neurological:  Negative for weakness and numbness.   Psychiatric/Behavioral:  Positive for dysphoric mood. Negative for suicidal ideas. The patient is nervous/anxious.         Pt is on medication and is doing well       Objective   /82   Pulse 85   Temp 36.2 °C (97.2 °F)   Resp 16   Ht 1.689 m (5' 6.5\")   Wt 100 kg (220 lb 11.2 oz)   LMP 03/16/2023   BMI 35.09 kg/m²     Physical Exam  Vitals and nursing note reviewed.   Constitutional:       General: She is not in acute distress.     Appearance: Normal appearance.   HENT:      Head: Normocephalic and atraumatic.   Cardiovascular:      Rate and Rhythm: Normal rate and regular rhythm.      Heart sounds: Normal heart sounds.   Pulmonary:      Effort: Pulmonary effort is normal.      Breath sounds: Normal breath sounds.   Skin:     General: Skin is warm and dry.   Neurological:      General: No focal deficit present.      Mental Status: She is alert.   Psychiatric:         Mood and Affect: Mood normal.         Behavior: Behavior normal.         Assessment/Plan   Problem List Items Addressed This Visit             ICD-10-CM    Depression, major, single episode, mild (CMS-HCC) F32.0     Pt on aripiprazole and " duloxetine  Meds well tolerated  Has been stable         Depression screen Z13.31    Diabetes mellitus (Multi) - Primary E11.9     Pt on metformin and semaglutide  Meds well tolerated  Has been stable  Will check hba1c bmp and u microalbumin  F/up 3 mo         Relevant Orders    Albumin-Creatinine Ratio, Urine Random    Basic Metabolic Panel    Hemoglobin A1C    Follow Up In [facility] - PCP - Established    Primary hypertension I10     Pt on metoprolol  Med well tolerated  Has been stable         Class 2 obesity due to excess calories with body mass index (BMI) of 35.0 to 35.9 in adult E66.812, E66.09, Z68.35     Advise healthy diet and exercise  Pt on semaglutide

## 2025-02-07 NOTE — ASSESSMENT & PLAN NOTE
Pt on metformin and semaglutide  Meds well tolerated  Has been stable  Will check hba1c bmp and u microalbumin  F/up 3 mo

## 2025-02-09 LAB
ANION GAP SERPL CALCULATED.4IONS-SCNC: 10 MMOL/L (CALC) (ref 7–17)
BUN SERPL-MCNC: 15 MG/DL (ref 7–25)
BUN/CREAT SERPL: ABNORMAL (CALC) (ref 6–22)
CALCIUM SERPL-MCNC: 9.2 MG/DL (ref 8.6–10.2)
CHLORIDE SERPL-SCNC: 103 MMOL/L (ref 98–110)
CO2 SERPL-SCNC: 26 MMOL/L (ref 20–32)
CREAT SERPL-MCNC: 0.61 MG/DL (ref 0.5–0.99)
EGFRCR SERPLBLD CKD-EPI 2021: 113 ML/MIN/1.73M2
EST. AVERAGE GLUCOSE BLD GHB EST-MCNC: 163 MG/DL
EST. AVERAGE GLUCOSE BLD GHB EST-SCNC: 9 MMOL/L
GLUCOSE SERPL-MCNC: 131 MG/DL (ref 65–99)
HBA1C MFR BLD: 7.3 % OF TOTAL HGB
POTASSIUM SERPL-SCNC: 4.6 MMOL/L (ref 3.5–5.3)
SODIUM SERPL-SCNC: 139 MMOL/L (ref 135–146)

## 2025-02-12 ENCOUNTER — APPOINTMENT (OUTPATIENT)
Dept: INTEGRATIVE MEDICINE | Facility: CLINIC | Age: 45
End: 2025-02-12
Payer: COMMERCIAL

## 2025-02-14 DIAGNOSIS — E11.9 TYPE 2 DIABETES MELLITUS WITHOUT COMPLICATION, WITHOUT LONG-TERM CURRENT USE OF INSULIN (MULTI): Primary | ICD-10-CM

## 2025-02-14 DIAGNOSIS — E66.812 CLASS 2 OBESITY DUE TO EXCESS CALORIES WITHOUT SERIOUS COMORBIDITY WITH BODY MASS INDEX (BMI) OF 35.0 TO 35.9 IN ADULT: ICD-10-CM

## 2025-02-14 DIAGNOSIS — E66.09 CLASS 2 OBESITY DUE TO EXCESS CALORIES WITHOUT SERIOUS COMORBIDITY WITH BODY MASS INDEX (BMI) OF 35.0 TO 35.9 IN ADULT: ICD-10-CM

## 2025-02-14 PROCEDURE — RXMED WILLOW AMBULATORY MEDICATION CHARGE

## 2025-02-16 DIAGNOSIS — E78.1 HIGH TRIGLYCERIDES: ICD-10-CM

## 2025-02-17 PROCEDURE — RXMED WILLOW AMBULATORY MEDICATION CHARGE

## 2025-02-17 RX ORDER — ATORVASTATIN CALCIUM 40 MG/1
40 TABLET, FILM COATED ORAL DAILY
Qty: 90 TABLET | Refills: 0 | Status: SHIPPED | OUTPATIENT
Start: 2025-02-17 | End: 2026-02-12

## 2025-02-18 ENCOUNTER — PHARMACY VISIT (OUTPATIENT)
Dept: PHARMACY | Facility: CLINIC | Age: 45
End: 2025-02-18
Payer: COMMERCIAL

## 2025-02-26 ENCOUNTER — APPOINTMENT (OUTPATIENT)
Dept: INTEGRATIVE MEDICINE | Facility: CLINIC | Age: 45
End: 2025-02-26
Payer: COMMERCIAL

## 2025-02-26 ENCOUNTER — OFFICE VISIT (OUTPATIENT)
Dept: PRIMARY CARE | Facility: CLINIC | Age: 45
End: 2025-02-26
Payer: COMMERCIAL

## 2025-02-26 VITALS
DIASTOLIC BLOOD PRESSURE: 74 MMHG | BODY MASS INDEX: 34.66 KG/M2 | RESPIRATION RATE: 16 BRPM | WEIGHT: 218 LBS | SYSTOLIC BLOOD PRESSURE: 130 MMHG | HEART RATE: 91 BPM | TEMPERATURE: 98.3 F

## 2025-02-26 DIAGNOSIS — G43.009 MIGRAINE WITHOUT AURA AND WITHOUT STATUS MIGRAINOSUS, NOT INTRACTABLE: Primary | ICD-10-CM

## 2025-02-26 PROCEDURE — 1036F TOBACCO NON-USER: CPT | Performed by: NURSE PRACTITIONER

## 2025-02-26 PROCEDURE — 3078F DIAST BP <80 MM HG: CPT | Performed by: NURSE PRACTITIONER

## 2025-02-26 PROCEDURE — 97811 ACUP 1/> W/O ESTIM EA ADD 15: CPT | Performed by: ACUPUNCTURIST

## 2025-02-26 PROCEDURE — 3075F SYST BP GE 130 - 139MM HG: CPT | Performed by: NURSE PRACTITIONER

## 2025-02-26 PROCEDURE — 96372 THER/PROPH/DIAG INJ SC/IM: CPT | Performed by: NURSE PRACTITIONER

## 2025-02-26 PROCEDURE — 97810 ACUP 1/> WO ESTIM 1ST 15 MIN: CPT | Performed by: ACUPUNCTURIST

## 2025-02-26 PROCEDURE — 99213 OFFICE O/P EST LOW 20 MIN: CPT | Performed by: NURSE PRACTITIONER

## 2025-02-26 RX ORDER — KETOROLAC TROMETHAMINE 30 MG/ML
60 INJECTION, SOLUTION INTRAMUSCULAR; INTRAVENOUS ONCE
Status: COMPLETED | OUTPATIENT
Start: 2025-02-26 | End: 2025-02-26

## 2025-02-26 RX ADMIN — KETOROLAC TROMETHAMINE 60 MG: 30 INJECTION, SOLUTION INTRAMUSCULAR; INTRAVENOUS at 15:21

## 2025-02-26 ASSESSMENT — ENCOUNTER SYMPTOMS
BACK PAIN: 0
SLEEP DISTURBANCE: 1
LOSS OF BALANCE: 0
ACTIVITY CHANGE: 0
NAUSEA: 1
DIZZINESS: 0
NAUSEA: 1
BLURRED VISION: 0
HEADACHES: 1
VOMITING: 1
APPETITE CHANGE: 0
HEADACHES: 1
DIARRHEA: 0
SINUS PRESSURE: 0
SCALP TENDERNESS: 1
NECK PAIN: 1
BACK PAIN: 1
CONSTIPATION: 0
FEVER: 0
VOMITING: 0
COUGH: 0

## 2025-02-26 NOTE — PROGRESS NOTES
"Acupuncture Visit:     Please note: Dictation software was used while completing this note and may contain spelling, grammatical, and/or syntax errors.  Please contact me with any questions.    To clinicians, I am always happy to partner with you in the care of your patients. Do not hesitate to call the office or contact me directly regarding any further consultations or with questions regarding the plan of care outlined or patient progress.    Subjective   Patient ID: Caprice Cowart is a 45 y.o. female who presents for Migraine    Insurance visit 1 of 20    Patient's symptoms are largely baseline today      Initial intake (02/14/2024 )    Pt came in today seeking treatment for chronic neck pain and migraine; she was referred by Isabel Padilla PA-C ([facility] [place])    Patient stated that she has had migraines since her early 20s, and they became worse after her hysterectomy; she has not had any migraines since Christmas, taking both Ubrelvy and Topamax; her migraines are generally bilaterally temporal, lasting hours to days, accompanied by noise sensitivity, nausea, and vomiting; patient also gets tension headaches from neck pain    Patient had a hysterectomy 4/2023 due to stage IV endometriosis, noting that she does not feel like she has \"bounced back\" like she expected; she is also experiencing fatigue, noting that she is recovering from COVID    Migraine   This is a chronic problem. The problem has been gradually improving. The pain is located in the Temporal region. Associated symptoms include back pain, nausea, neck pain, phonophobia and vomiting.   Neck Pain   This is a chronic problem. Associated symptoms include headaches.   Back Pain  Associated symptoms include headaches.                 Review of Systems   Gastrointestinal:  Positive for nausea and vomiting.   Musculoskeletal:  Positive for back pain and neck pain.   Neurological:  Positive for headaches.            Provider reviewed plan for the acupuncture " session, precautions and contraindications. Patient/guardian/hospital staff has given consent to treat with full understanding of what to expect during the session. Before acupuncture began, provider explained to the patient to communicate at any time if the procedure was causing discomfort past their tolerance level. Patient agreed to advise acupuncturist. The acupuncturist counseled the patient on the risks of acupuncture treatment including pain, infection, bleeding, and no relief of pain. The patient was positioned comfortably. There was no evidence of infection at the site of needle insertions.    Objective   Physical Exam                             Assessment/Plan

## 2025-02-26 NOTE — PROGRESS NOTES
Subjective   Patient ID: Caprice Cowart is a 45 y.o. female who presents for Migraine.    Started on Monday  Fatigue  nausea  No vision changes/disturbances  No cold symptoms  Decreased appetite; drinking ok  Sleeping more than usual    Pt drove self to appt and feels safe driving    Follow with neurology    Zvzpret mon/tues  Umbrelvy  Tylenol  Advil-last dose yesterday    Migraine   This is a recurrent problem. Episode onset: 2 days. The problem occurs constantly. The problem has been unchanged. The pain is located in the Temporal (Right Side) region. The pain does not radiate. The pain quality is similar to prior headaches. The quality of the pain is described as stabbing, dull and aching. The pain is at a severity of 10/10. The pain is severe. Associated symptoms include nausea and scalp tenderness. Pertinent negatives include no back pain, blurred vision, coughing, dizziness, ear pain, fever, loss of balance, sinus pressure or vomiting. Nothing aggravates the symptoms.        Review of Systems   Constitutional:  Negative for activity change, appetite change and fever.   HENT:  Positive for congestion. Negative for ear pain and sinus pressure.    Eyes:  Negative for blurred vision.   Respiratory:  Negative for cough.    Gastrointestinal:  Positive for nausea. Negative for constipation, diarrhea and vomiting.   Musculoskeletal:  Negative for back pain.   Neurological:  Positive for headaches. Negative for dizziness and loss of balance.   Psychiatric/Behavioral:  Positive for sleep disturbance.        Objective   /74   Pulse 91   Temp 36.8 °C (98.3 °F) (Tympanic)   Resp 16   Wt 98.9 kg (218 lb)   LMP 03/16/2023   BMI 34.66 kg/m²     Physical Exam  Vitals reviewed.   Constitutional:       General: She is not in acute distress.     Appearance: Normal appearance. She is not ill-appearing or toxic-appearing.   HENT:      Head: Normocephalic.   Eyes:      Extraocular Movements: Extraocular movements  intact.      Conjunctiva/sclera: Conjunctivae normal.      Pupils: Pupils are equal, round, and reactive to light.   Cardiovascular:      Rate and Rhythm: Normal rate and regular rhythm.      Pulses: Normal pulses.      Heart sounds: Normal heart sounds.   Musculoskeletal:      Cervical back: Normal range of motion and neck supple.   Skin:     General: Skin is warm.      Capillary Refill: Capillary refill takes less than 2 seconds.   Neurological:      General: No focal deficit present.      Mental Status: She is alert and oriented to person, place, and time.      Cranial Nerves: Cranial nerves 2-12 are intact.      Motor: Motor function is intact.      Coordination: Coordination is intact.      Gait: Gait is intact.   Psychiatric:         Mood and Affect: Mood normal.         Behavior: Behavior normal.         Assessment/Plan   Diagnoses and all orders for this visit:  Migraine without aura and without status migrainosus, not intractable  -     ketorolac (Toradol) injection 60 mg    IO Toradol given. Pt tolerated well  Encouraged hydration   Continue with meds as needed waiting 8 hours for additional NSAID's  Encouraged to follow up with neurology as planned  ER for any worsening of symptoms

## 2025-02-28 DIAGNOSIS — G43.711 INTRACTABLE CHRONIC MIGRAINE WITHOUT AURA AND WITH STATUS MIGRAINOSUS: Primary | ICD-10-CM

## 2025-02-28 RX ORDER — KETOROLAC TROMETHAMINE 10 MG/1
10 TABLET, FILM COATED ORAL EVERY 6 HOURS
Qty: 20 TABLET | Refills: 0 | Status: SHIPPED | OUTPATIENT
Start: 2025-02-28 | End: 2025-03-05

## 2025-03-10 ENCOUNTER — APPOINTMENT (OUTPATIENT)
Dept: INTEGRATIVE MEDICINE | Facility: CLINIC | Age: 45
End: 2025-03-10
Payer: COMMERCIAL

## 2025-03-21 ENCOUNTER — SPECIALTY PHARMACY (OUTPATIENT)
Dept: PHARMACY | Facility: CLINIC | Age: 45
End: 2025-03-21

## 2025-03-21 ENCOUNTER — APPOINTMENT (OUTPATIENT)
Dept: NEUROLOGY | Facility: CLINIC | Age: 45
End: 2025-03-21
Payer: COMMERCIAL

## 2025-03-21 VITALS — HEART RATE: 71 BPM | TEMPERATURE: 97.7 F | SYSTOLIC BLOOD PRESSURE: 100 MMHG | DIASTOLIC BLOOD PRESSURE: 67 MMHG

## 2025-03-21 DIAGNOSIS — G43.711 INTRACTABLE CHRONIC MIGRAINE WITHOUT AURA AND WITH STATUS MIGRAINOSUS: Primary | ICD-10-CM

## 2025-03-21 DIAGNOSIS — G43.009 MIGRAINE WITHOUT AURA AND WITHOUT STATUS MIGRAINOSUS, NOT INTRACTABLE: ICD-10-CM

## 2025-03-21 PROCEDURE — 3074F SYST BP LT 130 MM HG: CPT

## 2025-03-21 PROCEDURE — 99215 OFFICE O/P EST HI 40 MIN: CPT

## 2025-03-21 PROCEDURE — RXMED WILLOW AMBULATORY MEDICATION CHARGE

## 2025-03-21 PROCEDURE — 3078F DIAST BP <80 MM HG: CPT

## 2025-03-21 RX ORDER — CELECOXIB 120 MG/4.8ML
100 LIQUID ORAL 2 TIMES DAILY
Qty: 28.8 ML | Refills: 2 | Status: SHIPPED | OUTPATIENT
Start: 2025-03-21 | End: 2025-03-30

## 2025-03-21 RX ORDER — METHYLPREDNISOLONE 4 MG/1
TABLET ORAL
Qty: 21 TABLET | Refills: 0 | Status: SHIPPED | OUTPATIENT
Start: 2025-03-21 | End: 2025-03-27

## 2025-03-21 ASSESSMENT — PAIN SCALES - GENERAL: PAINLEVEL_OUTOF10: 3

## 2025-03-21 NOTE — PATIENT INSTRUCTIONS
Dear Caprice,    Thank you for coming to [facility]. It was a pleasure caring for you today.  The best way to contact me for medication refills or questions about your care is through [employer].  Otherwise, you can contact the office by phone: [phone].    Seble Agee, APRN-CNP    I sent in a Medrol Dose Jered for your headache. This is being used to break the headache cycle, take in its entirety until completed. Take with food. Please DO NOT TAKE ANY ADDITIONAL NSAID OR TRIPTAN MEDICATIONS while taking the steroid pack. (No Elyxyb, Excedrin/ibuprofen (Advil)/naproxen, ketorolac, aspirin, etc.)    PLAN:  Add Elyxyb as treatment option, try for 1 month  If no decrease in migraine frequency/ improvement of treatment, we can look into Botox   Keep appointment in May    Migraines:  Suggestions for preventing or controlling migraines:  Riboflavin (vitamin B2) 200 mg twice a day may be helpful. Side effects can include diarrhea, increased urination and bright yellow urine. This should not be taken by kids.   CoQ10 100 mg daily up to three times per day may also be helpful. Side effects can include nausea, diarrhea, and dyspepsia.   Magnesium (oxide) 400- 800 mg daily for prevention. Magnesium can also help with menstrual migraines. Side effects can include diarrhea and flushing.   According to the American Academy of Neurology, there is not sufficient evidence that melatonin helps prevent or treat migraines, so it is not currently recommended for this use. Butterbur is also not currently recommended for migraine prevention as it can cause liver toxicity.   Avoid triggers that can cause or worsen migraines (food, lack of sleep, stress, etc.)  Headache frequency is noted to be increased in those with low physical activity, poor sleep, high BMI, smoking, and caffeine overuse. Managing stress with relaxation techniques and getting 20-30 minutes of aerobic exercise at least 4 times per week can  help decrease headache frequency and intensity.   Keep a diary of your headaches to note triggers, how often you get them, how long they last, associated symptoms, what medicines you took and if they helped, and any other helpful information   Avoid taking rescue medication (NOT preventative medication) more than 3 days a week (includes both prescription and over the counter meds)  Take your preventative medication as directed. Let me know if you have side effects or problems with the medication. Do not suddenly stop the medication.    Logging migraines:  Please keep a log of your migraines to bring to your next visit. It is also helpful to note which medications were helpful (how long it took them to start working, and what % they decreased your migraine by). The easiest way to keep a log is the way that works best for you- paper calendar/calendar in your phone, or within an jose that logs migraines.     *If you are having difficulty breaking the cycle of medication overuse headaches, please message me through [employer] or call the office.

## 2025-03-21 NOTE — PROGRESS NOTES
Chief Complaint   Patient presents with    Migraine     Follow-up/ Increase in migraines.     Subjective   HPI  Caprice Cowart is a 45 y.o. year old female who presents with chief complaint Intractable chronic migraine without aura and with status migrainosus [G43.711] PMH significant for type 2 diabetes, GERD, eczema, and allergic rhinitis. She states she had mild migraines in her 20s, but more intense now, notably after her hysterectomy (4/2023). Migraines were under better control, but she has noticed an overall increase in the last month, perhaps, due to frequent weather fluctuations. Has not correlated with wear-off of Ajovy.     Caprice is experiencing  15 HA days per month, 9 of the HAs meet migraine criteria. (Previous, 6 HA days, 1 meeting migraine criteria.)   Triggers include barometric pressure  and stress.   The headaches are usually sharp and tingling pain  and are located right parietal/ occipital, generally unilateral. On the right side. Radiates down into the right side of the neck, sometimes to the right temple. She recalls one occasion of left side pain/ HA. Pain intensity 8/10. HAs last at least 4 hours.   Associated nausea, phonophobia, vestibular symptoms, and fatigue/weakness .   Headaches are worsened with exertion.   The headaches    positional. No change in character with sneezing, coughing and bending over.   Mild right side pressure HAs- using Tylenol up to 2 to 3. If more sharp pressure/sudden onset, will start with CGRP.  The current rescue medication Zavzpret works within 1.5-2  hours and decreased the headache partially. If ineffective, uses Ubrelvy.   The patient denies the presence of any associated double vision, speech problems, confusion, facial or extremity weakness or numbness or problems with coordination. Denies other neurological history of seizures, stroke, or TIA.    Current/ past HA treatments:  Preventive:  Aimovig- not helpful  Ajovy 9/30/24- has been helpful  Has  not tried Emgality     Rescue:  Zavzpret- helpful  Ubrelvy somewhat helpful.  Toradol protocol- helpful  Excedrin  Tylenol  advil  Nurtec- not helpful  Treximet- had angina/ numbness/tingling left arm  Midrin- worked, would not last  Relpax- not helpful  Steroid pack 10/15/24 helpful    Current Outpatient Medications:     ARIPiprazole (Abilify) 2 mg tablet, Take 1 tablet (2 mg) by mouth once daily., Disp: 90 tablet, Rfl: 3    atorvastatin (Lipitor) 40 mg tablet, Take 1 tablet (40 mg) by mouth once daily., Disp: 90 tablet, Rfl: 0    blood sugar diagnostic (Accu-Chek Guide test strips) strip, Use 1 strip to test blood sugar 2 times daily., Disp: 100 strip, Rfl: 3    budesonide (Rhinocort AQ) 32 mcg/actuation nasal spray, Administer 1 spray into each nostril once daily., Disp: 8.43 mL, Rfl: 3    cholecalciferol (Vitamin D3) 5,000 Units tablet, Take 1 tablet (5,000 Units) by mouth once daily., Disp: , Rfl:     dehydroepiandrosterone (DHEA) 25 mg tablet, Take 1 tablet (25 mg) by mouth once daily., Disp: , Rfl:     DULoxetine (Cymbalta) 60 mg DR capsule, Take 1 capsule (60 mg) by mouth once daily., Disp: 90 capsule, Rfl: 3    Elyxyb 120 mg/4.8 mL (25 mg/mL) solution oral solution, Take 4 mL (100 mg) by mouth 2 times a day for 18 doses., Disp: 28.8 mL, Rfl: 2    fexofenadine (Allegra) 180 mg tablet, Take 1 tablet (180 mg) by mouth once daily., Disp: 90 tablet, Rfl: 3    fremanezumab (Ajovy) 225 mg/1.5 mL auto-injector, Inject 1 Pen (225 mg) under the skin every 28 (twenty-eight) days., Disp: 4.5 mL, Rfl: 3    MAGNESIUM GLYCINATE ORAL, Take by mouth., Disp: , Rfl:     medical supply, miscellaneous (MISCELLANEOUS CREAMS TOP), Apply topically. W-biest 50/50 cream. Estrgen, Disp: , Rfl:     metFORMIN (Glucophage) 500 mg tablet, Take 2 tablets (1,000 mg) by mouth 2 times daily (morning and late afternoon)., Disp: 360 tablet, Rfl: 1    methylPREDNISolone (Medrol Dospak) 4 mg tablets, Follow schedule on package instructions,  Disp: 21 tablet, Rfl: 0    metoprolol succinate XL (Toprol-XL) 25 mg 24 hr tablet, Take 1 tablet (25 mg) by mouth once daily. Do not crush or chew., Disp: 90 tablet, Rfl: 3    montelukast (Singulair) 10 mg tablet, Take 1 tablet (10 mg) by mouth once daily., Disp: 90 tablet, Rfl: 3    multivitamin tablet, Take by mouth., Disp: , Rfl:     omeprazole (PriLOSEC) 20 mg DR capsule, Take 1 capsule (20 mg) by mouth once daily., Disp: 90 capsule, Rfl: 3    semaglutide 2 mg/dose (8 mg/3 mL) pen injector, Inject 2 mg under the skin 1 (one) time per week., Disp: 9 mL, Rfl: 2    zavegepant (Zavzpret) 10 mg/actuation spray,non-aerosol, Administer 10 mg into affected nostril(s) once daily as needed (migraine, no more than 1 device per 24 hours)., Disp: 6 each, Rfl: 11    Allergies   Allergen Reactions    Penicillins Hives    Promethazine Other     Vomiting    Sumatriptan Other     Angina/chest pressure    Sumatriptan-Naproxen Other     TEXIMET Angina, numbness with tingling    Neomycin-Bacitracin-Polymyxin Rash     Social History     Tobacco Use    Smoking status: Former     Current packs/day: 1.00     Types: Cigarettes     Passive exposure: Past    Smokeless tobacco: Never   Substance Use Topics    Alcohol use: Yes     Comment: Rare     Social History     Substance and Sexual Activity   Drug Use Never      ROS  As noted in HPI, otherwise all other systems have been reviewed are negative for complaint.     Objective   General Appearance: Caprice is well-developed, well-nourished, 45 y.o. year old female, in no acute distress. Makes good eye contact, is alert, interactive, and cooperative. Demonstrates recent & remote memory recall. Subjective information consistent with objective assessment.     Vitals:    03/21/25 0902   BP: 100/67   Pulse: 71  Comment: 96% SPO2   Temp: 36.5 °C (97.7 °F)   PainSc:   3   PainLoc: Head   LMP: 03/16/2023     Lab Results   Component Value Date    WBC 11.7 (H) 04/04/2024    RBC 4.59 04/04/2024    HGB  12.8 04/04/2024    HCT 40.1 04/04/2024     04/04/2024     02/08/2025    K 4.6 02/08/2025     02/08/2025    BUN 15 02/08/2025    CREATININE 0.61 02/08/2025    EGFR 113 02/08/2025    CALCIUM 9.2 02/08/2025    ALKPHOS 131 (H) 07/14/2023    AST 14 07/14/2023    ALT 21 07/14/2023    WHIUONUY05 605 04/04/2024    VITD25 62 04/04/2024    HGBA1C 7.3 (H) 02/08/2025    LDLCALC 51 08/09/2024    CHOL 145 08/09/2024    HDL 61.3 08/09/2024    TRIG 162 (H) 08/09/2024    TSH 1.80 05/15/2024      Neurological Exam  Cranial Nerves  CN III, IV, VI: Extraocular movements intact bilaterally. Normal lids and orbits bilaterally.  CN VII: Full and symmetric facial movement.  CN VIII: Hearing is normal.  Assessment & Plan  Intractable chronic migraine without aura and with status migrainosus    Orders:    Elyxyb 120 mg/4.8 mL (25 mg/mL) solution oral solution; Take 4 mL (100 mg) by mouth 2 times a day for 18 doses.    methylPREDNISolone (Medrol Dospak) 4 mg tablets; Follow schedule on package instructions    Migraine without aura and without status migrainosus, not intractable    Orders:    Elyxyb 120 mg/4.8 mL (25 mg/mL) solution oral solution; Take 4 mL (100 mg) by mouth 2 times a day for 18 doses.    Follow Up In Neurology; Future     ASSESSMENT/PLAN:  Add Elyxyb as treatment option, try for 1 month (sample provided today)  If no decrease in migraine frequency/ improvement of treatment, we can look into Botox   Keep appointment in May    I personally spent 39 minutes today, exclusive of procedures, providing care for this patient, including preparation, face to face time, documentation and other services such as review of medical records, diagnostic result, patient education, counseling, coordination of care as specified in the encounter.     Seble Agee, APRN-CNP

## 2025-03-21 NOTE — ASSESSMENT & PLAN NOTE
Orders:    Elyxyb 120 mg/4.8 mL (25 mg/mL) solution oral solution; Take 4 mL (100 mg) by mouth 2 times a day for 18 doses.    Follow Up In Neurology; Future

## 2025-03-22 PROCEDURE — RXMED WILLOW AMBULATORY MEDICATION CHARGE

## 2025-03-24 ENCOUNTER — PHARMACY VISIT (OUTPATIENT)
Dept: PHARMACY | Facility: CLINIC | Age: 45
End: 2025-03-24
Payer: COMMERCIAL

## 2025-03-24 PROCEDURE — RXMED WILLOW AMBULATORY MEDICATION CHARGE

## 2025-03-27 PROCEDURE — RXMED WILLOW AMBULATORY MEDICATION CHARGE

## 2025-03-31 ENCOUNTER — APPOINTMENT (OUTPATIENT)
Dept: INTEGRATIVE MEDICINE | Facility: CLINIC | Age: 45
End: 2025-03-31
Payer: COMMERCIAL

## 2025-03-31 ENCOUNTER — PHARMACY VISIT (OUTPATIENT)
Dept: PHARMACY | Facility: CLINIC | Age: 45
End: 2025-03-31
Payer: COMMERCIAL

## 2025-03-31 DIAGNOSIS — G43.009 MIGRAINE WITHOUT AURA AND WITHOUT STATUS MIGRAINOSUS, NOT INTRACTABLE: Primary | ICD-10-CM

## 2025-03-31 DIAGNOSIS — M54.2 CHRONIC NECK PAIN: ICD-10-CM

## 2025-03-31 DIAGNOSIS — G89.29 CHRONIC NECK PAIN: ICD-10-CM

## 2025-03-31 PROCEDURE — 97811 ACUP 1/> W/O ESTIM EA ADD 15: CPT | Performed by: ACUPUNCTURIST

## 2025-03-31 PROCEDURE — 97810 ACUP 1/> WO ESTIM 1ST 15 MIN: CPT | Performed by: ACUPUNCTURIST

## 2025-03-31 ASSESSMENT — ENCOUNTER SYMPTOMS
HEADACHES: 1
NECK PAIN: 1
VOMITING: 1
NAUSEA: 1
BACK PAIN: 1

## 2025-03-31 NOTE — PROGRESS NOTES
"Acupuncture Visit:     Please note: Dictation software was used while completing this note and may contain spelling, grammatical, and/or syntax errors.  Please contact me with any questions.    To clinicians, I am always happy to partner with you in the care of your patients. Do not hesitate to call the office or contact me directly regarding any further consultations or with questions regarding the plan of care outlined or patient progress.    Subjective   Patient ID: Caprice Cowart is a 45 y.o. female who presents for Migraine    Insurance visit 3 of 20    Patient reports a long run of migraines over the past few months, unfortunately resulting in the termination of her work position due to her FMLA running out; she saw neurology 03/21 and was prescribed a new rescue medication, but has not had opportunity to use it as of yet    She also reports continued tension in her upper back, neck, and shoulders      Initial intake (02/14/2024 )    Pt came in today seeking treatment for chronic neck pain and migraine; she was referred by Isabel Padilla PA-C ([facility] [place])    Patient stated that she has had migraines since her early 20s, and they became worse after her hysterectomy; she has not had any migraines since Christmas, taking both Ubrelvy and Topamax; her migraines are generally bilaterally temporal, lasting hours to days, accompanied by noise sensitivity, nausea, and vomiting; patient also gets tension headaches from neck pain    Patient had a hysterectomy 4/2023 due to stage IV endometriosis, noting that she does not feel like she has \"bounced back\" like she expected; she is also experiencing fatigue, noting that she is recovering from COVID    Migraine   This is a chronic problem. The problem has been gradually improving. The pain is located in the Temporal region. Associated symptoms include back pain, nausea, neck pain, phonophobia and vomiting.   Neck Pain   This is a chronic problem. Associated symptoms include " headaches.   Back Pain  Associated symptoms include headaches.       Session Information  Is this acupuncture treatment being billed to the patient's insurance company: Yes  This is visit number: 3  The patient has a total number of visits of: 20  Initial Acupuncture Treatment date: 01/22/25  Last Treatment date: 02/26/25  Name of Insurance Company:  Employee Medical Plan  Visit Type: Follow-up visit  Medical History Reviewed: I have reviewed pertinent medical history in EHR, and no contraindications are present to provide treatment  Description of present complaint: Muscle tension, Myofascial pain, Headache, Chronic pain, Stress         Review of Systems   Gastrointestinal:  Positive for nausea and vomiting.   Musculoskeletal:  Positive for back pain and neck pain.   Neurological:  Positive for headaches.            Provider reviewed plan for the acupuncture session, precautions and contraindications. Patient/guardian/hospital staff has given consent to treat with full understanding of what to expect during the session. Before acupuncture began, provider explained to the patient to communicate at any time if the procedure was causing discomfort past their tolerance level. Patient agreed to advise acupuncturist. The acupuncturist counseled the patient on the risks of acupuncture treatment including pain, infection, bleeding, and no relief of pain. The patient was positioned comfortably. There was no evidence of infection at the site of needle insertions.    Objective   Physical Exam         Treatment Plan  Treatment Goals: Pain management, Wellbeing improvement, Stress reduction    Acupuncture Treatment  Patient Position: Prone on a table  Needle Guage: 36 guage /.20/ Blue seirin, 32 guage /.25/ Purple seirin  Body Points - Bilateral: KD3, GB34, BL23, BL52, BL14, BL43, suboccipitals, upper traps  Body Points - Right: GB41  Other Techniques Utilized: TDP Lamp, Topicals, Cupping  Cupping Description: Flash/sliding,  paraspinals and upper back  Topicals Description: Sports Massage oil  TDP Lamp Descripton: Mid-back, 20min  Needle Count In: 17  Needle Count Out: 17  Needle Retention Time (min): 20 minutes  Total Face to Face Time (min): 25 minutes              Assessment/Plan

## 2025-05-05 PROCEDURE — RXMED WILLOW AMBULATORY MEDICATION CHARGE

## 2025-05-07 PROCEDURE — RXMED WILLOW AMBULATORY MEDICATION CHARGE

## 2025-05-09 ENCOUNTER — APPOINTMENT (OUTPATIENT)
Dept: PRIMARY CARE | Facility: CLINIC | Age: 45
End: 2025-05-09

## 2025-05-09 ENCOUNTER — APPOINTMENT (OUTPATIENT)
Dept: NEUROLOGY | Facility: CLINIC | Age: 45
End: 2025-05-09
Payer: COMMERCIAL

## 2025-05-12 ENCOUNTER — PHARMACY VISIT (OUTPATIENT)
Dept: PHARMACY | Facility: CLINIC | Age: 45
End: 2025-05-12
Payer: COMMERCIAL

## 2025-05-14 DIAGNOSIS — E78.1 HIGH TRIGLYCERIDES: ICD-10-CM

## 2025-05-14 DIAGNOSIS — E11.9 NEW ONSET TYPE 2 DIABETES MELLITUS (MULTI): ICD-10-CM

## 2025-05-14 PROCEDURE — RXMED WILLOW AMBULATORY MEDICATION CHARGE

## 2025-05-14 RX ORDER — ATORVASTATIN CALCIUM 40 MG/1
40 TABLET, FILM COATED ORAL DAILY
Qty: 90 TABLET | Refills: 0 | Status: SHIPPED | OUTPATIENT
Start: 2025-05-14 | End: 2026-05-09

## 2025-05-15 PROCEDURE — RXMED WILLOW AMBULATORY MEDICATION CHARGE

## 2025-05-15 RX ORDER — METFORMIN HYDROCHLORIDE 500 MG/1
1000 TABLET ORAL
Qty: 360 TABLET | Refills: 1 | Status: SHIPPED | OUTPATIENT
Start: 2025-05-15

## 2025-05-21 ENCOUNTER — PHARMACY VISIT (OUTPATIENT)
Dept: PHARMACY | Facility: CLINIC | Age: 45
End: 2025-05-21
Payer: COMMERCIAL

## 2025-06-23 PROCEDURE — RXMED WILLOW AMBULATORY MEDICATION CHARGE

## 2025-06-27 ENCOUNTER — PHARMACY VISIT (OUTPATIENT)
Dept: PHARMACY | Facility: CLINIC | Age: 45
End: 2025-06-27
Payer: COMMERCIAL

## 2025-07-09 ENCOUNTER — APPOINTMENT (OUTPATIENT)
Dept: NEUROLOGY | Facility: CLINIC | Age: 45
End: 2025-07-09

## 2025-07-17 PROCEDURE — RXMED WILLOW AMBULATORY MEDICATION CHARGE

## 2025-07-19 ENCOUNTER — PHARMACY VISIT (OUTPATIENT)
Dept: PHARMACY | Facility: CLINIC | Age: 45
End: 2025-07-19
Payer: COMMERCIAL

## 2025-08-04 PROCEDURE — RXMED WILLOW AMBULATORY MEDICATION CHARGE

## 2025-08-15 ENCOUNTER — PHARMACY VISIT (OUTPATIENT)
Dept: PHARMACY | Facility: CLINIC | Age: 45
End: 2025-08-15
Payer: COMMERCIAL

## 2025-08-18 DIAGNOSIS — Z11.1 SCREENING-PULMONARY TB: ICD-10-CM

## 2025-08-29 LAB
IGNF NEG CNTRL BLD: NORMAL
M TB IFN-G BLD-IMP: NEGATIVE
MITOGEN IGNF.SPOT COUNT BLD: NORMAL
QUEST PANEL A SPOT COUNT: 3
QUEST PANEL B SPOT COUNT: 2